# Patient Record
Sex: MALE | Race: WHITE | NOT HISPANIC OR LATINO | Employment: OTHER | URBAN - NONMETROPOLITAN AREA
[De-identification: names, ages, dates, MRNs, and addresses within clinical notes are randomized per-mention and may not be internally consistent; named-entity substitution may affect disease eponyms.]

---

## 2017-03-29 ENCOUNTER — AMBULATORY - GICH (OUTPATIENT)
Dept: FAMILY MEDICINE | Facility: OTHER | Age: 72
End: 2017-03-29

## 2017-03-29 ENCOUNTER — TRANSFERRED RECORDS (OUTPATIENT)
Dept: HEALTH INFORMATION MANAGEMENT | Facility: CLINIC | Age: 72
End: 2017-03-29

## 2017-03-29 ENCOUNTER — OFFICE VISIT - GICH (OUTPATIENT)
Dept: FAMILY MEDICINE | Facility: OTHER | Age: 72
End: 2017-03-29

## 2017-03-29 ENCOUNTER — HOSPITAL ENCOUNTER (OUTPATIENT)
Dept: RADIOLOGY | Facility: OTHER | Age: 72
End: 2017-03-29
Attending: FAMILY MEDICINE

## 2017-03-29 ENCOUNTER — HISTORY (OUTPATIENT)
Dept: FAMILY MEDICINE | Facility: OTHER | Age: 72
End: 2017-03-29

## 2017-03-29 DIAGNOSIS — R42 DIZZINESS AND GIDDINESS: ICD-10-CM

## 2017-03-29 DIAGNOSIS — D75.1 SECONDARY POLYCYTHEMIA: ICD-10-CM

## 2017-03-29 DIAGNOSIS — R26.89 OTHER ABNORMALITIES OF GAIT AND MOBILITY: ICD-10-CM

## 2017-03-29 LAB
A/G RATIO - HISTORICAL: 1.4 (ref 1–2)
ABSOLUTE BASOPHILS - HISTORICAL: 0.1 THOU/CU MM
ABSOLUTE EOSINOPHILS - HISTORICAL: 0.2 THOU/CU MM
ABSOLUTE LYMPHOCYTES - HISTORICAL: 1.4 THOU/CU MM (ref 0.9–2.9)
ABSOLUTE MONOCYTES - HISTORICAL: 0.5 THOU/CU MM
ABSOLUTE NEUTROPHILS - HISTORICAL: 3.2 THOU/CU MM (ref 1.7–7)
ALBUMIN SERPL-MCNC: 4.3 G/DL (ref 3.5–5.7)
ALP SERPL-CCNC: 89 IU/L (ref 34–104)
ALT (SGPT) - HISTORICAL: 81 IU/L (ref 7–52)
ANION GAP - HISTORICAL: 10 (ref 5–18)
AST SERPL-CCNC: 38 IU/L (ref 13–39)
BASOPHILS # BLD AUTO: 2.4 %
BILIRUB SERPL-MCNC: 0.9 MG/DL (ref 0.3–1)
BUN SERPL-MCNC: 20 MG/DL (ref 7–25)
BUN/CREAT RATIO - HISTORICAL: 27
CALCIUM SERPL-MCNC: 10 MG/DL (ref 8.6–10.3)
CHLORIDE SERPLBLD-SCNC: 103 MMOL/L (ref 98–107)
CO2 SERPL-SCNC: 26 MMOL/L (ref 21–31)
CREAT SERPL-MCNC: 0.75 MG/DL (ref 0.7–1.3)
EOSINOPHIL NFR BLD AUTO: 4.4 %
ERYTHROCYTE [DISTWIDTH] IN BLOOD BY AUTOMATED COUNT: 12.6 % (ref 11.5–15.5)
GFR IF NOT AFRICAN AMERICAN - HISTORICAL: >60 ML/MIN/1.73M2
GLOBULIN - HISTORICAL: 3 G/DL (ref 2–3.7)
GLUCOSE SERPL-MCNC: 105 MG/DL (ref 70–105)
HCT VFR BLD AUTO: 51 % (ref 37–53)
HEMOGLOBIN: 17.7 G/DL (ref 13.5–17.5)
LYMPHOCYTES NFR BLD AUTO: 25.5 % (ref 20–44)
MCH RBC QN AUTO: 32 PG (ref 26–34)
MCHC RBC AUTO-ENTMCNC: 34.6 G/DL (ref 32–36)
MCV RBC AUTO: 92 FL (ref 80–100)
MONOCYTES NFR BLD AUTO: 8.8 %
NEUTROPHILS NFR BLD AUTO: 58.9 % (ref 42–72)
PLATELET # BLD AUTO: 156 THOU/CU MM (ref 140–440)
PMV BLD: 7.4 FL (ref 6.5–11)
POTASSIUM SERPL-SCNC: 3.8 MMOL/L (ref 3.5–5.1)
PROT SERPL-MCNC: 7.3 G/DL (ref 6.4–8.9)
RED BLOOD COUNT - HISTORICAL: 5.51 MIL/CU MM (ref 4.3–5.9)
SODIUM SERPL-SCNC: 139 MMOL/L (ref 133–143)
TROPONIN I - HISTORICAL: <0.03 NG/ML
WHITE BLOOD COUNT - HISTORICAL: 5.5 THOU/CU MM (ref 4.5–11)

## 2017-03-30 ENCOUNTER — HOSPITAL ENCOUNTER (OUTPATIENT)
Dept: PHYSICAL THERAPY | Facility: OTHER | Age: 72
Setting detail: THERAPIES SERIES
End: 2017-03-30
Attending: FAMILY MEDICINE

## 2017-03-30 DIAGNOSIS — R26.89 OTHER ABNORMALITIES OF GAIT AND MOBILITY: ICD-10-CM

## 2017-03-30 DIAGNOSIS — R42 DIZZINESS AND GIDDINESS: ICD-10-CM

## 2017-04-05 ENCOUNTER — HOSPITAL ENCOUNTER (OUTPATIENT)
Dept: PHYSICAL THERAPY | Facility: OTHER | Age: 72
Setting detail: THERAPIES SERIES
End: 2017-04-05
Attending: FAMILY MEDICINE

## 2017-04-11 ENCOUNTER — AMBULATORY - GICH (OUTPATIENT)
Dept: FAMILY MEDICINE | Facility: OTHER | Age: 72
End: 2017-04-11

## 2017-07-05 ENCOUNTER — HISTORY (OUTPATIENT)
Dept: FAMILY MEDICINE | Facility: OTHER | Age: 72
End: 2017-07-05

## 2017-07-05 ENCOUNTER — OFFICE VISIT - GICH (OUTPATIENT)
Dept: FAMILY MEDICINE | Facility: OTHER | Age: 72
End: 2017-07-05

## 2017-07-05 DIAGNOSIS — M54.2 CERVICALGIA: ICD-10-CM

## 2017-07-05 DIAGNOSIS — N40.3 NODULAR PROSTATE WITH LOWER URINARY TRACT SYMPTOMS: ICD-10-CM

## 2017-07-05 DIAGNOSIS — N13.8 OTHER OBSTRUCTIVE AND REFLUX UROPATHY: ICD-10-CM

## 2017-07-06 LAB — LYME SCREEN W/REFLEX WEST BLOT - HISTORICAL: NEGATIVE

## 2017-08-03 ENCOUNTER — AMBULATORY - GICH (OUTPATIENT)
Dept: SCHEDULING | Facility: OTHER | Age: 72
End: 2017-08-03

## 2017-10-25 ENCOUNTER — AMBULATORY - GICH (OUTPATIENT)
Dept: FAMILY MEDICINE | Facility: OTHER | Age: 72
End: 2017-10-25

## 2017-10-25 DIAGNOSIS — Z23 ENCOUNTER FOR IMMUNIZATION: ICD-10-CM

## 2017-11-28 ENCOUNTER — HISTORY (OUTPATIENT)
Dept: FAMILY MEDICINE | Facility: OTHER | Age: 72
End: 2017-11-28

## 2017-11-28 ENCOUNTER — OFFICE VISIT - GICH (OUTPATIENT)
Dept: FAMILY MEDICINE | Facility: OTHER | Age: 72
End: 2017-11-28

## 2017-11-28 DIAGNOSIS — C61 MALIGNANT NEOPLASM OF PROSTATE (H): ICD-10-CM

## 2017-11-28 DIAGNOSIS — G62.9 POLYNEUROPATHY: ICD-10-CM

## 2017-11-28 DIAGNOSIS — N40.3 NODULAR PROSTATE WITH LOWER URINARY TRACT SYMPTOMS: ICD-10-CM

## 2017-11-28 DIAGNOSIS — N13.8 OTHER OBSTRUCTIVE AND REFLUX UROPATHY: ICD-10-CM

## 2017-11-28 DIAGNOSIS — E78.5 HYPERLIPIDEMIA: ICD-10-CM

## 2017-11-28 DIAGNOSIS — R63.4 ABNORMAL WEIGHT LOSS: ICD-10-CM

## 2017-11-28 DIAGNOSIS — D75.1 SECONDARY POLYCYTHEMIA: ICD-10-CM

## 2017-11-28 LAB
A/G RATIO - HISTORICAL: 1.4 (ref 1–2)
ABSOLUTE BASOPHILS - HISTORICAL: 0.1 THOU/CU MM
ABSOLUTE EOSINOPHILS - HISTORICAL: 0.2 THOU/CU MM
ABSOLUTE IMMATURE GRANULOCYTES(METAS,MYELOS,PROS) - HISTORICAL: 0 THOU/CU MM
ABSOLUTE LYMPHOCYTES - HISTORICAL: 1.5 THOU/CU MM (ref 0.9–2.9)
ABSOLUTE MONOCYTES - HISTORICAL: 0.4 THOU/CU MM
ABSOLUTE NEUTROPHILS - HISTORICAL: 3.3 THOU/CU MM (ref 1.7–7)
ALBUMIN SERPL-MCNC: 4.7 G/DL (ref 3.5–5.7)
ALP SERPL-CCNC: 82 IU/L (ref 34–104)
ALT (SGPT) - HISTORICAL: 35 IU/L (ref 7–52)
ANION GAP - HISTORICAL: 11 (ref 5–18)
AST SERPL-CCNC: 20 IU/L (ref 13–39)
BASOPHILS # BLD AUTO: 1.1 %
BILIRUB SERPL-MCNC: 1.3 MG/DL (ref 0.3–1)
BUN SERPL-MCNC: 26 MG/DL (ref 7–25)
BUN/CREAT RATIO - HISTORICAL: 32
CALCIUM SERPL-MCNC: 10.5 MG/DL (ref 8.6–10.3)
CHLORIDE SERPLBLD-SCNC: 104 MMOL/L (ref 98–107)
CHOL/HDL RATIO - HISTORICAL: 5.66
CHOLESTEROL TOTAL: 215 MG/DL
CO2 SERPL-SCNC: 23 MMOL/L (ref 21–31)
CREAT SERPL-MCNC: 0.82 MG/DL (ref 0.7–1.3)
EOSINOPHIL NFR BLD AUTO: 4.2 %
ERYTHROCYTE [DISTWIDTH] IN BLOOD BY AUTOMATED COUNT: 13 % (ref 11.5–15.5)
GFR IF NOT AFRICAN AMERICAN - HISTORICAL: >60 ML/MIN/1.73M2
GLOBULIN - HISTORICAL: 3.3 G/DL (ref 2–3.7)
GLUCOSE SERPL-MCNC: 104 MG/DL (ref 70–105)
HCT VFR BLD AUTO: 51.7 % (ref 37–53)
HDLC SERPL-MCNC: 38 MG/DL (ref 23–92)
HEMOGLOBIN: 17.8 G/DL (ref 13.5–17.5)
IMMATURE GRANULOCYTES(METAS,MYELOS,PROS) - HISTORICAL: 0.2 %
LDLC SERPL CALC-MCNC: 143 MG/DL
LYMPHOCYTES NFR BLD AUTO: 26.9 % (ref 20–44)
MCH RBC QN AUTO: 31.6 PG (ref 26–34)
MCHC RBC AUTO-ENTMCNC: 34.4 G/DL (ref 32–36)
MCV RBC AUTO: 92 FL (ref 80–100)
MONOCYTES NFR BLD AUTO: 7.9 %
NEUTROPHILS NFR BLD AUTO: 59.7 % (ref 42–72)
NON-HDL CHOLESTEROL - HISTORICAL: 177 MG/DL
PLATELET # BLD AUTO: 164 THOU/CU MM (ref 140–440)
PMV BLD: 9.8 FL (ref 6.5–11)
POTASSIUM SERPL-SCNC: 4.2 MMOL/L (ref 3.5–5.1)
PROT SERPL-MCNC: 8 G/DL (ref 6.4–8.9)
PROVIDER ORDERDED STATUS - HISTORICAL: ABNORMAL
RED BLOOD COUNT - HISTORICAL: 5.63 MIL/CU MM (ref 4.3–5.9)
SODIUM SERPL-SCNC: 138 MMOL/L (ref 133–143)
TRIGL SERPL-MCNC: 170 MG/DL
TSH - HISTORICAL: 3.03 UIU/ML (ref 0.34–5.6)
VIT B12 SERPL-MCNC: 257 PG/ML (ref 180–914)
WHITE BLOOD COUNT - HISTORICAL: 5.5 THOU/CU MM (ref 4.5–11)

## 2017-11-28 ASSESSMENT — PATIENT HEALTH QUESTIONNAIRE - PHQ9: SUM OF ALL RESPONSES TO PHQ QUESTIONS 1-9: 0

## 2017-12-27 NOTE — PROGRESS NOTES
Patient Information     Patient Name MRN Sex Heriberto Moctezuma [id number] Male 1945      Progress Notes by Kelvin Fontanez MD at 2017 10:15 AM     Author:  Kelvin Fontanez MD Service:  (none) Author Type:  Physician     Filed:  2017 10:53 AM Encounter Date:  2017 Status:  Signed     :  Kelvin Fontanez MD (Physician)            SUBJECTIVE:    Heriberto Noriega is a 71 y.o. male who presents for neck pain    HPI Comments: Patient arrives here for neck pain. States it started about one month ago when he had a cold headache body aches. The chest cold is gone. The body aches have improved he still has a little neck pain and headache. He reports his neck is very stiff. He states that his wife had similar symptoms with neck and was diagnosed with Lyme disease. He states that she was diagnosed but she never did have a positive test. He reports he also has a brain fog. Occasionally cold. He is very concerned that he might have chronic Lyme disease like his wife.      Allergies     Allergen  Reactions     Aspirin Hives   ,   Family History       Problem   Relation Age of Onset     Heart Disease  Father       from CHF       Cancer-breast  Mother       from Breast Cancer       Heart Disease  Brother      cardiac surgery, CABG       Other  Brother       polio       Cancer  Brother       from lung cancer       Genitourinary Disease  Brother       liver disease, Agent Orange being a  in the [place];     , No birth history on file.,   Social History      Substance Use Topics        Smoking status:  Former Smoker     Types: Cigarettes     Quit date: 1979     Smokeless tobacco:  Never Used     Alcohol use  No    and   Social History      Substance Use Topics        Smoking status:  Former Smoker     Types: Cigarettes     Quit date: 1979     Smokeless tobacco:  Never Used     Alcohol use  No       REVIEW OF SYSTEMS:  ROS    OBJECTIVE:  /68  Pulse 88  Wt 92.1 kg (203  lb)  BMI 31.79 kg/m2    EXAM:   Physical Exam   Constitutional: He is well-developed, well-nourished, and in no distress.   Eyes: Pupils are equal, round, and reactive to light.   Neck:   Patient has slightly decreased range of motion.  No palpable lymph nodes.       ASSESSMENT/PLAN:    ICD-10-CM    1. Neck pain M54.2 LYME SCREEN W/REFLEX      LYME SCREEN W/REFLEX   2. Nodular prostate with urinary obstruction N40.3 alfuzosin (UROXATRAL) 10 mg Sustained-Release tablet     N13.8         Plan:  Discussed extensively false positives false negatives for the Lyme titer. I discussed that a be very unusual that chronic Lyme disease would present which is neck pain. He does not have any synovitis present. Lyme titers done at his request. We'll get back to him when labs return.

## 2017-12-28 NOTE — PROGRESS NOTES
Patient Information     Patient Name MRN Heriberto Musa [id number] Male 1945      Progress Notes by Maurice Agustin MD at 2017  8:30 AM     Author:  Maurice Agustin MD Service:  (none) Author Type:  Physician     Filed:  2017  5:16 PM Encounter Date:  2017 Status:  Signed     :  Maurice Agustin MD (Physician)            Nursing Notes:   Leanne Hargrove  2017  9:18 AM  Signed  He is here today for medication management.  Leanne BARRERA Delvin LPN..................2017   9:13 AM       SUBJECTIVE:  Heriberto Noriega  is a 72 y.o. male who comes in today for complete evaluation. He saw the urologist in August and had an undetectable PSA and is doing well. He continues on alfuzosin for incontinence and it works well for him. He needs refill for that.    It was noted that he had polycythemia on the last couple CBCs. He's not had any workup for this. He is up-to-date on health maintenance issues.    He has lost about 30#. He has changed his diet and has been walking more.     He has noticed a change in strength and balance over the years. He has had some right leg pain off and on. He at times will have the feeling that his sock is bunched up on his right foot consistent with mild neuropathy.     He is headed to [place] for the winter after [place]. He has been active.    Past Medical, Family, and Social History reviewed and updated as noted below.   ROS is negative except as noted above       Allergies     Allergen  Reactions     Aspirin Hives   ,   Family History       Problem   Relation Age of Onset     Heart Disease  Father       from CHF       Cancer-breast  Mother       from Breast Cancer       Heart Disease  Brother      cardiac surgery, CABG       Other  Brother       polio       Cancer  Brother       from lung cancer       Genitourinary Disease  Brother       liver disease, Agent Orange being a  in the [place] War;     ,   Current Outpatient  Prescriptions on File Prior to Visit       Medication  Sig Dispense Refill     alfuzosin (UROXATRAL) 10 mg Sustained-Release tablet Take 1 tablet by mouth once daily with a meal. 90 tablet 3     No current facility-administered medications on file prior to visit.    ,   Past Medical History:     Diagnosis  Date     Abdominal ultrasound, abnormal 03/27/2001    cholelithiasis.        ADENOCARCINOMA, PROSTATE 11/21/2011    He completed radiation treatment for prostate cancer on August 17 of 2012.  Initially had Radioactive palladium seed implants done followed by external beam radiation      Back pain 12/13/2005     with radicular symptoms       Dietary counseling     Low carbohydrate diet.       Difficulty maintaining weight     Weight management      Elevated liver function tests     mild hyperlipidemia. Negative for hemochromatosis.  Negative Lyme disease.          H/O complete electrocardiogram 08/07/2003    with Chest XR both normal.        Hx of colonoscopy     Hyperplastic polyp at 20 cm      Hx of colonoscopy 05/16/2005    next due in 2015      Polycythemia 3/29/2017     Prostatic hypertrophy     Benign with mild urinary obstruction symptoms.      ,   Patient Active Problem List       Diagnosis  Date Noted     Polycythemia  03/29/2017     Counseling regarding advanced directives and goals of care  07/10/2015     Wants only comfort cares if not cognitively intact. Does not want to go to skilled nursing facility.        Radiation proctitis  06/22/2015     ACP (advance care planning)  12/10/2013     ADENOCARCINOMA, PROSTATE  11/21/2011     Completed radiation therapy in August of 2012 at [facility]        MEDIAL MENISCUS TEAR, RIGHT  10/04/2011     HYPERLIPIDEMIA  10/04/2011     TINNITUS  11/10/2009     OSTEOARTHRITIS  11/10/2009     SEBORRHEIC KERATOSIS  11/10/2009     HEARING LOSS, SENSORINEURAL  11/10/2009     OBESITY     ,   Past Surgical History:      Procedure  Laterality Date     COLONOSCOPY        "Hyperplastic polyp at 20 cm.         COLONOSCOPY  5/16/05    next colonoscopy due in 2015       COLONOSCOPY DIAGNOSTIC  6/22/15    F/U 2025       seed implantation  2012    for prostate ca      and   Social History      Substance Use Topics        Smoking status:  Former Smoker     Types: Cigarettes     Quit date: 1/1/1979     Smokeless tobacco:  Never Used     Alcohol use  No     OBJECTIVE:  /86  Pulse 76  Ht 1.683 m (5' 6.25\")  Wt 85.9 kg (189 lb 6.4 oz)  BMI 30.34 kg/m2   EXAM:  General Appearance: Pleasant, alert, appropriate appearance for age. No acute distress  Head Exam: Normal. Normocephalic, atraumatic.  Eye Exam:  Normal external eye, conjunctiva, lids, cornea. LAURITA. EOMI  Ear Exam: Normal TM's bilaterally. Normal auditory canals and external ears. Non-tender.  Nose Exam: Normal external nose, mucus membranes, and septum.  OroPharynx Exam:  Dental hygiene adequate. Normal buccal mucosa. Normal pharynx.  Neck Exam:  Supple, no masses or nodes. No audible bruits  Thyroid Exam: No nodules or enlargement.  Chest/Respiratory Exam: Normal chest wall and respirations. Clear to auscultation.  Cardiovascular Exam: Regular rate and rhythm. S1, S2, no murmur, click, gallop, or rubs.  Gastrointestinal Exam: Soft, non-tender, no masses or organomegaly.  Lymphatic Exam: Non-palpable nodes in neck, clavicular regions.  Musculoskeletal Exam: Back is straight and non-tender, full ROM of upper and lower extremities.  Foot Exam: Left and right foot: good pedal pulses  Skin: no rash or abnormalities  Neurologic Exam: Nonfocal, normal gross motor, tone coordination and no tremor.  Psychiatric Exam: Alert and oriented - appropriate affect.     Results for orders placed or performed in visit on 11/28/17      COMP METABOLIC PANEL      Result  Value Ref Range    SODIUM 138 133 - 143 mmol/L    POTASSIUM 4.2 3.5 - 5.1 mmol/L    CHLORIDE 104 98 - 107 mmol/L    CO2,TOTAL 23 21 - 31 mmol/L    ANION GAP 11 5 - 18              "       GLUCOSE 104 70 - 105 mg/dL    CALCIUM 10.5 (H) 8.6 - 10.3 mg/dL    BUN 26 (H) 7 - 25 mg/dL    CREATININE 0.82 0.70 - 1.30 mg/dL    BUN/CREAT RATIO           32                    GFR if African American >60 >60 ml/min/1.73m2    GFR if not African American >60 >60 ml/min/1.73m2    ALBUMIN 4.7 3.5 - 5.7 g/dL    PROTEIN,TOTAL 8.0 6.4 - 8.9 g/dL    GLOBULIN                  3.3 2.0 - 3.7 g/dL    A/G RATIO 1.4 1.0 - 2.0                    BILIRUBIN,TOTAL 1.3 (H) 0.3 - 1.0 mg/dL    ALK PHOSPHATASE 82 34 - 104 IU/L    ALT (SGPT) 35 7 - 52 IU/L    AST (SGOT) 20 13 - 39 IU/L   LIPID PANEL      Result  Value Ref Range    CHOLESTEROL,TOTAL 215 (H) <200 mg/dL    TRIGLYCERIDES 170 (H) <150 mg/dL    HDL CHOLESTEROL 38 23 - 92 mg/dL    NON-HDL CHOLESTEROL 177 (H) <145 mg/dl    CHOL/HDL RATIO            5.66 (H) <4.50                    LDL CHOLESTEROL 143 (H) <100 mg/dL    PROVIDER ORDERED STATUS RANDOM    TSH      Result  Value Ref Range    TSH 3.03 0.34 - 5.60 uIU/mL   VITAMIN B12      Result  Value Ref Range    VITAMIN B12 257 180 - 914 pg/mL   CBC WITH AUTO DIFFERENTIAL      Result  Value Ref Range    WHITE BLOOD COUNT         5.5 4.5 - 11.0 thou/cu mm    RED BLOOD COUNT           5.63 4.30 - 5.90 mil/cu mm    HEMOGLOBIN                17.8 (H) 13.5 - 17.5 g/dL    HEMATOCRIT                51.7 37.0 - 53.0 %    MCV                       92 80 - 100 fL    MCH                       31.6 26.0 - 34.0 pg    MCHC                      34.4 32.0 - 36.0 g/dL    RDW                       13.0 11.5 - 15.5 %    PLATELET COUNT            164 140 - 440 thou/cu mm    MPV                       9.8 6.5 - 11.0 fL    NEUTROPHILS               59.7 42.0 - 72.0 %    LYMPHOCYTES               26.9 20.0 - 44.0 %    MONOCYTES                 7.9 <12.0 %    EOSINOPHILS               4.2 <8.0 %    BASOPHILS                 1.1 <3.0 %    IMMATURE GRANULOCYTES(METAS,MYELOS,PROS) 0.2 %    ABSOLUTE NEUTROPHILS      3.3 1.7 - 7.0 thou/cu mm     ABSOLUTE LYMPHOCYTES      1.5 0.9 - 2.9 thou/cu mm    ABSOLUTE MONOCYTES        0.4 <0.9 thou/cu mm    ABSOLUTE EOSINOPHILS      0.2 <0.5 thou/cu mm    ABSOLUTE BASOPHILS        0.1 <0.3 thou/cu mm    ABSOLUTE IMMATURE GRANULOCYTES(METAS,MYELOS,PROS) 0.0 <=0.3 thou/cu mm      ASSESSMENT/Plan :      Heriberto was seen today for medication management.    Diagnoses and all orders for this visit:    Hyperlipidemia, unspecified hyperlipidemia type  -     COMP METABOLIC PANEL; Future  -     LIPID PANEL; Future    ADENOCARCINOMA, PROSTATE    Polycythemia  -     CBC AND DIFFERENTIAL; Future    Peripheral polyneuropathy (HC)  -     TSH; Future  -     VITAMIN B12; Future    Weight loss  -     TSH; Future      Will notify of lab results when available. Discussed diet, exercise and healthy lifestyle changes. Mr. Noriega's Body mass index is 30.34 kg/(m^2). This is out of the normal range for a 72 y.o. Normal range for ages 18+ is between 18.5 and 24.9. To lose weight we reviewed risks and benefits of appropriate options such as diet, exercise, and medications. Patient's strategy will be  self-directed nutrition plan and self-directed exercise program     Continue current medications. Will check B12 level due to mild neuropathy symptoms.     No evidence for hepatosplenomegaly so elevated hemoglobin is unlikely to be P. Vera.  Will recheck today.      Maurice Agustin MD

## 2017-12-30 NOTE — NURSING NOTE
Patient Information     Patient Name MRN Heriberto Musa [id number] Male 1945      Nursing Note by Mel Lott at 2017 10:15 AM     Author:  Mel Lott Service:  (none) Author Type:  (none)     Filed:  2017 10:13 AM Encounter Date:  2017 Status:  Signed     :  Mel Lott            Patient here for tick bite a month ago, that swelled and was raised. He started with a cold which masked any symptoms that went away. Now he has headache and body aches. Mel Lott LPN .......................2017  10:10 AM

## 2017-12-30 NOTE — NURSING NOTE
Patient Information     Patient Name MRN Heriberto Musa [id number] Male 1945      Nursing Note by Leanne Hargrove at 2017  8:30 AM     Author:  Leanne Hargrove Service:  (none) Author Type:  (none)     Filed:  2017  9:18 AM Encounter Date:  2017 Status:  Signed     :  Leanne Hargrove            He is here today for medication management.  Leanne Hargrove LPN..................2017   9:13 AM

## 2018-01-04 NOTE — NURSING NOTE
Patient Information     Patient Name MRN Heriberto Musa [id number] Male 1945      Nursing Note by Zainab Campos at 3/29/2017 11:15 AM     Author:  Zainab Campos Service:  (none) Author Type:  (none)     Filed:  3/29/2017 11:29 AM Encounter Date:  3/29/2017 Status:  Signed     :  Zainab Campos            Patient here for c/o dizzy spells that began yesterday morning when he went to get out of bed. Has had a couple spells since. Vision changes, difficulty to focus. Denies any headache. Sense of disorientation. Denies chest pain or SOB. Does recall a pain in upper left chest Friday morning but didn't last long. Took Naproxen/Benadryl two nights ago, has taken before without issue.  Had some dizziness on the way here, had to pull over. Looked down to check speedometer and back up to the road and got dizzy.  Zainab Campos LPN..............................3/29/2017  11:23 AM

## 2018-01-04 NOTE — PROGRESS NOTES
Patient Information     Patient Name MRN Sex Heriberto Moctezuma [id number] Male 1945      Progress Notes by Argenis Joes at 3/29/2017  2:50 PM     Author:  Argenis Jose Service:  (none) Author Type:  Other Clinical Staff     Filed:  3/29/2017  2:50 PM Date of Service:  3/29/2017  2:50 PM Status:  Signed     :  Argenis Jose (Other Clinical Staff)            Falls Risk Criteria:    Age 65 and older or under age 4        Sensory deficits    Poor vision    Use of ambulatory aides    Impaired judgment    Unable to walk independently    Meets High Risk criteria for falls:  Yes             1.  Do you have dizziness or vertigo?    yes                    2.  Do you need help standing or walking?   no                 3.  Have you fallen within the last 6 months?    no           4.  Has the patient been fasting?      no       If any risks are marked Yes, the following interventions are utilized:    Do not leave patient unattended     Assist patient in the dressing room and bathroom    Have ambulatory aides available throughout procedure    Involve patient s family if available

## 2018-01-04 NOTE — PROGRESS NOTES
Patient Information     Patient Name MRN Sex Heriberto Moctezuma [id number] Male 1945      Progress Notes by Lenore Akins PT at 2017 10:32 AM     Author:  Lenore Akins PT Service:  (none) Author Type:  PT- Physical Therapist     Filed:  2017 11:05 AM Date of Service:  2017 10:32 AM Status:  Signed     :  Lenore Akins PT (PT- Physical Therapist)            [facility]  Outpatient PT - Daily Note      Date of Service: 2017   Visit #2    PSFS Visit 2/10  PSFS Completed: 3-30-17    Patient Name: Heriberto Noriega   YOB: 1945   Referring MD/Provider: Dr. Vázquez  Medical and Treatment Diagnosis: balance, vertigo  PT Treatment Diagnosis: decreased mobility  Insurance: Medicare, [facility]  Start of Service: 17  Certification Dates: Start of Service: 17  Medicare/MA Re-Cert Due: 17         Subjective      Patient states he feels about 90% better.  Still unsteady at times, for example, looking up to get something out of an overhead cabinet followed by looking down at the stove.  Has not had a sense of dizziness.  Patient has chronic tinnitus and feels it has been worse since starting to have these symptoms.  Both sides equally.  Hears it over his hearing aids which used to eliminate the problem.        Objective    Treatment performed today:     Positional Testing:  [place] Hallpike Right: negative  [place] Hallpike Left: negative  Roll Test Left: negative  Roll Test Right:  negative    - Instruction for X-1 horizontal and vertical exercises.  * Added to HEP.  Patient was given a handout with picture and written instructions for exercise including guidelines for repetitions and frequency of performance.  Patient voiced understanding.  - Answered questions for patient.    Home Exercise Program:  17:  X-1         Assessment    Vestibular symptoms have improved.  No dizziness, mild unsteadiness with head motions or  supine to sit.            Short Term Goals: (2 wks)  - Eliminate dizziness during all transfers for increased stability.  Goal met 4-5-17  - Patient will demonstrate correct technique with HEP for decreased exacerbation of symptoms with mobility.    Long Term Goals: (4 wks)  - Patient will be able to return to prior level of function without limitations due to vertigo symptoms.  - Patient will score 21/24 on DGI indicating low/no risk for falls.      Completed 3-30-17:  Patient Specific Functional and Pain Scales (PSFS)  Clinician Instructions: Complete after the history and before the exam.   Initial Assessment:   We want to know what 3 activities in your life you are unable to perform, or are having the most difficulty performing, as a result of your chief problem. Please list and score at least 3 activities that you are unable to perform, or having the most difficulty performing, because of your chief problem.   Patient Specific Activity Scoring Scheme (score one number for each activity):   Activity Score (0-10)  0= Unable to perform activity  10= Able to perform activity at same level as before injury or problem   1. walking 0/10   2. transfers 0/10   3. driving 0/10   Totals:  0/10   Patient verbally states that they understand that the information they have provided above is current and complete to the best of their knowledge.   Patient Specific Functional Scale Modifier Scale Conversion: (patient's modifier that correlates with pt's score on PSFS): 0-CN (100% Impaired).  Initial Primary G Code and Modifier:    Per the Patient's intake and/or assessment the Primary G Code is: Mobility .   The Patient's Impairment, Limitation or Restriction Modifier would be best described as: CN - 100% Impairment.   Goal Primary G Code and Modifier:    The Patient's G Code Goal would be: Mobility    The Patient's Impairment, Limitation or Restriction Modifier goal would be best described as: CI - 1% - 20%  Impairment.           Rehab Prognosis:  Good      Plan    Patient to be seen 8 visits over 8 wks.  Therapy to include:  Therapeutic Exercise, Neuromuscular Re-education, Gait Training, Therapeutic Activity

## 2018-01-04 NOTE — INITIAL ASSESSMENTS
Patient Information     Patient Name MRN Sex Heriberto Moctezuma [id number] Male 1945      Initial Assessments by Lenore Akins PT at 3/30/2017  9:54 AM     Author:  Lenore Akins PT Service:  (none) Author Type:  PT- Physical Therapist     Filed:  3/30/2017 11:09 AM Date of Service:  3/30/2017  9:54 AM Status:  Signed     :  Lenore Akins PT (PT- Physical Therapist)            [facility]  Outpatient PT - Initial Evaluation  Vestibular      Date of Service: 3/30/2017   Visit #1    PSFS Visit 1/10  PSFS Completed: 3-30-17    Patient Name: Heriberto Noriega   YOB: 1945   Referring MD/Provider: Dr. Vázquez  Medical and Treatment Diagnosis: balance, vertigo  PT Treatment Diagnosis: decreased mobility  Insurance: Medicare, [facility]  Start of Service: 17  Certification Dates: Start of Service: 17  Medicare/[place] Re-Cert Due: 17     Precautions:  none  Cognition:  Oriented to Person, Place, and Time.     Were cultural / age or other special adaptations needed? No      Patient is a vulnerable adult: No      Patient is aware of diagnosis: Yes      Risks and benefits explained: Yes      Subjective      Symptoms started about 2 days ago.  Dizziness when he sat up from bed (vertical beating).  Could not initially stand. Not really dizziness during the day, but felt off.  Dizziness when rolling to prone.  Frequently had to hold onto something when trying to walk after that, but slightly better later in the day.  Today is able to walk, but cannot make fast motions (head or body).  Being very cautious with transfers.  One episode of dizziness while driving and had to pull over because he was not sure how long it would last or if it would get worse.    Vision symptoms include: None  Last eye exam was: NA.    Hearing symptoms include:  Tinnitis is stronger now than it had been previous to this episode.  Wife has noticed his hearing is  not as good as it normally is with correction.  This has been noticed for about 3 weeks or so.  Last hearing exam was: none.     Other symptoms include (pain in neck/head, recently sick):  Neck is cracking more than usual.  Had chest pain about 5-6 days ago (he associated with doing some lifting).  Went away after rest and drinking some water.  EKG and MRI yesterday which was basically negative.      Past Medical History      Diagnosis   Date     Abdominal ultrasound, abnormal  03/27/2001     cholelithiasis.        ADENOCARCINOMA, PROSTATE  11/21/2011     He completed radiation treatment for prostate cancer on August 17 of 2012.  Initially had Radioactive palladium seed implants done followed by external beam radiation      Back pain  12/13/2005      with radicular symptoms       Dietary counseling       Low carbohydrate diet.       Difficulty maintaining weight       Weight management      Elevated liver function tests       mild hyperlipidemia. Negative for hemochromatosis.  Negative Lyme disease.          H/O complete electrocardiogram  08/07/2003     with Chest XR both normal.        Hx of colonoscopy       Hyperplastic polyp at 20 cm      Hx of colonoscopy  05/16/2005     next due in 2015      Polycythemia  3/29/2017     Prostatic hypertrophy       Benign with mild urinary obstruction symptoms.        Past Surgical History       Procedure   Laterality Date     Colonoscopy         Hyperplastic polyp at 20 cm.         Colonoscopy   5/16/05     next colonoscopy due in 2015       Seed implantation   2012     for prostate ca       Colonoscopy diagnostic   6/22/15     F/U 2025         PMH (migraine, depression, anxiety, TBI, surgery, HTN, stroke, MS, seizure):  HTN reading yesterday (possibly white coat per patient)    Diagnostic Tests:  MRI, EKG, blood work (all negative)    Meds: naproxen with benedryl (aleve pm) the night before last.  Uses this couple times/month.      Previous Tx:  none    Home environment:   Lives in a house with 1 stairs to enter and 1 within home.   Patient has had 0 falls.  Patient does feel unsteady, occasionally needing to hold onto the wall or stable surface.  Assistive device:  none     Occupation: retired  Driving:  Yes, being cautious.  Prior Functional Level:  No limitations.         Completed 3-30-17:  Patient Specific Functional and Pain Scales (PSFS)  Clinician Instructions: Complete after the history and before the exam.   Initial Assessment:   We want to know what 3 activities in your life you are unable to perform, or are having the most difficulty performing, as a result of your chief problem. Please list and score at least 3 activities that you are unable to perform, or having the most difficulty performing, because of your chief problem.   Patient Specific Activity Scoring Scheme (score one number for each activity):   Activity Score (0-10)  0= Unable to perform activity  10= Able to perform activity at same level as before injury or problem   1. walking 0/10   2. transfers 0/10   3. driving 0/10   Totals:  0/10   Patient verbally states that they understand that the information they have provided above is current and complete to the best of their knowledge.   Patient Specific Functional Scale Modifier Scale Conversion: (patient's modifier that correlates with pt's score on PSFS): 0-CN (100% Impaired).  Initial Primary G Code and Modifier:    Per the Patient's intake and/or assessment the Primary G Code is: Mobility .   The Patient's Impairment, Limitation or Restriction Modifier would be best described as: CN - 100% Impairment.   Goal Primary G Code and Modifier:    The Patient's G Code Goal would be: Mobility    The Patient's Impairment, Limitation or Restriction Modifier goal would be best described as: CI - 1% - 20% Impairment.       Patients goals for therapy:  Eliminate vertigo symptoms and improve stability for return to prior functional  level.        Objective    Occulomotor Testing:    Smooth Pursuit:  WNL      End Eye ROM: WNL  Vergence:           About 8 inches  Saccades  WNL  VOR slow/fast  WNL   Head Thrust:  WNL   VOR Cx:  WNL    Spontaneous Nystagmus:  none  Gaze Evoked Nystagmus:  none  Head Shake Test: negative      Positional Testing:  [place] Right: Positive - right torsional, upbeating nystagmus, fatigues  (Did CRM to right side)  Retested Right [place]:  Negative  [place] Left: geotropic nystagmus, fatigues  Roll Test Left: geotropic nystagmus, fatigues  Roll Test Right:  Geotropic nystagmus, more intense than left  (Did BBQ Roll CRM)  Repeat roll testing - left: negative Right:  Intense geotropic nystagmus, fatigues, changed direction to upbeating/ageotropic nystagmus         Treatment performed today:   - Evaluation  - See above for CRM performed and results.   - Patient education for results of evaluation, goals, and treatment plan.  Patient voices understanding and agreement.      Home Exercise Program:  None.  Monitor symptoms only.        Assessment    Signs and symptoms consistent with: Last re-test indicates symptoms may be due to a central lesion.  Questionable right posterior canal canalithiasis.  Will re-test positional tests at next visit.  Abnormal convergence.  Balance impairment effecting mobility and safety.           Short Term Goals: (2 wks)  - Eliminate dizziness during all transfers for increased stability.  - Patient will demonstrate correct technique with HEP for decreased exacerbation of symptoms with mobility.    Long Term Goals: (4 wks)  - Patient will be able to return to prior level of function without limitations due to vertigo symptoms.  - Patient will score 21/24 on DGI indicating low/no risk for falls.    Rehab Prognosis:  Good      Plan    Patient to be seen 8 visits over 8 wks.  Therapy to include:  Therapeutic Exercise, Neuromuscular Re-education, Gait Training, Therapeutic  Activity    Thank you for your referral to [facility].  Please call with any questions, concerns or comments.  [phone]    The signature, of the referring medical provider, on this document indicates certification of the above prescribed plan of care and is medically necessary.

## 2018-01-04 NOTE — DISCHARGE SUMMARY
Patient Information     Patient Name MRN Heriberto Musa [id number] Male 1945      Discharge Summaries by Lenore Akins PT at 2017 11:37 AM     Author:  Lenore Akins PT Service:  (none) Author Type:  PT- Physical Therapist     Filed:  2017 11:39 AM Date of Service:  2017 11:37 AM Status:  Signed     :  Lenore Akins PT (PT- Physical Therapist)              [facility]  Outpatient PT - Discharge Summary    Date:  17    Dates of Service: 3-30-17    Thru:  17  Number of visits: 2   Physician:  Dr. Vázquez  Diagnosis:  Balance, vertigo            Reason for Discharge:  Patient has not been seen in 30 days.      Progress from Initial to Discharge(if applicable):  Patient was last seen on 17.  Please see that note for more information.  Voiced significant symptom improvement.  Cancelled following 2 visits.    Goals and PSFS were unable to be reassessed as discharge was unplanned.      Further Recommendations:      None        Patient is discharged from Physical Therapy

## 2018-01-04 NOTE — PROGRESS NOTES
Patient Information     Patient Name MRN Sex Heriberto Moctezuma [id number] Male 1945      Progress Notes by Arlette Vázquez MD at 3/29/2017 11:15 AM     Author:  Areltte Vázquez MD Service:  (none) Author Type:  Physician     Filed:  2017  9:56 PM Encounter Date:  3/29/2017 Status:  Signed     :  Arlette Vázquez MD (Physician)            SUBJECTIVE:    Heriberto Noriega is a 71 y.o. male who presents for evaluation of dizzy spells.  Nursing Notes:   Zainab Campos  3/29/2017 11:29 AM  Signed  Patient here for c/o dizzy spells that began yesterday morning when he went to get out of bed. Has had a couple spells since. Vision changes, difficulty to focus. Denies any headache. Sense of disorientation. Denies chest pain or SOB. Does recall a pain in upper left chest Friday morning but didn't last long. Took Naproxen/Benadryl two nights ago, has taken before without issue.  Had some dizziness on the way here, had to pull over. Looked down to check speedometer and back up to the road and got dizzy.  Zainab Campos LPN..............................3/29/2017  11:23 AM    HPI  Heriberto Noriega is a 71 y.o. male in for evaluation of dizziness.  Onset yesterday morning at 0745 upon awakening.  Nystagmus of his eyes. Slight stomach nausea.  Took alevePM the night before - takes this 1-2 times a month.  Symptoms lasted about 15 minutes, with lingering symptoms where he felt a little off.  No headache, no pain. At 0200 today he had another episode - noticed that the light from his window seemed to be moving. This morning he felt a little warm. Today with driving in, with looking from dash and back to the road, had another spell.    Symptoms maybe a little better with eyes closed.  No speaking changes.  He checked that he could smile.      Last week, , had some left sided chest pain/gas pressure.  No symptoms down his arm.  He was packing a suitcase.  Lasted about half an hour.  [place]  water to try to belch.  Has walked 2 miles a couple of times since then without symptoms.  No palpitations.      Returned from [place] last week.    History of hearing loss, chronic.  No new changes with ringing in his ears.    History of non-metastatic prostate cancer.    No FH of stroke.  His father had an MI in his late 60s.      Allergies     Allergen  Reactions     Aspirin Hives   ,   Current Outpatient Prescriptions     Medication  Sig     alfuzosin (UROXATRAL) 10 mg Sustained-Release tablet Take 1 tablet by mouth once daily with a meal.     meclizine (ANTIVERT) 12.5 mg tablet Take 1 tablet by mouth 3 times daily if needed.     No current facility-administered medications for this visit.      Medications have been reviewed by me and are current to the best of my knowledge and ability.  and   Past Medical History:     Diagnosis  Date     Abdominal ultrasound, abnormal 03/27/2001    cholelithiasis.        ADENOCARCINOMA, PROSTATE 11/21/2011    He completed radiation treatment for prostate cancer on August 17 of 2012.  Initially had Radioactive palladium seed implants done followed by external beam radiation      Back pain 12/13/2005     with radicular symptoms       Dietary counseling     Low carbohydrate diet.       Difficulty maintaining weight     Weight management      Elevated liver function tests     mild hyperlipidemia. Negative for hemochromatosis.  Negative Lyme disease.          H/O complete electrocardiogram 08/07/2003    with Chest XR both normal.        Hx of colonoscopy     Hyperplastic polyp at 20 cm      Hx of colonoscopy 05/16/2005    next due in 2015      Polycythemia 3/29/2017     Prostatic hypertrophy     Benign with mild urinary obstruction symptoms.          REVIEW OF SYSTEMS:  Review of Systems   Constitutional: Negative for chills, diaphoresis and fever.   HENT: Positive for hearing loss and tinnitus. Negative for ear pain.    Eyes: Positive for double vision. Negative for redness.    Respiratory: Negative for shortness of breath.    Musculoskeletal: Negative for falls.   Neurological: Positive for dizziness. Negative for tingling, tremors, sensory change, speech change, focal weakness, seizures, loss of consciousness and headaches.       OBJECTIVE:  /100  Pulse 72  Wt 97.5 kg (215 lb)  BMI 33.67 kg/m2    EXAM:   Physical Exam   Constitutional: He is oriented to person, place, and time and well-developed, well-nourished, and in no distress.   HENT:   Head: Normocephalic.   Hearing aids removed for exam - TMs within normal limits.  Throat clear.     Eyes: Conjunctivae are normal. Pupils are equal, round, and reactive to light.   Horizontal nystagmus - mainly with gaze to his left.   Neck:   No carotid bruits   Cardiovascular: Normal rate, regular rhythm and normal heart sounds.    No murmur heard.  Pulmonary/Chest: Effort normal and breath sounds normal. No respiratory distress.   Abdominal: Soft. Bowel sounds are normal.   Lymphadenopathy:     He has no cervical adenopathy.   Neurological: He is alert and oriented to person, place, and time. He has normal reflexes. He displays no weakness, no tremor and facial symmetry. He has an abnormal Cerebellar Exam.   Nursing note and vitals reviewed.    EKG - nsr.    Results for orders placed or performed in visit on 03/29/17      TROPONIN I      Result  Value Ref Range    TROPONIN I <0.030 <0.034 ng/mL   COMP METABOLIC PANEL      Result  Value Ref Range    SODIUM 139 133 - 143 mmol/L    POTASSIUM 3.8 3.5 - 5.1 mmol/L    CHLORIDE 103 98 - 107 mmol/L    CO2,TOTAL 26 21 - 31 mmol/L    ANION GAP 10 5 - 18                    GLUCOSE 105 70 - 105 mg/dL    CALCIUM 10.0 8.6 - 10.3 mg/dL    BUN 20 7 - 25 mg/dL    CREATININE 0.75 0.70 - 1.30 mg/dL    BUN/CREAT RATIO           27                    GFR if African American >60 >60 ml/min/1.73m2    GFR if not African American >60 >60 ml/min/1.73m2    ALBUMIN 4.3 3.5 - 5.7 g/dL    PROTEIN,TOTAL 7.3 6.4 - 8.9  g/dL    GLOBULIN                  3.0 2.0 - 3.7 g/dL    A/G RATIO 1.4 1.0 - 2.0                    BILIRUBIN,TOTAL 0.9 0.3 - 1.0 mg/dL    ALK PHOSPHATASE 89 34 - 104 IU/L    ALT (SGPT) 81 (H) 7 - 52 IU/L    AST (SGOT) 38 13 - 39 IU/L   CBC WITH AUTO DIFFERENTIAL      Result  Value Ref Range    WHITE BLOOD COUNT         5.5 4.5 - 11.0 thou/cu mm    RED BLOOD COUNT           5.51 4.30 - 5.90 mil/cu mm    HEMOGLOBIN                17.7 (H) 13.5 - 17.5 g/dL    HEMATOCRIT                51.0 37.0 - 53.0 %    MCV                       92 80 - 100 fL    MCH                       32.0 26.0 - 34.0 pg    MCHC                      34.6 32.0 - 36.0 g/dL    RDW                       12.6 11.5 - 15.5 %    PLATELET COUNT            156 140 - 440 thou/cu mm    MPV                       7.4 6.5 - 11.0 fL    NEUTROPHILS               58.9 42.0 - 72.0 %    LYMPHOCYTES               25.5 20.0 - 44.0 %    MONOCYTES                 8.8 <12.0 %    EOSINOPHILS               4.4 <8.0 %    BASOPHILS                 2.4 <3.0 %    ABSOLUTE NEUTROPHILS      3.2 1.7 - 7.0 thou/cu mm    ABSOLUTE LYMPHOCYTES      1.4 0.9 - 2.9 thou/cu mm    ABSOLUTE MONOCYTES        0.5 <0.9 thou/cu mm    ABSOLUTE EOSINOPHILS      0.2 <0.5 thou/cu mm    ABSOLUTE BASOPHILS        0.1 <0.3 thou/cu mm     Head MRI obtained - this shows SVID, no acute changes, no mass, no bleeding  ASSESSMENT/PLAN:    ICD-10-CM    1. Balance problem R26.89 EKG 12 LEAD UNIT PERFORMED      TROPONIN I      COMP METABOLIC PANEL      CBC AND DIFFERENTIAL      TROPONIN I      COMP METABOLIC PANEL      CBC AND DIFFERENTIAL      CBC WITH AUTO DIFFERENTIAL      MR HEAD BRAIN WWO      MT ELECTROCARDIOGRAM TRACING      AMB CONSULT TO PHYSICAL THERAPY      meclizine (ANTIVERT) 12.5 mg tablet   2. Vertigo R42 AMB CONSULT TO PHYSICAL THERAPY      meclizine (ANTIVERT) 12.5 mg tablet   3. Polycythemia D75.1         Plan:  1.  Reviewed with pt differential diagnosis of his dizziness, most likely is  vertigo due to labyrinthitis, also considered was ischemia/vertebral insufficiency, meniere's with his history of tinnitus and hearing loss, BPV.  MRI did not show mass, acute stroke, metastatic disease.  During the time pt was in the clinic, his symptoms did improve.  He feels comfortable returning home with close follow up with PCP.  He will be treated with meclizine prn - short course so as to prevent overuse/rebound symptoms and allow some habituation to occur.  Also referred to PT.  2.  Polycythemia noted - this has been present for more than 2 years.  Uncertain degree of work up he has had for this in the past.  Pt is uncertain too.  Reports that he has had his elevated LFTs evaluated and this was presumed to be fatty liver disease.      Follow up with Maurice Agustin MD next week.  Arlette Vázquez MD

## 2018-01-04 NOTE — PROGRESS NOTES
Patient Information     Patient Name MRN Heriberto Musa [id number] Male 1945      Progress Notes by Arlette Vázquez MD at 3/29/2017  4:04 PM     Author:  Arlette Vázquez MD Service:  (none) Author Type:  Physician     Filed:  3/29/2017  4:04 PM Date of Service:  3/29/2017  4:04 PM Status:  Signed     :  Arlette Vázquez MD (Physician)            Results given during clinic visit.  Arlette Vázquez MD ....................  3/29/2017   4:04 PM

## 2018-01-04 NOTE — PATIENT INSTRUCTIONS
Patient Information     Patient Name MRHeriberto Cummings [id number] Male 1945      Patient Instructions by Arlette Vázquez MD at 3/29/2017 11:15 AM     Author:  Arlette Vázquez MD Service:  (none) Author Type:  Physician     Filed:  3/29/2017  4:15 PM Encounter Date:  3/29/2017 Status:  Signed     :  Arlette Vázquez MD (Physician)            Polycythemia - increased hemoglobin level, increased red blood cells     Work up for this is needed - especially because you can't take aspirin.

## 2018-01-27 VITALS
DIASTOLIC BLOOD PRESSURE: 68 MMHG | WEIGHT: 203 LBS | BODY MASS INDEX: 31.79 KG/M2 | HEART RATE: 88 BPM | SYSTOLIC BLOOD PRESSURE: 124 MMHG

## 2018-01-27 VITALS
DIASTOLIC BLOOD PRESSURE: 100 MMHG | HEART RATE: 72 BPM | WEIGHT: 215 LBS | HEART RATE: 76 BPM | BODY MASS INDEX: 30.44 KG/M2 | BODY MASS INDEX: 33.67 KG/M2 | WEIGHT: 189.4 LBS | HEIGHT: 66 IN | DIASTOLIC BLOOD PRESSURE: 86 MMHG | SYSTOLIC BLOOD PRESSURE: 146 MMHG | SYSTOLIC BLOOD PRESSURE: 120 MMHG

## 2018-01-31 ASSESSMENT — PATIENT HEALTH QUESTIONNAIRE - PHQ9: SUM OF ALL RESPONSES TO PHQ QUESTIONS 1-9: 0

## 2018-02-02 PROBLEM — E66.9 OBESITY: Status: ACTIVE | Noted: 2018-02-02

## 2018-02-02 PROBLEM — D75.1 POLYCYTHEMIA: Status: ACTIVE | Noted: 2017-03-29

## 2018-05-24 ENCOUNTER — APPOINTMENT (OUTPATIENT)
Dept: GENERAL RADIOLOGY | Facility: OTHER | Age: 73
End: 2018-05-24
Attending: EMERGENCY MEDICINE
Payer: COMMERCIAL

## 2018-05-24 ENCOUNTER — HOSPITAL ENCOUNTER (OUTPATIENT)
Facility: OTHER | Age: 73
Setting detail: OBSERVATION
Discharge: HOME | End: 2018-05-25
Attending: EMERGENCY MEDICINE | Admitting: INTERNAL MEDICINE
Payer: COMMERCIAL

## 2018-05-24 ENCOUNTER — APPOINTMENT (OUTPATIENT)
Dept: GENERAL RADIOLOGY | Facility: OTHER | Age: 73
End: 2018-05-24
Attending: INTERNAL MEDICINE
Payer: COMMERCIAL

## 2018-05-24 DIAGNOSIS — R07.9 CHEST PAIN, UNSPECIFIED TYPE: ICD-10-CM

## 2018-05-24 LAB
ALBUMIN SERPL-MCNC: 4.2 G/DL (ref 3.5–5.7)
ALP SERPL-CCNC: 91 U/L (ref 34–104)
ALT SERPL W P-5'-P-CCNC: 37 U/L (ref 7–52)
ANION GAP SERPL CALCULATED.3IONS-SCNC: 8 MMOL/L (ref 3–14)
APTT PPP: 36 SEC (ref 29–50)
AST SERPL W P-5'-P-CCNC: 23 U/L (ref 13–39)
BASOPHILS # BLD AUTO: 0.1 10E9/L (ref 0–0.2)
BASOPHILS NFR BLD AUTO: 1.4 %
BILIRUB SERPL-MCNC: 0.8 MG/DL (ref 0.3–1)
BUN SERPL-MCNC: 28 MG/DL (ref 7–25)
CALCIUM SERPL-MCNC: 10 MG/DL (ref 8.6–10.3)
CHLORIDE SERPL-SCNC: 105 MMOL/L (ref 98–107)
CO2 SERPL-SCNC: 25 MMOL/L (ref 21–31)
CREAT SERPL-MCNC: 1.13 MG/DL (ref 0.7–1.3)
D DIMER PPP DDU-MCNC: <200 NG/ML D-DU (ref 0–230)
DIFFERENTIAL METHOD BLD: NORMAL
EOSINOPHIL # BLD AUTO: 0.3 10E9/L (ref 0–0.7)
EOSINOPHIL NFR BLD AUTO: 5.9 %
ERYTHROCYTE [DISTWIDTH] IN BLOOD BY AUTOMATED COUNT: 13.3 % (ref 10–15)
GFR SERPL CREATININE-BSD FRML MDRD: 64 ML/MIN/1.7M2
GLUCOSE SERPL-MCNC: 120 MG/DL (ref 70–105)
HCT VFR BLD AUTO: 47 % (ref 40–53)
HGB BLD-MCNC: 16.4 G/DL (ref 13.3–17.7)
IMM GRANULOCYTES # BLD: 0 10E9/L (ref 0–0.4)
IMM GRANULOCYTES NFR BLD: 0.5 %
INR PPP: 1.1 (ref 0–1.3)
LYMPHOCYTES # BLD AUTO: 1.7 10E9/L (ref 0.8–5.3)
LYMPHOCYTES NFR BLD AUTO: 29.5 %
MCH RBC QN AUTO: 32 PG (ref 26.5–33)
MCHC RBC AUTO-ENTMCNC: 34.9 G/DL (ref 31.5–36.5)
MCV RBC AUTO: 92 FL (ref 78–100)
MONOCYTES # BLD AUTO: 0.6 10E9/L (ref 0–1.3)
MONOCYTES NFR BLD AUTO: 9.8 %
NEUTROPHILS # BLD AUTO: 3 10E9/L (ref 1.6–8.3)
NEUTROPHILS NFR BLD AUTO: 52.9 %
NT-PROBNP SERPL-MCNC: 15 PG/ML (ref 0–100)
PLATELET # BLD AUTO: 157 10E9/L (ref 150–450)
POTASSIUM SERPL-SCNC: 4 MMOL/L (ref 3.5–5.1)
PROT SERPL-MCNC: 6.8 G/DL (ref 6.4–8.9)
RBC # BLD AUTO: 5.13 10E12/L (ref 4.4–5.9)
SODIUM SERPL-SCNC: 138 MMOL/L (ref 134–144)
TROPONIN I SERPL-MCNC: <0.03 UG/L (ref 0–0.03)
TROPONIN I SERPL-MCNC: <0.03 UG/L (ref 0–0.03)
WBC # BLD AUTO: 5.6 10E9/L (ref 4–11)

## 2018-05-24 PROCEDURE — 36415 COLL VENOUS BLD VENIPUNCTURE: CPT | Performed by: EMERGENCY MEDICINE

## 2018-05-24 PROCEDURE — 85730 THROMBOPLASTIN TIME PARTIAL: CPT | Performed by: EMERGENCY MEDICINE

## 2018-05-24 PROCEDURE — 99285 EMERGENCY DEPT VISIT HI MDM: CPT | Mod: Z6 | Performed by: EMERGENCY MEDICINE

## 2018-05-24 PROCEDURE — 99219 ZZC INITIAL OBSERVATION CARE,LEVL II: CPT | Performed by: INTERNAL MEDICINE

## 2018-05-24 PROCEDURE — 84484 ASSAY OF TROPONIN QUANT: CPT | Performed by: EMERGENCY MEDICINE

## 2018-05-24 PROCEDURE — 83880 ASSAY OF NATRIURETIC PEPTIDE: CPT | Mod: GZ | Performed by: EMERGENCY MEDICINE

## 2018-05-24 PROCEDURE — 99285 EMERGENCY DEPT VISIT HI MDM: CPT | Mod: 25 | Performed by: EMERGENCY MEDICINE

## 2018-05-24 PROCEDURE — G0378 HOSPITAL OBSERVATION PER HR: HCPCS

## 2018-05-24 PROCEDURE — 93005 ELECTROCARDIOGRAM TRACING: CPT | Performed by: EMERGENCY MEDICINE

## 2018-05-24 PROCEDURE — 85610 PROTHROMBIN TIME: CPT | Performed by: EMERGENCY MEDICINE

## 2018-05-24 PROCEDURE — 71046 X-RAY EXAM CHEST 2 VIEWS: CPT

## 2018-05-24 PROCEDURE — 36415 COLL VENOUS BLD VENIPUNCTURE: CPT | Performed by: INTERNAL MEDICINE

## 2018-05-24 PROCEDURE — 93010 ELECTROCARDIOGRAM REPORT: CPT | Performed by: INTERNAL MEDICINE

## 2018-05-24 PROCEDURE — 84484 ASSAY OF TROPONIN QUANT: CPT | Performed by: INTERNAL MEDICINE

## 2018-05-24 PROCEDURE — 71045 X-RAY EXAM CHEST 1 VIEW: CPT | Mod: XU

## 2018-05-24 PROCEDURE — 80053 COMPREHEN METABOLIC PANEL: CPT | Performed by: EMERGENCY MEDICINE

## 2018-05-24 PROCEDURE — 85379 FIBRIN DEGRADATION QUANT: CPT | Performed by: INTERNAL MEDICINE

## 2018-05-24 PROCEDURE — 85025 COMPLETE CBC W/AUTO DIFF WBC: CPT | Performed by: EMERGENCY MEDICINE

## 2018-05-24 PROCEDURE — 25000132 ZZH RX MED GY IP 250 OP 250 PS 637: Performed by: EMERGENCY MEDICINE

## 2018-05-24 RX ORDER — NALOXONE HYDROCHLORIDE 0.4 MG/ML
.1-.4 INJECTION, SOLUTION INTRAMUSCULAR; INTRAVENOUS; SUBCUTANEOUS
Status: DISCONTINUED | OUTPATIENT
Start: 2018-05-24 | End: 2018-05-25 | Stop reason: HOSPADM

## 2018-05-24 RX ORDER — NITROGLYCERIN 0.4 MG/1
0.4 TABLET SUBLINGUAL EVERY 5 MIN PRN
Status: DISCONTINUED | OUTPATIENT
Start: 2018-05-24 | End: 2018-05-25 | Stop reason: HOSPADM

## 2018-05-24 RX ORDER — ACETAMINOPHEN 325 MG/1
650 TABLET ORAL EVERY 4 HOURS PRN
Status: DISCONTINUED | OUTPATIENT
Start: 2018-05-24 | End: 2018-05-25 | Stop reason: HOSPADM

## 2018-05-24 RX ORDER — ALUMINA, MAGNESIA, AND SIMETHICONE 2400; 2400; 240 MG/30ML; MG/30ML; MG/30ML
30 SUSPENSION ORAL EVERY 4 HOURS PRN
Status: DISCONTINUED | OUTPATIENT
Start: 2018-05-24 | End: 2018-05-25 | Stop reason: HOSPADM

## 2018-05-24 RX ORDER — NITROGLYCERIN 0.4 MG/1
0.4 TABLET SUBLINGUAL EVERY 5 MIN PRN
Status: DISCONTINUED | OUTPATIENT
Start: 2018-05-24 | End: 2018-05-24

## 2018-05-24 RX ORDER — ALFUZOSIN HYDROCHLORIDE 10 MG/1
10 TABLET, EXTENDED RELEASE ORAL
Status: DISCONTINUED | OUTPATIENT
Start: 2018-05-25 | End: 2018-05-25

## 2018-05-24 RX ORDER — LIDOCAINE 40 MG/G
CREAM TOPICAL
Status: DISCONTINUED | OUTPATIENT
Start: 2018-05-24 | End: 2018-05-25 | Stop reason: HOSPADM

## 2018-05-24 RX ORDER — HYDROCODONE BITARTRATE AND ACETAMINOPHEN 5; 325 MG/1; MG/1
1-2 TABLET ORAL EVERY 4 HOURS PRN
Status: DISCONTINUED | OUTPATIENT
Start: 2018-05-24 | End: 2018-05-25 | Stop reason: HOSPADM

## 2018-05-24 RX ORDER — MORPHINE SULFATE 2 MG/ML
1 INJECTION, SOLUTION INTRAMUSCULAR; INTRAVENOUS
Status: DISCONTINUED | OUTPATIENT
Start: 2018-05-24 | End: 2018-05-25 | Stop reason: HOSPADM

## 2018-05-24 RX ADMIN — NITROGLYCERIN 0.4 MG: 0.4 TABLET SUBLINGUAL at 15:05

## 2018-05-24 ASSESSMENT — ENCOUNTER SYMPTOMS
NECK PAIN: 0
AGITATION: 0
STRIDOR: 0
RECTAL PAIN: 0
ARTHRALGIAS: 0
NUMBNESS: 0
BLOOD IN STOOL: 0
DIARRHEA: 0
WHEEZING: 0
SHORTNESS OF BREATH: 0
PALPITATIONS: 0
FATIGUE: 0
ABDOMINAL PAIN: 0
LIGHT-HEADEDNESS: 0
VOMITING: 0
DIZZINESS: 0
COUGH: 0
SPEECH DIFFICULTY: 0
CHILLS: 0
ANAL BLEEDING: 0
CHOKING: 0
CONSTIPATION: 0
ABDOMINAL DISTENTION: 0
WEAKNESS: 0
NAUSEA: 0
HEADACHES: 0
FEVER: 0

## 2018-05-24 ASSESSMENT — ACTIVITIES OF DAILY LIVING (ADL)
SWALLOWING: 0-->SWALLOWS FOODS/LIQUIDS WITHOUT DIFFICULTY
RETIRED_EATING: 0-->INDEPENDENT
FALL_HISTORY_WITHIN_LAST_SIX_MONTHS: NO
TRANSFERRING: 0-->INDEPENDENT
BATHING: 0-->INDEPENDENT
AMBULATION: 0-->INDEPENDENT
TOILETING: 0-->INDEPENDENT
DRESS: 0-->INDEPENDENT
RETIRED_COMMUNICATION: 0-->UNDERSTANDS/COMMUNICATES WITHOUT DIFFICULTY
COGNITION: 0 - NO COGNITION ISSUES REPORTED

## 2018-05-24 NOTE — ED PROVIDER NOTES
History     Chief Complaint   Patient presents with     Chest Pain     The history is provided by the patient.     71 yo male with h/o obesity, HL presents with 6 hour h/o left lower anterior chest pressure. Pain intermittent, no radiation. No SOB, diaphoresis,nausea, vomiting, fever, or chills. No prior h/o similar. No prior h/o CAD.  Pt's chest pain arrived upon arrival to the ER.  No cough, nasal congestion, rhinorrhea.  Pt was cooking in the kitchen when pain started.      Problem List:    Patient Active Problem List    Diagnosis Date Noted     Obesity 02/02/2018     Priority: Medium     Polycythemia 03/29/2017     Priority: Medium     Counseling regarding advanced directives and goals of care 07/10/2015     Priority: Medium     Overview:   Wants only comfort cares if not cognitively intact. Does not want to go to skilled nursing facility.       Radiation proctitis 06/22/2015     Priority: Medium     ACP (advance care planning) 12/10/2013     Priority: Medium     Malignant neoplasm of prostate (H) 11/21/2011     Priority: Medium     Overview:   Completed radiation therapy in August of 2012 at [facility] 10/04/2011     Priority: Medium     Tear of medial cartilage or meniscus of knee, current 10/04/2011     Priority: Medium     Hearing loss, sensorineural 11/10/2009     Priority: Medium     Osteoarthrosis 11/10/2009     Priority: Medium     Seborrheic keratosis 11/10/2009     Priority: Medium     Tinnitus 11/10/2009     Priority: Medium        Past Medical History:    Past Medical History:   Diagnosis Date     Abnormal findings on diagnostic imaging of other abdominal regions, including retroperitoneum      Dietary counseling and surveillance      Dorsalgia      Enlarged prostate without lower urinary tract symptoms (luts)      Malignant neoplasm of prostate (H)      Other specified abnormal findings of blood chemistry      Other specified postprocedural states      Other specified  postprocedural states      Other symptoms and signs concerning food and fluid intake      Personal history of other medical treatment (CODE)      Secondary polycythemia        Past Surgical History:    Past Surgical History:   Procedure Laterality Date     COLONOSCOPY       Hyperplastic polyp at 20 cm.     COLONOSCOPY      05,next colonoscopy due in      COLONOSCOPY      6/22/15,F/U      OTHER SURGICAL HISTORY      ,817144,OTHER,for prostate ca       Family History:    Family History   Problem Relation Age of Onset     HEART DISEASE Father      Heart Disease, from CHF     Breast Cancer Mother      Cancer-breast, from Breast Cancer     HEART DISEASE Brother      Heart Disease,cardiac surgery, CABG     Other - See Comments Brother       polio     CANCER Brother      Cancer, from lung cancer     Genitourinary Problems Brother      Genitourinary Disease, liver disease, Agent Orange being a  in the [place] War;       Social History:  Marital Status:   [2]  Social History   Substance Use Topics     Smoking status: Former Smoker     Types: Cigarettes     Quit date: 1979     Smokeless tobacco: Never Used     Alcohol use No        Medications:      alfuzosin (UROXATRAL) 10 MG 24 hr tablet   Naproxen Sodium (ALEVE PO)         Review of Systems   Constitutional: Negative for chills, fatigue and fever.   HENT: Negative for congestion.    Respiratory: Negative for cough, choking, shortness of breath, wheezing and stridor.    Cardiovascular: Positive for chest pain. Negative for palpitations and leg swelling.   Gastrointestinal: Negative for abdominal distention, abdominal pain, anal bleeding, blood in stool, constipation, diarrhea, nausea, rectal pain and vomiting.   Musculoskeletal: Negative for arthralgias and neck pain.   Skin: Negative for rash.   Neurological: Negative for dizziness, speech difficulty, weakness, light-headedness, numbness and headaches.   Psychiatric/Behavioral:  "Negative for agitation and behavioral problems.       Physical Exam   BP: (!) 161/106  Heart Rate: 92  Temp: 96  F (35.6  C)  Resp: 16  Height: 167.6 cm (5' 6\")  Weight: 88.5 kg (195 lb)  SpO2: 92 %      Physical Exam   Constitutional: He is oriented to person, place, and time. He appears well-developed and well-nourished.   HENT:   Head: Normocephalic and atraumatic.   Eyes: Conjunctivae and EOM are normal. Pupils are equal, round, and reactive to light.   Neck: Normal range of motion. Neck supple.   Cardiovascular: Normal rate, regular rhythm and normal heart sounds.    Pulmonary/Chest: Effort normal and breath sounds normal. No respiratory distress. He has no wheezes. He has no rales. He exhibits no tenderness.   Abdominal: Soft. Bowel sounds are normal. He exhibits no distension and no mass. There is no tenderness. There is no rebound and no guarding.   Musculoskeletal: Normal range of motion.   Neurological: He is alert and oriented to person, place, and time.   Skin: Skin is warm and dry.   Psychiatric: He has a normal mood and affect. His behavior is normal.       ED Course     ED Course     Procedures    Labs Ordered and Resulted from Time of ED Arrival Up to the Time of Departure from the ED   COMPREHENSIVE METABOLIC PANEL - Abnormal; Notable for the following:        Result Value    Glucose 120 (*)     Urea Nitrogen 28 (*)     All other components within normal limits   CBC WITH PLATELETS DIFFERENTIAL   TROPONIN I   INR   PARTIAL THROMBOPLASTIN TIME   NT PROBNP INPATIENT   PULSE OXIMETRY NURSING   CARDIAC CONTINUOUS MONITORING   PERIPHERAL IV CATHETER   PATIENT CARE ORDER                  EKG Interpretation:      Interpreted by Jose Manuel Pollard  Time reviewed: 2:53PM  Symptoms at time of EKG: chest pain  Rhythm: normal sinus   Rate: normal  Axis: normal  Ectopy: none  Conduction: normal  ST Segments/ T Waves: No ST-T wave changes  Q Waves: none  Comparison to prior: No old EKG available    Clinical " Impression: normal EKG    PROCEDURE:  XR CHEST PORT 1 VW     HISTORY: Chest pain; . .     COMPARISON:  5/25/2010     FINDINGS:     The cardiomediastinal contours are stable.  Equivocal ill-defined opacity is questioned projecting over the left  midlung. No effusion or pneumothorax is seen.         IMPRESSION:  Equivocal ill-defined opacity projecting over the left  midlung. Recommend follow-up PA and lateral views.              No results found for this or any previous visit (from the past 24 hour(s)).    Medications - No data to display     Labs Ordered and Resulted from Time of ED Arrival Up to the Time of Departure from the ED   COMPREHENSIVE METABOLIC PANEL - Abnormal; Notable for the following:        Result Value    Glucose 120 (*)     Urea Nitrogen 28 (*)     All other components within normal limits   CBC WITH PLATELETS DIFFERENTIAL   TROPONIN I   INR   PARTIAL THROMBOPLASTIN TIME   NT PROBNP INPATIENT   PULSE OXIMETRY NURSING   CARDIAC CONTINUOUS MONITORING   PERIPHERAL IV CATHETER   PATIENT CARE ORDER         Assessments & Plan (with Medical Decision Making)   1. Chest pain: unclear etiology. Pt's pain resolved upon arrival to the ER.  EKG shows NSR.  Pain has been intermittent.  Pt is moderate risk by HEART score, will admit to the medicine service for ACS rule out.      Pt has an allergy to asa, not given in the ER.      I have reviewed the nursing notes.          New Prescriptions    No medications on file       Final diagnoses:   None       5/24/2018   [facility]     Jose Manuel Pollard MD  05/24/18 4420       Jose Manuel Pollard MD  05/24/18 7566

## 2018-05-24 NOTE — IP AVS SNAPSHOT
[facility]    [address]    [place] [address]    Phone:  [phone]    Fax:  [phone]                                       After Visit Summary   5/24/2018    Heriberto Noriega    MRN: [id number]           After Visit Summary Signature Page     I have received my discharge instructions, and my questions have been answered. I have discussed any challenges I see with this plan with the nurse or doctor.    ..........................................................................................................................................  Patient/Patient Representative Signature      ..........................................................................................................................................  Patient Representative Print Name and Relationship to Patient    ..................................................               ................................................  Date                                            Time    ..........................................................................................................................................  Reviewed by Signature/Title    ...................................................              ..............................................  Date                                                            Time

## 2018-05-24 NOTE — IP AVS SNAPSHOT
MRN:[id number]                      After Visit Summary   5/24/2018    Heriberto Noriega    MRN: [id number]           Thank you!     Thank you for choosing [facility] for your care. Our goal is always to provide you with excellent care. Hearing back from our patients is one way we can continue to improve our services. Please take a few minutes to complete the written survey that you may receive in the mail after you visit with us. Thank you!        Patient Information     Date Of Birth          1945        Designated Caregiver       Most Recent Value    Caregiver    Will someone help with your care after discharge? no      About your hospital stay     You were admitted on:  May 24, 2018 You last received care in the:  [facility]    You were discharged on:  May 25, 2018        Reason for your hospital stay       Chest pain                  Who to Call     For medical emergencies, please call 911.  For non-urgent questions about your medical care, please call your primary care provider or clinic, [phone]          Attending Provider     Provider Specialty    Jose Manuel Pollard MD Emergency Medicine    [facility] Rodolfo ROGEL MD Internal Medicine       Primary Care Provider Office Phone # Fax #    Maurice BHAT MD V[facility] [phone]       When to contact your care team       Call your primary doctor if you have any of the following: temperature greater than 101,  increased shortness of breath or recurrent chest pain.                  After Care Instructions     Activity       Your activity upon discharge: activity as tolerated.  Continue with your usual activities but do not start a new and more strenuous exercise regimen until your stress test is completed            Diet       Follow this diet upon discharge: Orders Placed This Encounter      Combination Diet Regular, continue with a healthy well-balanced diet with increased fiber and less fat            Discharge  "Instructions       - there are no changes to your medications at this time                  Follow-up Appointments     Follow-up and recommended labs and tests        Follow up with Dr. Agustin as scheduled for post hospital follow up and to have a repeat chest xray                  Future tests that were ordered for you     Exercise Stress Echocardiogram       Administration of IV contrast will be tailored to this examination per the appropriate written protocol listed in the Echocardiography department Protocol Book, or by the supervising Cardiologist. This may result in an order change.    Use of contrast is at the discretion of the supervising Cardiologist.                  Pending Results     No orders found for last 3 day(s).            Statement of Approval     Ordered          05/25/18 0801  I have reviewed and agree with all the recommendations and orders detailed in this document.  EFFECTIVE NOW     Approved and electronically signed by:  Rodolfo Plaza MD             Admission Information     Date & Time Provider Department Dept. Phone    5/24/2018 Rodolfo Plaza MD [facility] [phone]      Your Vitals Were     Blood Pressure Pulse Temperature Respirations Height Weight    118/78 (BP Location: Right arm) 71 97.7  F (36.5  C) (Tympanic) 16 1.702 m (5' 7\") 92.5 kg (204 lb)    Pulse Oximetry BMI (Body Mass Index)                94% 31.95 kg/m2          MyChart Information     [facility] lets you send messages to your doctor, view your test results, renew your prescriptions, schedule appointments and more. To sign up, go to [email or web address] . Click on \"Log in\" on the left side of the screen, which will take you to the Welcome page. Then click on \"Sign up Now\" on the right side of the page.     You will be asked to enter the access code listed below, as well as some personal information. Please follow the directions to create your username and password.     Your access code is: " [email or web address]  Expires: 2018  8:05 AM     Your access code will  in 90 days. If you need help or a new code, please call your [place] clinic or [phone].        Care EveryWhere ID     This is your Care EveryWhere ID. This could be used by other organizations to access your [place] medical records  [id number]        Equal Access to Services     [facility] CenterHEIDY : Hadii aad ku hadasho Soomaali, waaxda luqadaha, qaybta kaalmada adeegyada, waxay idiin hayaan adeprachi trinidad laAlejoabida . So [facility] [phone].    ATENCIÓN: Si habla español, tiene a harry disposición servicios gratuitos de asistencia lingüística. Llame al [phone].    We comply with applicable federal civil rights laws and [place] laws. We do not discriminate on the basis of race, color, national origin, age, disability, sex, sexual orientation, or gender identity.               Review of your medicines      CONTINUE these medicines which have NOT CHANGED        Dose / Directions    ALEVE PO        Dose:  220 mg   Take 220 mg by mouth every 8 hours as needed for moderate pain   Refills:  0       alfuzosin 10 MG 24 hr tablet   Commonly known as:  UROXATRAL        Dose:  10 mg   Take 10 mg by mouth daily with food   Refills:  0                Protect others around you: Learn how to safely use, store and throw away your medicines at www.disposemymeds.org.             Medication List: This is a list of all your medications and when to take them. Check marks below indicate your daily home schedule. Keep this list as a reference.      Medications           Morning Afternoon Evening Bedtime As Needed    ALEVE PO   Take 220 mg by mouth every 8 hours as needed for moderate pain                                alfuzosin 10 MG 24 hr tablet   Commonly known as:  UROXATRAL   Take 10 mg by mouth daily with food

## 2018-05-24 NOTE — ED TRIAGE NOTES
Pt has been working the house all day and developed mid to L sided chest pain which comes and goes. Denies doing anything strenuous and says he is active for his age. It started around 1030 or 1100. The pain is also in his back. Movement does not make it worse. Rates his pain 2/10, unable to describe.

## 2018-05-25 VITALS
SYSTOLIC BLOOD PRESSURE: 129 MMHG | RESPIRATION RATE: 16 BRPM | BODY MASS INDEX: 32.02 KG/M2 | DIASTOLIC BLOOD PRESSURE: 91 MMHG | HEIGHT: 67 IN | WEIGHT: 204 LBS | TEMPERATURE: 97.5 F | HEART RATE: 73 BPM | OXYGEN SATURATION: 92 %

## 2018-05-25 DIAGNOSIS — C61 MALIGNANT NEOPLASM OF PROSTATE (H): Primary | ICD-10-CM

## 2018-05-25 LAB
CHOLEST SERPL-MCNC: 173 MG/DL
HDLC SERPL-MCNC: 28 MG/DL (ref 23–92)
LDLC SERPL CALC-MCNC: 102 MG/DL
NONHDLC SERPL-MCNC: 145 MG/DL
TRIGL SERPL-MCNC: 216 MG/DL
TROPONIN I SERPL-MCNC: <0.03 UG/L (ref 0–0.03)

## 2018-05-25 PROCEDURE — 84484 ASSAY OF TROPONIN QUANT: CPT | Performed by: INTERNAL MEDICINE

## 2018-05-25 PROCEDURE — 93005 ELECTROCARDIOGRAM TRACING: CPT

## 2018-05-25 PROCEDURE — 36415 COLL VENOUS BLD VENIPUNCTURE: CPT | Performed by: INTERNAL MEDICINE

## 2018-05-25 PROCEDURE — 80061 LIPID PANEL: CPT | Performed by: INTERNAL MEDICINE

## 2018-05-25 PROCEDURE — G0378 HOSPITAL OBSERVATION PER HR: HCPCS

## 2018-05-25 PROCEDURE — 99217 ZZC OBSERVATION CARE DISCHARGE: CPT | Performed by: INTERNAL MEDICINE

## 2018-05-25 RX ORDER — ALFUZOSIN HYDROCHLORIDE 10 MG/1
10 TABLET, EXTENDED RELEASE ORAL
Status: DISCONTINUED | OUTPATIENT
Start: 2018-05-25 | End: 2018-05-25 | Stop reason: CLARIF

## 2018-05-25 NOTE — PHARMACY - DISCHARGE MEDICATION RECONCILIATION
Pharmacy:  Discharge Medication Reconciliation    Heriberto Noriega  [address] [place] [address]  [phone] (home)   72 year old male  PCP: Maurice Agustin    Allergies: Aspirin      Discharge Medication Reconciliation:    Anabel Duran [facility] has reviewed the patient's discharge medication orders and has compared them to the inpatient medication administration record and to what the patient was taking prior to admission - any discrepancies have been resolved.    It has been determined that the patient has an adequate supply of medications available or which can be obtained from the patient's preferred pharmacy.    Thank you for the consult.    Anabel Duran RPH........May 25, 2018 8:30 AM

## 2018-05-25 NOTE — PROGRESS NOTES
"[facility]    Patient admitted to room 355 at approximately 1905 via cart from emergency room. Patient was accompanied by transport tech.     Verbal SBAR report received from Leanne prior to patient arrival.     Patient ambulated to bed independently. Patient alert and oriented X 3. The patient is not having any pain. 0-10 Pain Scale: 2. Admission vital signs: Blood pressure (!) 144/95, pulse 71, temperature 97.7  F (36.5  C), temperature source Tympanic, resp. rate 16, height 1.702 m (5' 7\"), weight 94.9 kg (209 lb 3.5 oz), SpO2 94 %. Patient was oriented to plan of care, call light, bed controls, tv, telephone and bathroom.     Risk Assessment    The following safety risks were identified during admission: none.                   Mirta Hernandez RN BSN    "

## 2018-05-25 NOTE — PLAN OF CARE
"Problem: Patient Care Overview  Goal: Plan of Care/Patient Progress Review  Outcome: No Change  Patient denies chest pain.    At 0307 patient stated he felt \"flushed\" and became diaphoretic, no changes vitally. Denies pain or palpitations.      05/25/18 0307   Vital Signs   Temp 97.7  F (36.5  C)   Temp src Tympanic   Resp 16   Heart Rate 70   Pulse/Heart Rate Source Monitor   /78   BP Location Right arm   Oxygen Therapy   SpO2 94 %   O2 Device None (Room air)     "

## 2018-05-25 NOTE — PROGRESS NOTES
" [facility] ADMISSION NOTE    Patient admitted to room 355 at approximately 1915 via wheel chair from emergency room. Patient was accompanied by other:staff.     Verbal SBAR report received from FLORENTINO Kirby prior to patient arrival.     Patient ambulated to bed independently. Patient alert and oriented X 3. The patient is not having any pain. 0-10 Pain Scale: 2. Admission vital signs: Blood pressure 107/74, pulse 71, temperature 97.4  F (36.3  C), temperature source Tympanic, resp. rate 16, height 1.702 m (5' 7\"), weight 94.9 kg (209 lb 3.5 oz), SpO2 94 %. Patient was oriented to plan of care, call light, bed controls, tv, telephone, bathroom and visiting hours.     Skin Initial Assessment    This writer admitted this patient and completed a full skin assessment and Reji score in the Adult PCS flowsheet. Appropriate interventions initiated as needed.         Skin  Inspection of bony prominences: Full  Inspection under devices: Full  Skin WDL: WDL    Reji Risk Assessment  Sensory Perception: 4-->no impairment  Moisture: 4-->rarely moist  Activity: 4-->walks frequently  Mobility: 4-->no limitation  Nutrition: 3-->adequate  Friction and Shear: 3-->no apparent problem  Reji Score: 22    Jannie Amaya    "

## 2018-05-25 NOTE — PLAN OF CARE
Problem: Pain, Acute (Adult)  Goal: Acceptable Pain Control/Comfort Level  Patient will demonstrate the desired outcomes by discharge/transition of care.   Pt states has had no chest pain or discomfort since being admitted to hospital. Instructed to return to ER if chest pain recurs.

## 2018-05-25 NOTE — H&P
[facility]    History and Physical  Hospitalist       Date of Admission:  5/24/2018    Assessment & Plan   Heriberto Noriega is a 72 year old male who presents with chest pain    Principal Problem:    Chest pain    Assessment: No exertional component, not reproducible, need to rule out for acute coronary syndrome overnight with serial troponins and repeat EKG in a.m. No evidence of ACS at this time.  Chest x-ray findings noted but no clinical evidence of pneumonia at this time possibly viral antral infiltrate and will need a repeat chest x-ray to ensure resolution as an outpatient.    Plan: -Troponin's   - telemetry   - risk of empiric aspirin outweighs benefit given allergic reaction   - EKG in a.m.   - stress test as an outpatient if negative   - repeat cxr after d/c to ensure resolution    Active Problems:    Hyperlipidemia    Assessment: not actively treated    Plan: - lipid panel in am    # Pain Assessment:  Current Pain Score 5/24/2018   Patient currently in pain? denies   Pain score (0-10) -   Heriberto pedro pain level was assessed and he currently denies pain.      DVT Prophylaxis: Low Risk/Ambulatory with no VTE prophylaxis indicated  Code Status: Full Code    Rodolfo Plaza    Primary Care Physician   Maurice Agustin    Chief Complaint   Chest pain    History is obtained from the patient and chart review.    History of Present Illness   Heriberto Noriega is a 72 year old male who presents with chest pain.  Patient is able to walk up to 5 miles without limitations by cardiopulmonary symptoms.  Today after waking up in eating breakfast he developed approximately 3 hours of intermittent waxing and waning left chest pain which was not reproducible felt like a tight or pressure sensation but not in active pain.  Due to the nonspecific symptoms he presented to the emergency department for evaluation, there underwent negative EKG and troponin was subsequently placed on observation for further  management.    Past Medical History    I have reviewed this patient's medical history and updated it with pertinent information if needed.   Past Medical History:   Diagnosis Date     Abnormal findings on diagnostic imaging of other abdominal regions, including retroperitoneum     03/27/2001,cholelithiasis.     Dietary counseling and surveillance     Low carbohydrate diet.     Dorsalgia     12/13/2005,with radicular symptoms     Enlarged prostate without lower urinary tract symptoms (luts)     Benign with mild urinary obstruction symptoms.     Malignant neoplasm of prostate (H)     11/21/2011,He completed radiation treatment for prostate cancer on August 17 of 2012.  Initially had Radioactive palladium seed implants done followed by external beam radiation     Other specified abnormal findings of blood chemistry     mild hyperlipidemia. Negative for hemochromatosis.  Negative Lyme disease.     Other specified postprocedural states     Hyperplastic polyp at 20 cm     Other specified postprocedural states     05/16/2005,next due in 2015     Other symptoms and signs concerning food and fluid intake     Weight management     Personal history of other medical treatment (CODE)     08/07/2003,with Chest XR both normal.     Secondary polycythemia     3/29/2017       Past Surgical History   I have reviewed this patient's surgical history and updated it with pertinent information if needed.  Past Surgical History:   Procedure Laterality Date     COLONOSCOPY       Hyperplastic polyp at 20 cm.     COLONOSCOPY      5/16/05,next colonoscopy due in 2015     COLONOSCOPY      6/22/15,F/U 2025     OTHER SURGICAL HISTORY      2012,600000,OTHER,for prostate ca       Prior to Admission Medications   Prior to Admission Medications   Prescriptions Last Dose Informant Patient Reported? Taking?   Naproxen Sodium (ALEVE PO) 5/24/2018 at 0900  Yes Yes   Sig: Take 220 mg by mouth every 8 hours as needed for moderate pain   alfuzosin  (UROXATRAL) 10 MG 24 hr tablet 2018 at 1200  Yes Yes   Sig: Take 10 mg by mouth daily with food      Facility-Administered Medications: None     Allergies   Allergies   Allergen Reactions     Aspirin Hives       Social History   I have reviewed this patient's social history and updated it with pertinent information if needed. Heriberto Noriega  reports that he quit smoking about 39 years ago. His smoking use included Cigarettes. He has never used smokeless tobacco. He reports that he does not drink alcohol.    Family History   I have reviewed this patient's family history and updated it with pertinent information if needed.   Family History   Problem Relation Age of Onset     HEART DISEASE Father      Heart Disease, from CHF     Breast Cancer Mother      Cancer-breast, from Breast Cancer     HEART DISEASE Brother      Heart Disease,cardiac surgery, CABG     Other - See Comments Brother       polio     CANCER Brother      Cancer, from lung cancer     Genitourinary Problems Brother      Genitourinary Disease, liver disease, Agent Orange being a  in the [place] War;       Review of Systems   The 10 point Review of Systems is negative other than noted in the HPI or here.     Physical Exam   Temp: 97.7  F (36.5  C) Temp src: Tympanic BP: 138/87 Pulse: 71 Heart Rate: 74 Resp: 16 SpO2: 95 % O2 Device: None (Room air) Oxygen Delivery: 2 LPM  Vital Signs with Ranges  Temp:  [96  F (35.6  C)-97.7  F (36.5  C)] 97.7  F (36.5  C)  Pulse:  [71-82] 71  Heart Rate:  [67-93] 74  Resp:  [10-23] 16  BP: (107-161)/() 138/87  SpO2:  [90 %-97 %] 95 %  209 lbs 3.46 oz    Exam:  GENERAL: Talkative, in no apparent distress.  Head: normocephalic and atraumatic  Eyes: anicteric and non-injected sclera  Nose: no rhinorrhea or epistaxis.   Throat: moist mucous membranes with no active oral lesions.  NECK: Supple, jugular venous distension not present.  CARDIOVASCULAR: regular rate and rhythm, no murmurs, rubs, or  gallops. Normal S1/S2. No lower extremity edema.   RESPIRATORY: clear to auscultation bilaterally, no wheezes, no crackles.  No accessory muscle use or evidence of respiratory distress.   GI: central obesity, soft, non-tender, non-distended, normoactive bowel sounds.  MUSCULOSKELETAL: warm and well perfused, 2+ dorsalis pedis pulses.    SKIN: no pallor, jaundice or rashes.  NEUROLOGY: AAOx3, follows commands, speech and language without focal deficits.        Data   Data reviewed today:  I personally reviewed the EKG tracing showing Sinus rhythm, normal axis, no other ST-T wave inversions elevations or depressions and the chest x-ray image(s) showing No acute infiltrate.    Recent Labs  Lab 05/24/18  1508   WBC 5.6   HGB 16.4   MCV 92      INR 1.10      POTASSIUM 4.0   CHLORIDE 105   CO2 25   BUN 28*   CR 1.13   ANIONGAP 8   JOSE 10.0   *   ALBUMIN 4.2   PROTTOTAL 6.8   BILITOTAL 0.8   ALKPHOS 91   ALT 37   AST 23   TROPI <0.030       Recent Results (from the past 24 hour(s))   XR Chest Port 1 View    Narrative    PROCEDURE:  XR CHEST PORT 1 VW    HISTORY: Chest pain; . .    COMPARISON:  5/25/2010    FINDINGS:    The cardiomediastinal contours are stable.  Equivocal ill-defined opacity is questioned projecting over the left  midlung. No effusion or pneumothorax is seen.      Impression    IMPRESSION:  Equivocal ill-defined opacity projecting over the left  midlung. Recommend follow-up PA and lateral views.      KATLIN HERNANDEZ MD   XR Chest 2 Views    Narrative    PROCEDURE:  XR CHEST 2 VW    HISTORY:  evaluate portable cxr abnormality; .     COMPARISON:  May 24, 2018    FINDINGS:   The cardiac silhouette is normal in size. The pulmonary vasculature is  normal.  There is a small area of increased density in the left lung  suspicious for pneumonia. The right lung appears clear. No pleural  effusion or pneumothorax.      Impression    IMPRESSION:  Left lung opacity confirmed      MARGARITO  MD GARRET

## 2018-05-25 NOTE — PLAN OF CARE
Problem: Patient Care Overview  Goal: Plan of Care/Patient Progress Review  Outcome: Declining  Patient has denied chest pain since admission to [facility].

## 2018-05-25 NOTE — PROGRESS NOTES
NS DISCHARGE NOTE    Patient discharged to home at 0820 AM via ambulation. Accompanied by staff. Has his own car here.Discharge instructions reviewed with patient, opportunity offered to ask questions. Prescriptions - None ordered for discharge. All belongings sent with patient. Instructed to return to ER with any recurring chest discomfort or pain.    Darryn Kurtz

## 2018-05-25 NOTE — DISCHARGE SUMMARY
[facility]    Discharge Summary  Hospitalist    Date of Admission:  5/24/2018  Date of Discharge:  5/25/2018  Discharging Provider: Rodolfo Plaza  Date of Service (when I saw the patient): 05/25/18    Discharge Diagnoses   Principal Problem:    Chest pain (5/24/2018)  Active Problems:    Hyperlipidemia (10/4/2011)      History of Present Illness   Heriberto Noriega is an 72 year old male who presented with chest pain.  Patient is able to walk up to 5 miles without limitations by cardiopulmonary symptoms.  Today after waking up in eating breakfast he developed approximately 3 hours of intermittent waxing and waning left chest pain which was not reproducible felt like a tight or pressure sensation but not in active pain.  Due to the nonspecific symptoms he presented to the emergency department for evaluation, there underwent negative EKG and troponin was subsequently placed on observation for further management.     Hospital Course   Heriberto Noriega was admitted on 5/24/2018.  The following problems were addressed during his hospitalization:    Chest pain: No exertional component, not reproducible, right patient was successfully ruled out for acute coronary syndrome with negative troponins and no EKG changes consistent with ischemia.  Chest x-ray findings were noted but no clinical evidence of pneumonia at this time, and possibly a viral infiltrate.  D-dimer was also negative.  He is agreeable to exercise echo stress test for more definitive risk stratification as an outpatient and this was ordered.  He will need a repeat chest x-ray at time of follow up with pcp to ensure resolution of findings as well.       Hyperlipidemia: LDL of 102 and after discussion reguarding risks and benefits of statin initiation as well as risk model with Smock cardiac risk he wants to focus on lifestyle modification over the short-term with consideration for repeat lipid panel and if not having any significant change  after losing 10-15 pounds and improving his diet he would then consider starting a statin.    # Discharge Pain Plan:   - Patient currently has NO PAIN and is not being prescribed pain medications on discharge.    Rodolfo Plaza MD    Significant Results and Procedures   none    Pending Results   These results will be followed up by pcp  Unresulted Labs Ordered in the Past 30 Days of this Admission     No orders found for last 61 day(s).          Code Status   Full Code       Primary Care Physician   Maurice Agustin    Physical Exam   Temp: 97.7  F (36.5  C) Temp src: Tympanic BP: 118/78 Pulse: 71 Heart Rate: 69 Resp: 16 SpO2: 94 % O2 Device: None (Room air) Oxygen Delivery: 2 LPM  Vitals:    05/24/18 1447 05/24/18 1902 05/25/18 0649   Weight: 88.5 kg (195 lb) 94.9 kg (209 lb 3.5 oz) 92.5 kg (204 lb)     Vital Signs with Ranges  Temp:  [96  F (35.6  C)-97.7  F (36.5  C)] 97.7  F (36.5  C)  Pulse:  [71-82] 71  Heart Rate:  [67-93] 69  Resp:  [10-23] 16  BP: (107-161)/() 118/78  SpO2:  [90 %-97 %] 94 %       Exam:   GENERAL: Talkative, in no apparent distress.  CARDIOVASCULAR: regular rate and rhythm, no murmurs, rubs, or gallops. Normal S1/S2. No lower extremity edema.   RESPIRATORY: clear to auscultation bilaterally, no wheezes, no crackles.   GI: soft, non-tender, non-distended, normoactive bowel sounds.  MUSCULOSKELETAL: warm and well perfused, 2+ dorsalis pedis pulses bilaterally.    SKIN: no pallor,jaundice, or rashes    Discharge Disposition   Discharged to home  Condition at discharge: Stable    Consultations This Hospital Stay   None    Time Spent on this Encounter   IRodolfo, personally saw the patient today and spent less than or equal to 30 minutes discharging this patient.    Discharge Orders     Reason for your hospital stay   Chest pain     Follow-up and recommended labs and tests    Follow up with Dr. Agustin as scheduled for post hospital follow up and to have a repeat chest xray     Activity    Your activity upon discharge: activity as tolerated.  Continue with your usual activities but do not start a new and more strenuous exercise regimen until your stress test is completed     When to contact your care team   Call your primary doctor if you have any of the following: temperature greater than 101,  increased shortness of breath or recurrent chest pain.     Discharge Instructions   - there are no changes to your medications at this time     Full Code     Exercise Stress Echocardiogram   Administration of IV contrast will be tailored to this examination per the appropriate written protocol listed in the Echocardiography department Protocol Book, or by the supervising Cardiologist. This may result in an order change.    Use of contrast is at the discretion of the supervising Cardiologist.     Diet   Follow this diet upon discharge: Orders Placed This Encounter     Combination Diet Regular, continue with a healthy well-balanced diet with increased fiber and less fat       Discharge Medications   Current Discharge Medication List      CONTINUE these medications which have NOT CHANGED    Details   alfuzosin (UROXATRAL) 10 MG 24 hr tablet Take 10 mg by mouth daily with food      Naproxen Sodium (ALEVE PO) Take 220 mg by mouth every 8 hours as needed for moderate pain           Allergies   Allergies   Allergen Reactions     Aspirin Hives     Data   Most Recent 3 CBC's:  Recent Labs   Lab Test  05/24/18   1508  11/28/17   1037  03/29/17   1227   WBC  5.6   --    --    HGB  16.4  17.8*  17.7*   MCV  92  92  92   PLT  157  164  156      Most Recent 3 BMP's:  Recent Labs   Lab Test  05/24/18   1508  11/28/17   1046  03/29/17   1247   NA  138  138  139   POTASSIUM  4.0  4.2  3.8   CHLORIDE  105  104  103   CO2  25  23  26   BUN  28*  26*  20   CR  1.13  0.82  0.75   ANIONGAP  8   --    --    JOSE  10.0  10.5*  10.0   GLC  120*  104  105     Most Recent 2 LFT's:  Recent Labs   Lab Test  05/24/18   1508  11/28/17    1046   AST  23   --    ALT  37   --    ALKPHOS  91  82   BILITOTAL  0.8  1.3*     Most Recent INR's and Anticoagulation Dosing History:  Anticoagulation Dose History     Recent Dosing and Labs Latest Ref Rng & Units 5/24/2018    INR 0 - 1.3 1.10        Most Recent 3 Troponin's:  Recent Labs   Lab Test  05/25/18   0416  05/24/18   2027  05/24/18   1508   TROPI  <0.030  <0.030  <0.030     Most Recent Cholesterol Panel:  Recent Labs   Lab Test  05/25/18 0416   CHOL  173   LDL  102*   HDL  28   TRIG  216*     Most Recent 6 Bacteria Isolates From Any Culture (See EPIC Reports for Culture Details):No lab results found.  Most Recent TSH, T4 and A1c Labs:No lab results found.  Results for orders placed or performed during the hospital encounter of 05/24/18   XR Chest Port 1 View    Narrative    PROCEDURE:  XR CHEST PORT 1 VW    HISTORY: Chest pain; . .    COMPARISON:  5/25/2010    FINDINGS:    The cardiomediastinal contours are stable.  Equivocal ill-defined opacity is questioned projecting over the left  midlung. No effusion or pneumothorax is seen.      Impression    IMPRESSION:  Equivocal ill-defined opacity projecting over the left  midlung. Recommend follow-up PA and lateral views.      KATLIN HERNANDEZ MD   XR Chest 2 Views    Narrative    PROCEDURE:  XR CHEST 2 VW    HISTORY:  evaluate portable cxr abnormality; .     COMPARISON:  May 24, 2018    FINDINGS:   The cardiac silhouette is normal in size. The pulmonary vasculature is  normal.  There is a small area of increased density in the left lung  suspicious for pneumonia. The right lung appears clear. No pleural  effusion or pneumothorax.      Impression    IMPRESSION:  Left lung opacity confirmed      MARGARITO COMBS MD

## 2018-05-30 ENCOUNTER — OFFICE VISIT (OUTPATIENT)
Dept: FAMILY MEDICINE | Facility: OTHER | Age: 73
End: 2018-05-30
Attending: FAMILY MEDICINE
Payer: COMMERCIAL

## 2018-05-30 VITALS
DIASTOLIC BLOOD PRESSURE: 84 MMHG | WEIGHT: 204.6 LBS | BODY MASS INDEX: 32.04 KG/M2 | HEART RATE: 60 BPM | SYSTOLIC BLOOD PRESSURE: 134 MMHG

## 2018-05-30 DIAGNOSIS — R07.9 CHEST PAIN, UNSPECIFIED TYPE: Primary | ICD-10-CM

## 2018-05-30 DIAGNOSIS — Z13.6 SCREENING FOR CARDIOVASCULAR CONDITION: ICD-10-CM

## 2018-05-30 PROCEDURE — G0463 HOSPITAL OUTPT CLINIC VISIT: HCPCS

## 2018-05-30 PROCEDURE — 99213 OFFICE O/P EST LOW 20 MIN: CPT | Performed by: FAMILY MEDICINE

## 2018-05-30 ASSESSMENT — PAIN SCALES - GENERAL: PAINLEVEL: NO PAIN (0)

## 2018-05-30 NOTE — MR AVS SNAPSHOT
"              After Visit Summary   5/30/2018    Heriberto Noriega    MRN: [id number]           Patient Information     Date Of Birth          1945        Visit Information        Provider Department      5/30/2018 10:15 AM Maurice Agustin MD [facility]        Today's Diagnoses     Chest pain, unspecified type    -  1    Screening for cardiovascular condition           Follow-ups after your visit        Future tests that were ordered for you today     Open Future Orders        Priority Expected Expires Ordered    CT Coronary Calcium Scan Routine  5/30/2019 5/30/2018            Who to contact     If you have questions or need follow up information about today's clinic visit or your schedule please contact [facility] directly at [phone].  Normal or non-critical lab and imaging results will be communicated to you by TowerJazzhart, letter or phone within 4 business days after the clinic has received the results. If you do not hear from us within 7 days, please contact the clinic through TowerJazzhart or phone. If you have a critical or abnormal lab result, we will notify you by phone as soon as possible.  Submit refill requests through PodPoster or call your pharmacy and they will forward the refill request to us. Please allow 3 business days for your refill to be completed.          Additional Information About Your Visit        MyChart Information     PodPoster lets you send messages to your doctor, view your test results, renew your prescriptions, schedule appointments and more. To sign up, go to [email or web address] . Click on \"Log in\" on the left side of the screen, which will take you to the Welcome page. Then click on \"Sign up Now\" on the right side of the page.     You will be asked to enter the access code listed below, as well as some personal information. Please follow the directions to create your username and password.     Your access code is: [email or web address]  Expires: " 2018  8:05 AM     Your access code will  in 90 days. If you need help or a new code, please call your [facility] clinic or [phone].        Care EveryWhere ID     This is your Care EveryWhere ID. This could be used by other organizations to access your [facility] medical records  [id number]        Your Vitals Were     Pulse BMI (Body Mass Index)                60 32.04 kg/m2           Blood Pressure from Last 3 Encounters:   18 134/84   18 (!) 129/91   17 120/86    Weight from Last 3 Encounters:   18 204 lb 9.6 oz (92.8 kg)   18 204 lb (92.5 kg)   17 189 lb 6.4 oz (85.9 kg)               Primary Care Provider Office Phone # Fax #    Maurice BHAT MD Vamsi [phone] [address] [facility] [address]        Equal Access to Services     [facility] RosaHEIDY : Hadii aad ku hadasho Soomaali, waaxda luqadaha, qaybta kaalmada adeegyada, waxay idiin haygarlandn noris madden . So [facility] [phone].    ATENCIÓN: Si habla español, tiene a harry disposición servicios gratuitos de asistencia lingüística. Llame al [phone].    We comply with applicable federal civil rights laws and [place] laws. We do not discriminate on the basis of race, color, national origin, age, disability, sex, sexual orientation, or gender identity.            Thank you!     Thank you for choosing [facility] AND \[facility] [place] [facility]\""  for your care. Our goal is always to provide you with excellent care. Hearing back from our patients is one way we can continue to improve our services. Please take a few minutes to complete the written survey that you may receive in the mail after your visit with us. Thank you!             Your Updated Medication List - Protect others around you: Learn how to safely use, store and throw away your medicines at www.disposemymeds.org.          This list is accurate as of 18  1:18 PM.  Always use your most recent med list.                   Brand  Name Dispense Instructions for use Diagnosis    alfuzosin 10 MG 24 hr tablet    UROXATRAL     Take 10 mg by mouth daily with food

## 2018-05-30 NOTE — NURSING NOTE
He is here today for a hospital follow up.  Leanne Hargrove LPN..................5/30/2018   10:26 AM

## 2018-05-30 NOTE — PROGRESS NOTES
"Nursing Notes:   Leanne Hargrove LPN  5/30/2018 10:44 AM  Signed  He is here today for a hospital follow up.  Leanne Hargrove LPN..................5/30/2018   10:26 AM    SUBJECTIVE:  Heriberto Noriega  is a 72 year old male who comes in for followup of recent hospitalization. The brief hospital course is as follows:  \" Principal Problem:    Chest pain (5/24/2018)  Active Problems:    Hyperlipidemia (10/4/2011)   History of Present Illness  Heriberto Noriega is an 72 year old male who presented with chest pain.  Patient is able to walk up to 5 miles without limitations by cardiopulmonary symptoms.  Today after waking up in eating breakfast he developed approximately 3 hours of intermittent waxing and waning left chest pain which was not reproducible felt like a tight or pressure sensation but not in active pain.  Due to the nonspecific symptoms he presented to the emergency department for evaluation, there underwent negative EKG and troponin was subsequently placed on observation for further management.  Hospital Course  Heriberto Noriega was admitted on 5/24/2018.  The following problems were addressed during his hospitalization:     Chest pain: No exertional component, not reproducible, right patient was successfully ruled out for acute coronary syndrome with negative troponins and no EKG changes consistent with ischemia.  Chest x-ray findings were noted but no clinical evidence of pneumonia at this time, and possibly a viral infiltrate.  D-dimer was also negative.  He is agreeable to exercise echo stress test for more definitive risk stratification as an outpatient and this was ordered.  He will need a repeat chest x-ray at time of follow up with pcp to ensure resolution of findings as well.        Hyperlipidemia: LDL of 102 and after discussion reguarding risks and benefits of statin initiation as well as risk model with [place] cardiac risk he wants to focus on lifestyle modification over the short-term with " "consideration for repeat lipid panel and if not having any significant change after losing 10-15 pounds and improving his diet he would then consider starting a statin.\"    They called to schedule his stress echo but he wanted to see me first to discuss this prior to proceeding.  He has had no further symptoms and is continued to be active and exercise.    Past Medical, Family, and Social History reviewed and updated as noted below.   ROS is negative except as noted above       Allergies   Allergen Reactions     Aspirin Hives   ,   Family History   Problem Relation Age of Onset     HEART DISEASE Father      Heart Disease, from CHF     Breast Cancer Mother      Cancer-breast, from Breast Cancer     HEART DISEASE Brother      Heart Disease,cardiac surgery, CABG     Other - See Comments Brother       polio     CANCER Brother      Cancer, from lung cancer     Genitourinary Problems Brother      Genitourinary Disease, liver disease, Agent Orange being a  in the [place];   ,   Current Outpatient Prescriptions   Medication     alfuzosin (UROXATRAL) 10 MG 24 hr tablet     No current facility-administered medications for this visit.    ,   Past Medical History:   Diagnosis Date     Abnormal findings on diagnostic imaging of other abdominal regions, including retroperitoneum     2001,cholelithiasis.     Dietary counseling and surveillance     Low carbohydrate diet.     Dorsalgia     2005,with radicular symptoms     Enlarged prostate without lower urinary tract symptoms (luts)     Benign with mild urinary obstruction symptoms.     Malignant neoplasm of prostate (H)     2011,He completed radiation treatment for prostate cancer on .  Initially had Radioactive palladium seed implants done followed by external beam radiation     Other specified abnormal findings of blood chemistry     mild hyperlipidemia. Negative for hemochromatosis.  Negative Lyme disease.     Other " specified postprocedural states     Hyperplastic polyp at 20 cm     Other specified postprocedural states     05/16/2005,next due in 2015     Other symptoms and signs concerning food and fluid intake     Weight management     Personal history of other medical treatment (CODE)     08/07/2003,with Chest XR both normal.     Secondary polycythemia     3/29/2017   ,   Patient Active Problem List    Diagnosis Date Noted     Chest pain 05/24/2018     Priority: Medium     Obesity 02/02/2018     Priority: Medium     Polycythemia 03/29/2017     Priority: Medium     Counseling regarding advanced directives and goals of care 07/10/2015     Priority: Medium     Overview:   Wants only comfort cares if not cognitively intact. Does not want to go to skilled nursing facility.       ACP (advance care planning) 12/10/2013     Priority: Medium     Malignant neoplasm of prostate (H) 11/21/2011     Priority: Medium     Overview:   Completed radiation therapy in August of 2012 at [facility]       Hyperlipidemia 10/04/2011     Priority: Medium     Hearing loss, sensorineural 11/10/2009     Priority: Medium     Osteoarthrosis 11/10/2009     Priority: Medium     Seborrheic keratosis 11/10/2009     Priority: Medium     Tinnitus 11/10/2009     Priority: Medium   ,   Past Surgical History:   Procedure Laterality Date     COLONOSCOPY       Hyperplastic polyp at 20 cm.     COLONOSCOPY      5/16/05,next colonoscopy due in 2015     COLONOSCOPY      6/22/15,F/U 2025     OTHER SURGICAL HISTORY      2012,600000,OTHER,for prostate ca    and   Social History   Substance Use Topics     Smoking status: Former Smoker     Types: Cigarettes     Quit date: 1/1/1979     Smokeless tobacco: Never Used     Alcohol use No     OBJECTIVE:  /84 (BP Location: Right arm, Patient Position: Sitting, Cuff Size: Adult Regular)  Pulse 60  Wt 204 lb 9.6 oz (92.8 kg)  BMI 32.04 kg/m2   EXAM:  Alert and cooperative, no distress.  Lungs are clear, no rales  rhonchi or wheezes are heard.  Cardiac RRR without murmur  ASSESSMENT/Plan :    Heriberto was seen today for hospital f/u.    Diagnoses and all orders for this visit:    Chest pain, unspecified type  -     CT Coronary Calcium Scan; Future    Screening for cardiovascular condition  -     CT Coronary Calcium Scan; Future      Discussion with regard to his previous episode which is likely noncardiac.  Could be musculoskeletal or perhaps associated with some esophageal spasm.  It is not recurred despite his physical activity.  Discussed stress test but after discussion elected to move ahead with a CT cardiac calcium score and base next steps on findings there in.  Will notify of those results when available.    A total of 15 minutes was spent with the patient, greater than 50% of the time was spent in counseling/discussion of the aforementioned concerns.     Maurice Agustin MD

## 2018-05-31 RX ORDER — ALFUZOSIN HYDROCHLORIDE 10 MG/1
10 TABLET, EXTENDED RELEASE ORAL
Qty: 90 TABLET | Refills: 3 | Status: SHIPPED | OUTPATIENT
Start: 2018-05-31 | End: 2018-11-28

## 2018-05-31 NOTE — TELEPHONE ENCOUNTER
Prescription approved per [facility] Refill Protocol.  BP in normal range at last office visit on 5-30-18. Angelina Nguyen RN on 5/31/2018 at 2:33 PM

## 2018-06-01 ENCOUNTER — MYC MEDICAL ADVICE (OUTPATIENT)
Dept: FAMILY MEDICINE | Facility: OTHER | Age: 73
End: 2018-06-01

## 2018-06-01 ENCOUNTER — HOSPITAL ENCOUNTER (OUTPATIENT)
Dept: CT IMAGING | Facility: OTHER | Age: 73
Discharge: HOME | End: 2018-06-01
Attending: FAMILY MEDICINE | Admitting: FAMILY MEDICINE
Payer: COMMERCIAL

## 2018-06-01 DIAGNOSIS — R07.9 CHEST PAIN, UNSPECIFIED TYPE: ICD-10-CM

## 2018-06-01 DIAGNOSIS — Z13.6 SCREENING FOR CARDIOVASCULAR CONDITION: ICD-10-CM

## 2018-06-01 PROCEDURE — 75571 CT HRT W/O DYE W/CA TEST: CPT

## 2018-06-07 ENCOUNTER — OFFICE VISIT (OUTPATIENT)
Dept: FAMILY MEDICINE | Facility: OTHER | Age: 73
End: 2018-06-07
Attending: FAMILY MEDICINE
Payer: COMMERCIAL

## 2018-06-07 ENCOUNTER — MYC MEDICAL ADVICE (OUTPATIENT)
Dept: FAMILY MEDICINE | Facility: OTHER | Age: 73
End: 2018-06-07

## 2018-06-07 VITALS — SYSTOLIC BLOOD PRESSURE: 134 MMHG | HEART RATE: 68 BPM | DIASTOLIC BLOOD PRESSURE: 86 MMHG

## 2018-06-07 DIAGNOSIS — I77.89 ENLARGED THORACIC AORTA (H): ICD-10-CM

## 2018-06-07 DIAGNOSIS — I25.10 CORONARY ARTERY CALCIFICATION SEEN ON CT SCAN: Primary | ICD-10-CM

## 2018-06-07 PROCEDURE — G0463 HOSPITAL OUTPT CLINIC VISIT: HCPCS

## 2018-06-07 PROCEDURE — 99213 OFFICE O/P EST LOW 20 MIN: CPT | Performed by: FAMILY MEDICINE

## 2018-06-07 ASSESSMENT — PAIN SCALES - GENERAL: PAINLEVEL: NO PAIN (0)

## 2018-06-07 NOTE — PROGRESS NOTES
Nursing Notes:   Leanne Hargrove LPN  2018  1:41 PM  Signed  He is here today to follow up after having a CT calcium score test.  Leanne Hargrove LPN..................2018   1:35 PM      SUBJECTIVE:  Heriberto Noriega  is a 72 year old male who comes in today for follow-up after CT cardiac calcium score.  I saw him last week after given briefly hospitalized for chest pain and MI was ruled out.  He was scheduled for an outpatient stress echo but wanted to put that on hold as he was asymptomatic.  We compromised on doing a CT cardiac calcium score and it was markedly elevated at 1504.  He also had a dilated thoracic aorta measuring 3.8 cm in the mid descending portion.  He has not had any symptoms since his last visit.      CT CALCIUM SCREENING     HISTORY: ; Chest pain, unspecified type; Screening for cardiovascular  condition     TECHNIQUE: A ECG synchronized prospectively triggered noncontrast CT  of the heart was performed. Coronary calcification was analyzed using  Siemens HeartView CT calcium scoring software.      COMPARISON:  18     FINDINGS:     CORONARY CALCIUM SCORE:     Left Main:                            83.3  Left anterior descendin.6  Left Circumflex:                  25.3  Right coronary artery:        1022.7     TOTAL:                               1504.0     The estimated probability of a non-zero calcium score for a   Male of age 72 years is 87%. The observed calcium score is at 88  percentile for subjects of the same age, gender, and race/ethnicity  who are free of clinical cardiovascular disease and treated diabetes.     CARDIAC STRUCTURE AND FUNCTION:      The ascending thoracic aorta is dilated up to 3.8 cm.     OTHER CHEST FINDINGS:       Patchy mild pulmonary scarring is seen. There are variable  costochondral calcifications, which may have accounted for the  appearance of the prior study. No suspicious solid mass is identified.         IMPRESSION:       Total  calcium score of 1504. The observed calcium score is at the 88th  percentile for subjects of the same age, gender, and race/ethnicity  who are free of clinical cardiovascular disease and treated diabetes.     Dilated thoracic aorta measuring 3.8 cm in the mid ascending portion.     KATLIN HERNANDEZ MD    Past Medical, Family, and Social History reviewed and updated as noted below.   ROS is negative except as noted above       Allergies   Allergen Reactions     Aspirin Hives   ,   Family History   Problem Relation Age of Onset     HEART DISEASE Father      Heart Disease, from CHF     Breast Cancer Mother      Cancer-breast, from Breast Cancer     HEART DISEASE Brother      Heart Disease,cardiac surgery, CABG     Other - See Comments Brother       polio     CANCER Brother      Cancer, from lung cancer     Genitourinary Problems Brother      Genitourinary Disease, liver disease, Agent Orange being a  in the [place] War;   ,   Current Outpatient Prescriptions   Medication     alfuzosin (UROXATRAL) 10 MG 24 hr tablet     No current facility-administered medications for this visit.    ,   Past Medical History:   Diagnosis Date     Abnormal findings on diagnostic imaging of other abdominal regions, including retroperitoneum     2001,cholelithiasis.     Dietary counseling and surveillance     Low carbohydrate diet.     Dorsalgia     2005,with radicular symptoms     Enlarged prostate without lower urinary tract symptoms (luts)     Benign with mild urinary obstruction symptoms.     Malignant neoplasm of prostate (H)     2011,He completed radiation treatment for prostate cancer on .  Initially had Radioactive palladium seed implants done followed by external beam radiation     Other specified abnormal findings of blood chemistry     mild hyperlipidemia. Negative for hemochromatosis.  Negative Lyme disease.     Other specified postprocedural states     Hyperplastic polyp at  20 cm     Other specified postprocedural states     05/16/2005,next due in 2015     Other symptoms and signs concerning food and fluid intake     Weight management     Personal history of other medical treatment (CODE)     08/07/2003,with Chest XR both normal.     Secondary polycythemia     3/29/2017   ,   Patient Active Problem List    Diagnosis Date Noted     Coronary artery calcification seen on CT scan, Score 1504 06/07/2018     Priority: Medium     Chest pain 05/24/2018     Priority: Medium     Obesity 02/02/2018     Priority: Medium     Polycythemia 03/29/2017     Priority: Medium     Counseling regarding advanced directives and goals of care 07/10/2015     Priority: Medium     Overview:   Wants only comfort cares if not cognitively intact. Does not want to go to skilled nursing facility.       ACP (advance care planning) 12/10/2013     Priority: Medium     Malignant neoplasm of prostate (H) 11/21/2011     Priority: Medium     Overview:   Completed radiation therapy in August of 2012 at [facility]       Hyperlipidemia 10/04/2011     Priority: Medium     Hearing loss, sensorineural 11/10/2009     Priority: Medium     Osteoarthrosis 11/10/2009     Priority: Medium     Seborrheic keratosis 11/10/2009     Priority: Medium     Tinnitus 11/10/2009     Priority: Medium   ,   Past Surgical History:   Procedure Laterality Date     COLONOSCOPY       Hyperplastic polyp at 20 cm.     COLONOSCOPY      5/16/05,next colonoscopy due in 2015     COLONOSCOPY      6/22/15,F/U 2025     OTHER SURGICAL HISTORY      2012,600000,OTHER,for prostate ca    and   Social History   Substance Use Topics     Smoking status: Former Smoker     Types: Cigarettes     Quit date: 1/1/1979     Smokeless tobacco: Never Used     Alcohol use No     OBJECTIVE:  /86 (BP Location: Right arm, Patient Position: Sitting, Cuff Size: Adult Large)  Pulse 68   EXAM:  Alert and cooperative, no distress.  Physical exam was not performed  today.  ASSESSMENT/Plan :    Heriberto was seen today for recheck.    Diagnoses and all orders for this visit:    Coronary artery calcification seen on CT scan, Score 1504  -     INTERNAL MEDICINE REFERRAL    Enlarged thoracic aorta (3.8 cm)    Discussed his CT cardiac calcium score and the fact that he has calcium deposit in his lipids.  I spoke to Dr. Allison a cardiologist from [facility] who recommended that he go ahead and pursue stress testing as despite his markedly elevated score there is still no evidence that he has obstructive coronary disease.  Referral sent for internal medicine for consultation with regard to best type of stress test and how best to proceed.  Discussed continuing with normal activity but being aware of symptoms and not pushing anything as far as physical exertion until he has his evaluation.    Recommended follow-up of his enlarged thoracic aorta in 1 year.    A total of 15 minutes was spent with the patient, greater than 50% of the time was spent in counseling/discussion of the aforementioned concerns.       Maurice Agustin MD

## 2018-06-07 NOTE — MR AVS SNAPSHOT
After Visit Summary   6/7/2018    Heriberto Noriega    MRN: [id number]           Patient Information     Date Of Birth          1945        Visit Information        Provider Department      6/7/2018 1:30 PM Maurice Agustin MD [facility]        Today's Diagnoses     Coronary artery calcification seen on CT scan, Score 1504    -  1    Enlarged thoracic aorta (3.8 cm)           Follow-ups after your visit        Additional Services     INTERNAL MEDICINE REFERRAL       Your provider has referred you to: Arvind Martinez MD.  Consultation for stress test.     Please be aware that coverage of these services is subject to the terms and limitations of your health insurance plan.  Call member services at your health plan with any benefit or coverage questions.      Please bring the following to your appointment:  >>   Any x-rays, CTs or MRIs which have been performed.  Contact the facility where they were done to arrange for  prior to your scheduled appointment.   >>   List of current medications   >>   This referral request   >>   Any documents/labs given to you for this referral                  Your next 10 appointments already scheduled     Jun 21, 2018 10:20 AM CDT   Office Visit with Arvind Martinez MD   [facility] ([facility])    [address]  [facility] [id number] [phone]           Bring a current list of meds and any records pertaining to this visit. For Physicals, please bring immunization records and any forms needing to be filled out. Please arrive 10 minutes early to complete paperwork.              Who to contact     If you have questions or need follow up information about today's clinic visit or your schedule please contact [facility] directly at [phone].  Normal or non-critical lab and imaging results will be communicated to you by MyChart, letter or phone within 4 business days  after the clinic has received the results. If you do not hear from us within 7 days, please contact the clinic through [employer] or phone. If you have a critical or abnormal lab result, we will notify you by phone as soon as possible.  Submit refill requests through [employer] or call your pharmacy and they will forward the refill request to us. Please allow 3 business days for your refill to be completed.          Additional Information About Your Visit        [employer] Information     [employer] gives you secure access to your electronic health record. If you see a primary care provider, you can also send messages to your care team and make appointments. If you have questions, please call your primary care clinic.  If you do not have a primary care provider, please call [phone] and they will assist you.        Care EveryWhere ID     This is your Care EveryWhere ID. This could be used by other organizations to access your [place] medical records  [id number]        Your Vitals Were     Pulse                   68            Blood Pressure from Last 3 Encounters:   06/07/18 134/86   05/30/18 134/84   05/25/18 (!) 129/91    Weight from Last 3 Encounters:   05/30/18 204 lb 9.6 oz (92.8 kg)   05/25/18 204 lb (92.5 kg)   11/28/17 189 lb 6.4 oz (85.9 kg)              We Performed the Following     INTERNAL MEDICINE REFERRAL        Primary Care Provider Office Phone # Fax #    Maurice V MD Vamsi [phone] [address] [facility] [address]        Equal Access to Services     [facility] HospitalHEIDY : Hadii aad ku hadasho Soomaali, waaxda luqadaha, qaybta kaalmada norisyajame, robert madden . So [facility] [phone].    ATENCIÓN: Si habla español, tiene a harry disposición servicios gratuitos de asistencia lingüística. Llame al [phone].    We comply with applicable federal civil rights laws and [place] laws. We do not discriminate on the basis of race, color, national origin,  age, disability, sex, sexual orientation, or gender identity.            Thank you!     Thank you for choosing [facility] AND [facility]  for your care. Our goal is always to provide you with excellent care. Hearing back from our patients is one way we can continue to improve our services. Please take a few minutes to complete the written survey that you may receive in the mail after your visit with us. Thank you!             Your Updated Medication List - Protect others around you: Learn how to safely use, store and throw away your medicines at www.disposemymeds.org.          This list is accurate as of 6/7/18  9:14 PM.  Always use your most recent med list.                   Brand Name Dispense Instructions for use Diagnosis    alfuzosin 10 MG 24 hr tablet    UROXATRAL    90 tablet    Take 1 tablet (10 mg) by mouth daily with food    Malignant neoplasm of prostate (H)

## 2018-06-07 NOTE — NURSING NOTE
He is here today to follow up after having a CT calcium score test.  Leanne Hargrove LPN..................6/7/2018   1:35 PM

## 2018-06-18 ENCOUNTER — MYC MEDICAL ADVICE (OUTPATIENT)
Dept: FAMILY MEDICINE | Facility: OTHER | Age: 73
End: 2018-06-18

## 2018-06-18 ENCOUNTER — MYC MEDICAL ADVICE (OUTPATIENT)
Dept: INTERNAL MEDICINE | Facility: OTHER | Age: 73
End: 2018-06-18

## 2018-06-19 ENCOUNTER — TELEPHONE (OUTPATIENT)
Dept: INTERNAL MEDICINE | Facility: OTHER | Age: 73
End: 2018-06-19

## 2018-06-19 ENCOUNTER — MYC MEDICAL ADVICE (OUTPATIENT)
Dept: FAMILY MEDICINE | Facility: OTHER | Age: 73
End: 2018-06-19

## 2018-06-19 NOTE — TELEPHONE ENCOUNTER
The patient was contacted and given the information about what his appointment on Thursday will be. Also a apology was given to the patient for having to wait for appointments and not hearing back in from the clinic in a adequate manor. The patient just has concerns about his health and is a little worried.  Bianca Young LPN on 6/19/2018 at 10:16 AM

## 2018-06-19 NOTE — TELEPHONE ENCOUNTER
The patient was contacted and given the information about the stress test consult and the process.  Bianca Young LPN on 6/19/2018 at 10:17 AM

## 2018-06-19 NOTE — TELEPHONE ENCOUNTER
MAF-Called pt for appt reminder for this Thursday. Pt very frustrated and unhappy with having to wait for appts and not getting answers to phone calls and my chart questions. His question today is what exactly is his appt for. It says consult stress test. He was assuming this should have been set up as a stress test per [facility]. Please call asap. Thank You.  Nena Rankin

## 2018-06-21 ENCOUNTER — OFFICE VISIT (OUTPATIENT)
Dept: INTERNAL MEDICINE | Facility: OTHER | Age: 73
End: 2018-06-21
Attending: INTERNAL MEDICINE
Payer: COMMERCIAL

## 2018-06-21 VITALS
DIASTOLIC BLOOD PRESSURE: 84 MMHG | HEART RATE: 68 BPM | BODY MASS INDEX: 31.79 KG/M2 | SYSTOLIC BLOOD PRESSURE: 132 MMHG | WEIGHT: 203 LBS

## 2018-06-21 DIAGNOSIS — I25.10 CORONARY ARTERY CALCIFICATION SEEN ON CT SCAN: Primary | ICD-10-CM

## 2018-06-21 DIAGNOSIS — E78.5 HYPERLIPIDEMIA, UNSPECIFIED HYPERLIPIDEMIA TYPE: ICD-10-CM

## 2018-06-21 PROCEDURE — G0463 HOSPITAL OUTPT CLINIC VISIT: HCPCS

## 2018-06-21 PROCEDURE — 99214 OFFICE O/P EST MOD 30 MIN: CPT | Performed by: INTERNAL MEDICINE

## 2018-06-21 RX ORDER — ROSUVASTATIN CALCIUM 20 MG/1
20 TABLET, COATED ORAL DAILY
Qty: 90 TABLET | Refills: 3 | Status: SHIPPED | OUTPATIENT
Start: 2018-06-21 | End: 2018-11-28

## 2018-06-21 ASSESSMENT — ENCOUNTER SYMPTOMS
SINUS PRESSURE: 0
NAUSEA: 0
FREQUENCY: 0
EYE REDNESS: 0
SEIZURES: 0
CHEST TIGHTNESS: 0
SORE THROAT: 0
TREMORS: 0
AGITATION: 0
BACK PAIN: 0
CHILLS: 0
SPEECH DIFFICULTY: 0
BRUISES/BLEEDS EASILY: 0
UNEXPECTED WEIGHT CHANGE: 0
LIGHT-HEADEDNESS: 0
NERVOUS/ANXIOUS: 0
HEMATURIA: 0
VOICE CHANGE: 0
ADENOPATHY: 0
HEADACHES: 0
WEAKNESS: 0
BLOOD IN STOOL: 0
DIZZINESS: 0
NECK STIFFNESS: 0
DIAPHORESIS: 0
NECK PAIN: 0
SINUS PAIN: 0
CONSTIPATION: 0
FEVER: 0
PALPITATIONS: 0
RHINORRHEA: 0
DIARRHEA: 0
DYSURIA: 0
DIFFICULTY URINATING: 0
ABDOMINAL PAIN: 0
VOMITING: 0
FATIGUE: 0
COUGH: 0
NUMBNESS: 0
HALLUCINATIONS: 0
WHEEZING: 0
ABDOMINAL DISTENTION: 0
SHORTNESS OF BREATH: 0
CONFUSION: 0
EYE PAIN: 0
FLANK PAIN: 0
JOINT SWELLING: 0
APPETITE CHANGE: 0
TROUBLE SWALLOWING: 0
SLEEP DISTURBANCE: 0
WOUND: 0

## 2018-06-21 NOTE — NURSING NOTE
The patient is here today to be seen for a consult on a possible stress test.  Bianca Young LPN on 6/21/2018 at 10:13 AM

## 2018-06-21 NOTE — PROGRESS NOTES
Chief Complaint:  This patient is here for a consultation regarding coronary artery calcifications.    HPI: Consultation is requested by Dr. Agustin.  This patient was found to have an elevated CT calcium score at over 1500.  This was done because of some questions of chest pain.  He was hospitalized in May with a complaint of chest pain and ruled out for myocardial infarction with a negative and normal EKG and negative troponins.  The patient exercises regularly including walking 5 miles a day or working in the yard without any symptoms of chest pain or problems.  He does not have any shortness of breath that he would consider unusual.  He does not have any other cardiopulmonary symptoms or history.    He was scheduled initially for a stress test but had a CT calcium score done first.  His score was over 1500 so further discussion was held.  It was recommended that he come in to talk about stress testing and further management.    The patient has minimal elevation in his cholesterol in the past.  He does not have hypertension.  He is somewhat overweight.  He does not have diabetes.  He no longer smokes cigarettes.  Family history is positive for heart disease as updated below.    Medications are reconciled.  Past medical history, past surgical history, family history and social histories are reviewed and updated.    Past Medical History:   Diagnosis Date     Cholelithiasis      Coronary artery calcification      Dietary counseling and surveillance     Low carbohydrate diet.     Dorsalgia     12/13/2005,with radicular symptoms     Enlarged prostate without lower urinary tract symptoms (luts)     Benign with mild urinary obstruction symptoms.     Hyperlipidemia      Malignant neoplasm of prostate (H)     11/21/2011,He completed radiation treatment for prostate cancer on August 17 of 2012.  Initially had Radioactive palladium seed implants done followed by external beam radiation     Secondary polycythemia      3/29/2017       Past Surgical History:   Procedure Laterality Date     COLONOSCOPY       Hyperplastic polyp at 20 cm.     COLONOSCOPY      05,next colonoscopy due in      COLONOSCOPY      6/22/15,F/U      PROSTATE SURGERY      Seeds and radiation       Current Outpatient Prescriptions   Medication Sig Dispense Refill     alfuzosin (UROXATRAL) 10 MG 24 hr tablet Take 1 tablet (10 mg) by mouth daily with food 90 tablet 3     rosuvastatin (CRESTOR) 20 MG tablet Take 1 tablet (20 mg) by mouth daily 90 tablet 3       Allergies   Allergen Reactions     Aspirin Hives       Family History   Problem Relation Age of Onset     HEART DISEASE Father       from CHF     Breast Cancer Mother       from Breast Cancer     Other - See Comments Brother      Agent orange exposure     Breast Cancer Sister      HEART DISEASE Brother      Heart Disease,cardiac surgery, CABG     Other - See Comments Brother       polio     Cancer Brother      Cancer, from lung cancer       Social History     Social History     Marital status:      Spouse name: N/A     Number of children: N/A     Years of education: N/A     Occupational History     Not on file.     Social History Main Topics     Smoking status: Former Smoker     Types: Cigarettes     Quit date: 1979     Smokeless tobacco: Never Used     Alcohol use No     Drug use: No      Comment: Drug use: No     Sexual activity: Not on file     Other Topics Concern     Not on file     Social History Narrative    Previously had been on the Atkins diet.  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      Recently had colonoscopy.  The patient has minimal use of caffeine and sugars.      Jennifer-Wife; in good health    Children:  2 adult children in their 30s and 3 grandchildren    Patient is a former smoker, cigarettes quit 30+ yrs ago.     Alcohol Use - yes, 1 drink per week       Review of Systems   Constitutional: Negative for appetite change, chills, diaphoresis, fatigue,  fever and unexpected weight change.   HENT: Negative for ear pain, rhinorrhea, sinus pain, sinus pressure, sore throat, trouble swallowing and voice change.    Eyes: Negative for pain, redness and visual disturbance.   Respiratory: Negative for cough, chest tightness, shortness of breath and wheezing.    Cardiovascular: Negative for chest pain, palpitations and leg swelling.   Gastrointestinal: Negative for abdominal distention, abdominal pain, blood in stool, constipation, diarrhea, nausea and vomiting.   Endocrine: Negative for cold intolerance and heat intolerance.   Genitourinary: Negative for difficulty urinating, dysuria, flank pain, frequency and hematuria.   Musculoskeletal: Negative for back pain, joint swelling, neck pain and neck stiffness.   Skin: Negative for rash and wound.   Allergic/Immunologic: Negative for immunocompromised state.   Neurological: Negative for dizziness, tremors, seizures, syncope, speech difficulty, weakness, light-headedness, numbness and headaches.   Hematological: Negative for adenopathy. Does not bruise/bleed easily.   Psychiatric/Behavioral: Negative for agitation, behavioral problems, confusion, hallucinations and sleep disturbance. The patient is not nervous/anxious.        Physical Exam   Constitutional: He appears well-developed and well-nourished. No distress.   HENT:   Head: Normocephalic.   Neck: Normal range of motion. Neck supple. Normal carotid pulses and no JVD present. Carotid bruit is not present. No tracheal deviation present. No thyromegaly present.   Cardiovascular: Normal rate, regular rhythm and normal heart sounds.  Exam reveals no gallop and no friction rub.    No murmur heard.  Pulmonary/Chest: Effort normal and breath sounds normal. No respiratory distress. He has no wheezes. He has no rales.   Musculoskeletal: He exhibits no edema.   Lymphadenopathy:     He has no cervical adenopathy.   Neurological: He is alert.   Skin: Skin is warm and dry. He is not  diaphoretic.   Psychiatric: He has a normal mood and affect.   Nursing note and vitals reviewed.      Assessment:      ICD-10-CM    1. Coronary artery calcification seen on CT scan, Score 1504 I25.10 Echo Stress Test With Definity   2. Hyperlipidemia, unspecified hyperlipidemia type E78.5 rosuvastatin (CRESTOR) 20 MG tablet        Plan: I reviewed the patient's CT scan with him.  He has significant coronary artery calcification.  He does not have any anginal symptoms.  Due to the degree of calcification, stress testing is warranted.  We will get him scheduled for a stress echo in the near future.  He has an allergy to aspirin so he will not be placed on that.  I did recommend statin therapy and he is willing to start on this.  He is started on moderate intensity statin therapy with Crestor 20 mg daily.  Warned of side effects.  He will need a recheck lipid and AST in 6 weeks.

## 2018-06-21 NOTE — MR AVS SNAPSHOT
After Visit Summary   6/21/2018    Heriberto Noriega    MRN: [id number]           Patient Information     Date Of Birth          1945        Visit Information        Provider Department      6/21/2018 10:20 AM Arvind Martinez MD [facility]        Today's Diagnoses     Coronary artery calcification seen on CT scan, Score 1504    -  1    Hyperlipidemia, unspecified hyperlipidemia type           Follow-ups after your visit        Your next 10 appointments already scheduled     Jul 10, 2018  2:00 PM CDT   GH STRESS with  Cr Nurse, [facility] STR[facility]OM [facility]   [facility]    [address]  [place] [address]   [phone]            Jul 10, 2018  2:30 PM CDT   Ech Stress Test With Definity with [facility]   [facility]    [address]  [place] [address]   [phone]           1.  Please bring or wear a comfortable two-piece outfit and walking shoes. 2.  Stop eating 3 hours before the test. You may drink water or juice. 3.  Stop all caffeine 12 hours before the test. This includes coffee, tea, soda pop, chocolate and certain medicines (such as Anacin and Excederin). Also avoid decaf coffee and tea, as these contain small amounts of caffeine. 4.  No alcohol, smoking or use of other tobacco products for 12 hours before the test. 5.  Refer to your provider instructions to see if you need to stop any medications (such as beta-blockers or nitrates) for this test. 6.  For patients with diabetes: -   If you take insulin, call your diabetes care team. Ask if you should take a   dose the morning of your test. -   If you take diabetes medicine by mouth, don't take it on the morning of your test. Bring it with you to take after the test.  (If you have questions, call your diabetes care team) 7.  When you arrive, please tell us if: -   You have diabetes. -   You  have taken Viagra, Cialis or Levitra in the past 48 hours. 8.  For any questions that cannot be answered, please contact the ordering physician            Jul 10, 2018  3:00 PM ADONAY   Stress MD with [facility]ind [facility] MD   [facility]    [address]  [place] [id number] [phone]           Please arrive at the time given for your first appointment. This visit is used internally to schedule the physician's time during your ultrasound.              Future tests that were ordered for you today     Open Future Orders        Priority Expected Expires Ordered    Echo Stress Test With Definity Routine  6/21/2019 6/21/2018            Who to contact     If you have questions or need follow up information about today's clinic visit or your schedule please contact [facility] directly at [phone].  Normal or non-critical lab and imaging results will be communicated to you by [employer], letter or phone within 4 business days after the clinic has received the results. If you do not hear from us within 7 days, please contact the clinic through [employer] or phone. If you have a critical or abnormal lab result, we will notify you by phone as soon as possible.  Submit refill requests through [employer] or call your pharmacy and they will forward the refill request to us. Please allow 3 business days for your refill to be completed.          Additional Information About Your Visit        [employer] Information     [employer] gives you secure access to your electronic health record. If you see a primary care provider, you can also send messages to your care team and make appointments. If you have questions, please call your primary care clinic.  If you do not have a primary care provider, please call [phone] and they will assist you.        Care EveryWhere ID     This is your Care EveryWhere ID. This could be used by other organizations to access  your [facility] medical records  [id number]        Your Vitals Were     Pulse BMI (Body Mass Index)                68 31.79 kg/m2           Blood Pressure from Last 3 Encounters:   06/21/18 132/84   06/07/18 134/86   05/30/18 134/84    Weight from Last 3 Encounters:   06/21/18 203 lb (92.1 kg)   05/30/18 204 lb 9.6 oz (92.8 kg)   05/25/18 204 lb (92.5 kg)                 Today's Medication Changes          These changes are accurate as of 6/21/18 10:52 AM.  If you have any questions, ask your nurse or doctor.               Start taking these medicines.        Dose/Directions    rosuvastatin 20 MG tablet   Commonly known as:  CRESTOR   Used for:  Hyperlipidemia, unspecified hyperlipidemia type   Started by:  Arvind Martinez MD        Dose:  20 mg   Take 1 tablet (20 mg) by mouth daily   Quantity:  90 tablet   Refills:  3            Where to get your medicines      These medications were sent to [facility] [id number] [facility] - [place], [place] - [address]  [address] [facility] [address]     Phone:  [phone]     rosuvastatin 20 MG tablet                Primary Care Provider Office Phone # Fax #    Maurice BHAT MD Vamsi [phone]       [address] [facility] [address]        Equal Access to Services     [facility]: Hadii kal ku hadasho Socamacho, waaxda luqadaha, qaybta kaalmada adeprachiyada, robert zapien. So [facility] [phone].    ATENCIÓN: Si habla español, tiene a harry disposición servicios gratuitos de asistencia lingüística. Llcaro al [phone].    We comply with applicable federal civil rights laws and [place] laws. We do not discriminate on the basis of race, color, national origin, age, disability, sex, sexual orientation, or gender identity.            Thank you!     Thank you for choosing [facility] AND [facility]  for your care. Our goal is always to provide you with excellent care. Hearing back from our patients is one way we  can continue to improve our services. Please take a few minutes to complete the written survey that you may receive in the mail after your visit with us. Thank you!             Your Updated Medication List - Protect others around you: Learn how to safely use, store and throw away your medicines at www.disposemymeds.org.          This list is accurate as of 6/21/18 10:52 AM.  Always use your most recent med list.                   Brand Name Dispense Instructions for use Diagnosis    alfuzosin 10 MG 24 hr tablet    UROXATRAL    90 tablet    Take 1 tablet (10 mg) by mouth daily with food    Malignant neoplasm of prostate (H)       rosuvastatin 20 MG tablet    CRESTOR    90 tablet    Take 1 tablet (20 mg) by mouth daily    Hyperlipidemia, unspecified hyperlipidemia type

## 2018-06-22 ASSESSMENT — PATIENT HEALTH QUESTIONNAIRE - PHQ9: SUM OF ALL RESPONSES TO PHQ QUESTIONS 1-9: 0

## 2018-07-09 ENCOUNTER — TELEPHONE (OUTPATIENT)
Dept: CARDIOLOGY | Facility: OTHER | Age: 73
End: 2018-07-09

## 2018-07-10 ENCOUNTER — HOSPITAL ENCOUNTER (OUTPATIENT)
Dept: CARDIOLOGY | Facility: OTHER | Age: 73
Discharge: HOME | End: 2018-07-10
Attending: INTERNAL MEDICINE | Admitting: INTERNAL MEDICINE
Payer: COMMERCIAL

## 2018-07-10 ENCOUNTER — HOSPITAL ENCOUNTER (OUTPATIENT)
Dept: CARDIOLOGY | Facility: OTHER | Age: 73
End: 2018-07-10
Attending: INTERNAL MEDICINE
Payer: COMMERCIAL

## 2018-07-10 DIAGNOSIS — I25.10 CORONARY ARTERY CALCIFICATION SEEN ON CT SCAN: ICD-10-CM

## 2018-07-10 PROCEDURE — 93325 DOPPLER ECHO COLOR FLOW MAPG: CPT | Mod: TC

## 2018-07-10 PROCEDURE — 93017 CV STRESS TEST TRACING ONLY: CPT

## 2018-07-10 PROCEDURE — 93321 DOPPLER ECHO F-UP/LMTD STD: CPT | Mod: 26 | Performed by: INTERNAL MEDICINE

## 2018-07-10 PROCEDURE — 93016 CV STRESS TEST SUPVJ ONLY: CPT | Performed by: INTERNAL MEDICINE

## 2018-07-10 PROCEDURE — 25500064 ZZH RX 255 OP 636: Performed by: INTERNAL MEDICINE

## 2018-07-10 PROCEDURE — 93018 CV STRESS TEST I&R ONLY: CPT | Performed by: INTERNAL MEDICINE

## 2018-07-10 PROCEDURE — 93350 STRESS TTE ONLY: CPT | Mod: 26 | Performed by: INTERNAL MEDICINE

## 2018-07-10 PROCEDURE — 93325 DOPPLER ECHO COLOR FLOW MAPG: CPT | Mod: 26 | Performed by: INTERNAL MEDICINE

## 2018-07-10 RX ADMIN — PERFLUTREN 4 ML: 6.52 INJECTION, SUSPENSION INTRAVENOUS at 15:00

## 2018-07-10 NOTE — PROGRESS NOTES
1400:  The patient arrived for a stress echo.  The procedure, risks and benefits were discussed and the consent was signed.  The patient was prepped for the stress test, and the echo sonographer did the initial images with definity for image enhancement.    arrived, and the patient walked 7minutes and 49seconds.  The patient tolerated the procedure .  Stress images were completed and the patient was released in stable condition.  Please see the chart for complete test results.

## 2018-07-11 ENCOUNTER — MYC MEDICAL ADVICE (OUTPATIENT)
Dept: INTERNAL MEDICINE | Facility: OTHER | Age: 73
End: 2018-07-11

## 2018-07-11 DIAGNOSIS — I25.10 CORONARY ARTERY CALCIFICATION SEEN ON CT SCAN: Primary | ICD-10-CM

## 2018-07-18 ENCOUNTER — MYC MEDICAL ADVICE (OUTPATIENT)
Dept: FAMILY MEDICINE | Facility: OTHER | Age: 73
End: 2018-07-18

## 2018-07-18 DIAGNOSIS — I25.10 CORONARY ARTERY CALCIFICATION SEEN ON CT SCAN: Primary | ICD-10-CM

## 2018-07-18 DIAGNOSIS — E78.5 HYPERLIPIDEMIA, UNSPECIFIED HYPERLIPIDEMIA TYPE: ICD-10-CM

## 2018-08-01 DIAGNOSIS — I25.10 CORONARY ARTERY CALCIFICATION SEEN ON CT SCAN: ICD-10-CM

## 2018-08-01 LAB
ALT SERPL W P-5'-P-CCNC: 39 U/L (ref 7–52)
AST SERPL W P-5'-P-CCNC: 25 U/L (ref 13–39)
CHOLEST SERPL-MCNC: 120 MG/DL
HDLC SERPL-MCNC: 37 MG/DL (ref 23–92)
LDLC SERPL CALC-MCNC: 48 MG/DL
NONHDLC SERPL-MCNC: 83 MG/DL
TRIGL SERPL-MCNC: 174 MG/DL

## 2018-08-01 PROCEDURE — 84450 TRANSFERASE (AST) (SGOT): CPT | Performed by: INTERNAL MEDICINE

## 2018-08-01 PROCEDURE — 84460 ALANINE AMINO (ALT) (SGPT): CPT | Performed by: INTERNAL MEDICINE

## 2018-08-01 PROCEDURE — 36415 COLL VENOUS BLD VENIPUNCTURE: CPT | Performed by: INTERNAL MEDICINE

## 2018-08-01 PROCEDURE — 80061 LIPID PANEL: CPT | Performed by: INTERNAL MEDICINE

## 2018-09-10 ENCOUNTER — MYC MEDICAL ADVICE (OUTPATIENT)
Dept: FAMILY MEDICINE | Facility: OTHER | Age: 73
End: 2018-09-10

## 2018-09-10 DIAGNOSIS — C61 MALIGNANT NEOPLASM OF PROSTATE (H): Primary | ICD-10-CM

## 2018-10-08 DIAGNOSIS — C61 MALIGNANT NEOPLASM OF PROSTATE (H): ICD-10-CM

## 2018-10-08 LAB — PSA SERPL-MCNC: 0.04 NG/ML

## 2018-10-08 PROCEDURE — 36415 COLL VENOUS BLD VENIPUNCTURE: CPT | Performed by: FAMILY MEDICINE

## 2018-10-08 PROCEDURE — 84153 ASSAY OF PSA TOTAL: CPT | Performed by: FAMILY MEDICINE

## 2018-11-28 ENCOUNTER — OFFICE VISIT (OUTPATIENT)
Dept: FAMILY MEDICINE | Facility: OTHER | Age: 73
End: 2018-11-28
Attending: FAMILY MEDICINE
Payer: COMMERCIAL

## 2018-11-28 VITALS
RESPIRATION RATE: 16 BRPM | TEMPERATURE: 97.6 F | BODY MASS INDEX: 33.91 KG/M2 | HEART RATE: 80 BPM | DIASTOLIC BLOOD PRESSURE: 86 MMHG | WEIGHT: 211 LBS | SYSTOLIC BLOOD PRESSURE: 134 MMHG | HEIGHT: 66 IN

## 2018-11-28 DIAGNOSIS — I25.10 CORONARY ARTERY CALCIFICATION SEEN ON CT SCAN: ICD-10-CM

## 2018-11-28 DIAGNOSIS — D75.1 POLYCYTHEMIA: ICD-10-CM

## 2018-11-28 DIAGNOSIS — Z87.891 HISTORY OF SMOKING: ICD-10-CM

## 2018-11-28 DIAGNOSIS — M19.91 PRIMARY OSTEOARTHRITIS, UNSPECIFIED SITE: ICD-10-CM

## 2018-11-28 DIAGNOSIS — C61 MALIGNANT NEOPLASM OF PROSTATE (H): ICD-10-CM

## 2018-11-28 DIAGNOSIS — E78.5 HYPERLIPIDEMIA, UNSPECIFIED HYPERLIPIDEMIA TYPE: Primary | ICD-10-CM

## 2018-11-28 DIAGNOSIS — Z13.6 SCREENING FOR AAA (ABDOMINAL AORTIC ANEURYSM): ICD-10-CM

## 2018-11-28 DIAGNOSIS — H90.3 SENSORINEURAL HEARING LOSS (SNHL) OF BOTH EARS: ICD-10-CM

## 2018-11-28 LAB
ALBUMIN SERPL-MCNC: 4.5 G/DL (ref 3.5–5.7)
ALP SERPL-CCNC: 85 U/L (ref 34–104)
ALT SERPL W P-5'-P-CCNC: 86 U/L (ref 7–52)
ANION GAP SERPL CALCULATED.3IONS-SCNC: 8 MMOL/L (ref 3–14)
AST SERPL W P-5'-P-CCNC: 45 U/L (ref 13–39)
BASOPHILS # BLD AUTO: 0.1 10E9/L (ref 0–0.2)
BASOPHILS NFR BLD AUTO: 1.3 %
BILIRUB SERPL-MCNC: 1.4 MG/DL (ref 0.3–1)
BUN SERPL-MCNC: 26 MG/DL (ref 7–25)
CALCIUM SERPL-MCNC: 9.8 MG/DL (ref 8.6–10.3)
CHLORIDE SERPL-SCNC: 105 MMOL/L (ref 98–107)
CHOLEST SERPL-MCNC: 123 MG/DL
CO2 SERPL-SCNC: 26 MMOL/L (ref 21–31)
CREAT SERPL-MCNC: 0.74 MG/DL (ref 0.7–1.3)
DIFFERENTIAL METHOD BLD: ABNORMAL
EOSINOPHIL # BLD AUTO: 0.3 10E9/L (ref 0–0.7)
EOSINOPHIL NFR BLD AUTO: 5.4 %
ERYTHROCYTE [DISTWIDTH] IN BLOOD BY AUTOMATED COUNT: 12.6 % (ref 10–15)
GFR SERPL CREATININE-BSD FRML MDRD: >90 ML/MIN/1.7M2
GLUCOSE SERPL-MCNC: 110 MG/DL (ref 70–105)
HCT VFR BLD AUTO: 50.7 % (ref 40–53)
HDLC SERPL-MCNC: 37 MG/DL (ref 23–92)
HGB BLD-MCNC: 17.6 G/DL (ref 13.3–17.7)
IMM GRANULOCYTES # BLD: 0 10E9/L (ref 0–0.4)
IMM GRANULOCYTES NFR BLD: 0.4 %
LDLC SERPL CALC-MCNC: 55 MG/DL
LYMPHOCYTES # BLD AUTO: 1.5 10E9/L (ref 0.8–5.3)
LYMPHOCYTES NFR BLD AUTO: 28.3 %
MCH RBC QN AUTO: 31.8 PG (ref 26.5–33)
MCHC RBC AUTO-ENTMCNC: 34.7 G/DL (ref 31.5–36.5)
MCV RBC AUTO: 92 FL (ref 78–100)
MONOCYTES # BLD AUTO: 0.5 10E9/L (ref 0–1.3)
MONOCYTES NFR BLD AUTO: 9.1 %
NEUTROPHILS # BLD AUTO: 3 10E9/L (ref 1.6–8.3)
NEUTROPHILS NFR BLD AUTO: 55.5 %
NONHDLC SERPL-MCNC: 86 MG/DL
PLATELET # BLD AUTO: 143 10E9/L (ref 150–450)
POTASSIUM SERPL-SCNC: 4.3 MMOL/L (ref 3.5–5.1)
PROT SERPL-MCNC: 7.4 G/DL (ref 6.4–8.9)
RBC # BLD AUTO: 5.54 10E12/L (ref 4.4–5.9)
SODIUM SERPL-SCNC: 139 MMOL/L (ref 134–144)
TRIGL SERPL-MCNC: 156 MG/DL
WBC # BLD AUTO: 5.4 10E9/L (ref 4–11)

## 2018-11-28 PROCEDURE — 80053 COMPREHEN METABOLIC PANEL: CPT | Performed by: FAMILY MEDICINE

## 2018-11-28 PROCEDURE — 85025 COMPLETE CBC W/AUTO DIFF WBC: CPT | Performed by: FAMILY MEDICINE

## 2018-11-28 PROCEDURE — G0463 HOSPITAL OUTPT CLINIC VISIT: HCPCS

## 2018-11-28 PROCEDURE — 36415 COLL VENOUS BLD VENIPUNCTURE: CPT | Performed by: FAMILY MEDICINE

## 2018-11-28 PROCEDURE — 80061 LIPID PANEL: CPT | Performed by: FAMILY MEDICINE

## 2018-11-28 PROCEDURE — 99214 OFFICE O/P EST MOD 30 MIN: CPT | Performed by: FAMILY MEDICINE

## 2018-11-28 RX ORDER — ALFUZOSIN HYDROCHLORIDE 10 MG/1
10 TABLET, EXTENDED RELEASE ORAL
Qty: 90 TABLET | Refills: 3 | Status: SHIPPED | OUTPATIENT
Start: 2018-11-28 | End: 2019-08-06

## 2018-11-28 RX ORDER — ROSUVASTATIN CALCIUM 20 MG/1
20 TABLET, COATED ORAL DAILY
Qty: 90 TABLET | Refills: 3 | Status: SHIPPED | OUTPATIENT
Start: 2018-11-28 | End: 2019-08-06

## 2018-11-28 ASSESSMENT — PAIN SCALES - GENERAL: PAINLEVEL: NO PAIN (0)

## 2018-11-28 NOTE — NURSING NOTE
"Chief Complaint   Patient presents with     Recheck Medication       Initial /86  Pulse 80  Temp 97.6  F (36.4  C) (Tympanic)  Resp 16  Ht 5' 6.25\" (1.683 m)  Wt 211 lb (95.7 kg)  BMI 33.8 kg/m2 Estimated body mass index is 33.8 kg/(m^2) as calculated from the following:    Height as of this encounter: 5' 6.25\" (1.683 m).    Weight as of this encounter: 211 lb (95.7 kg).  Medication Reconciliation: complete    Leanne Hargrove LPN  "

## 2018-11-28 NOTE — PROGRESS NOTES
"Nursing Notes:   Leanne Hargrove LPN  2018  8:47 AM  Signed  Chief Complaint   Patient presents with     Recheck Medication       Initial /86  Pulse 80  Temp 97.6  F (36.4  C) (Tympanic)  Resp 16  Ht 5' 6.25\" (1.683 m)  Wt 211 lb (95.7 kg)  BMI 33.8 kg/m2 Estimated body mass index is 33.8 kg/(m^2) as calculated from the following:    Height as of this encounter: 5' 6.25\" (1.683 m).    Weight as of this encounter: 211 lb (95.7 kg).  Medication Reconciliation: complete    Leanne Hargrove LPN      SUBJECTIVE:  Heriberto Noriega  is a 73 year old male who comes in today for complete evaluation and renewal of medications.    He saw the cardiologist a couple months ago because of a coronary calcium score 1500.  He had a normal stress echo.  He typically walks more than 5 miles a day without symptoms.  We discussed that his risk of heart attack in the next 10 years is about 24% with his score factor and into a risk calculator.  It was recommended that he stay on Crestor 20 mg daily.  Aspirin would be recommended except for previous allergy.    He had previous treatment for prostate cancer and his PSA done in October was 0.045.  Lipids done in August showed cholesterol 120 with an HDL of 37 and LDL 48 on his current dose of Crestor.    He is in need of flu shot and has not had Shingrix.  Otherwise immunizations are up-to-date.  It appears he has not had Prevnar.  He has not had abdominal aortic aneurysm screening.     He is getting over a cold.    Past Medical, Family, and Social History reviewed and updated as noted below.   ROS is negative except as noted above       Allergies   Allergen Reactions     Aspirin Hives   ,   Family History   Problem Relation Age of Onset     Heart Disease Father       from CHF     Breast Cancer Mother       from Breast Cancer     Other - See Comments Brother      Agent orange exposure     Breast Cancer Sister      Heart Disease Brother      Heart Disease,cardiac " surgery, CABG     Other - See Comments Brother       polio     Cancer Brother      Cancer, from lung cancer   ,   Current Outpatient Prescriptions   Medication     alfuzosin ER (UROXATRAL) 10 MG 24 hr tablet     rosuvastatin (CRESTOR) 20 MG tablet     [DISCONTINUED] rosuvastatin (CRESTOR) 20 MG tablet     No current facility-administered medications for this visit.    ,   Past Medical History:   Diagnosis Date     Cholelithiasis      Coronary artery calcification      Dietary counseling and surveillance     Low carbohydrate diet.     Dorsalgia     2005,with radicular symptoms     Enlarged prostate without lower urinary tract symptoms (luts)     Benign with mild urinary obstruction symptoms.     Hyperlipidemia      Malignant neoplasm of prostate (H)     2011,He completed radiation treatment for prostate cancer on .  Initially had Radioactive palladium seed implants done followed by external beam radiation     Secondary polycythemia     3/29/2017   ,   Patient Active Problem List    Diagnosis Date Noted     Coronary artery calcification seen on CT scan, Score 1504 2018     Priority: Medium     Chest pain 2018     Priority: Medium     Obesity 2018     Priority: Medium     Polycythemia 2017     Priority: Medium     Counseling regarding advanced directives and goals of care 07/10/2015     Priority: Medium     Overview:   Wants only comfort cares if not cognitively intact. Does not want to go to skilled nursing facility.       ACP (advance care planning) 12/10/2013     Priority: Medium     Malignant neoplasm of prostate (H) 2011     Priority: Medium     Overview:   Completed radiation therapy in 2012 at [facility]       Hyperlipidemia 10/04/2011     Priority: Medium     Hearing loss, sensorineural 11/10/2009     Priority: Medium     Osteoarthrosis 11/10/2009     Priority: Medium     Seborrheic keratosis 11/10/2009     Priority: Medium      "Tinnitus 11/10/2009     Priority: Medium   ,   Past Surgical History:   Procedure Laterality Date     COLONOSCOPY       Hyperplastic polyp at 20 cm.     COLONOSCOPY      5/16/05,next colonoscopy due in 2015     COLONOSCOPY      6/22/15,F/U 2025     PROSTATE SURGERY      Seeds and radiation    and   Social History   Substance Use Topics     Smoking status: Former Smoker     Types: Cigarettes     Quit date: 1/1/1979     Smokeless tobacco: Never Used     Alcohol use No     OBJECTIVE:  /86  Pulse 80  Temp 97.6  F (36.4  C) (Tympanic)  Resp 16  Ht 5' 6.25\" (1.683 m)  Wt 211 lb (95.7 kg)  BMI 33.8 kg/m2   EXAM:  General Appearance: Pleasant, alert, appropriate appearance for age. No acute distress  Head Exam: Normal. Normocephalic, atraumatic.  Eye Exam:  Normal external eye, conjunctiva, lids, cornea. LAURITA. EOMI  Ear Exam: Normal TM's bilaterally. Normal auditory canals and external ears. Non-tender.  Nose Exam: Normal external nose, mucus membranes, and septum.  OroPharynx Exam:  Dental hygiene adequate. Normal buccal mucosa. Normal pharynx.  Neck Exam:  Supple, no masses or nodes. No audible bruits  Thyroid Exam: No nodules or enlargement.  Chest/Respiratory Exam: Normal chest wall and respirations. Clear to auscultation.  Cardiovascular Exam: Regular rate and rhythm. S1, S2, no murmur, click, gallop, or rubs.  Gastrointestinal Exam: Soft, non-tender, no masses or organomegaly.  Lymphatic Exam: Non-palpable nodes in neck, clavicular regions.  Musculoskeletal Exam: Back is straight and non-tender, full ROM of upper and lower extremities.  Foot Exam: Left and right foot: good pedal pulses  Skin: no rash or abnormalities  Neurologic Exam: Nonfocal, normal gross motor, tone coordination and no tremor.  Psychiatric Exam: Alert and oriented - appropriate affect.     Results for orders placed or performed in visit on 11/28/18   CBC with platelets differential   Result Value Ref Range    WBC 5.4 4.0 - 11.0 10e9/L "    RBC Count 5.54 4.4 - 5.9 10e12/L    Hemoglobin 17.6 13.3 - 17.7 g/dL    Hematocrit 50.7 40.0 - 53.0 %    MCV 92 78 - 100 fl    MCH 31.8 26.5 - 33.0 pg    MCHC 34.7 31.5 - 36.5 g/dL    RDW 12.6 10.0 - 15.0 %    Platelet Count 143 (L) 150 - 450 10e9/L    Diff Method Automated Method     % Neutrophils 55.5 %    % Lymphocytes 28.3 %    % Monocytes 9.1 %    % Eosinophils 5.4 %    % Basophils 1.3 %    % Immature Granulocytes 0.4 %    Absolute Neutrophil 3.0 1.6 - 8.3 10e9/L    Absolute Lymphocytes 1.5 0.8 - 5.3 10e9/L    Absolute Monocytes 0.5 0.0 - 1.3 10e9/L    Absolute Eosinophils 0.3 0.0 - 0.7 10e9/L    Absolute Basophils 0.1 0.0 - 0.2 10e9/L    Abs Immature Granulocytes 0.0 0 - 0.4 10e9/L   Comprehensive metabolic panel   Result Value Ref Range    Sodium 139 134 - 144 mmol/L    Potassium 4.3 3.5 - 5.1 mmol/L    Chloride 105 98 - 107 mmol/L    Carbon Dioxide 26 21 - 31 mmol/L    Anion Gap 8 3 - 14 mmol/L    Glucose 110 (H) 70 - 105 mg/dL    Urea Nitrogen 26 (H) 7 - 25 mg/dL    Creatinine 0.74 0.70 - 1.30 mg/dL    GFR Estimate >90 >60 mL/min/1.7m2    GFR Estimate If Black >90 >60 mL/min/1.7m2    Calcium 9.8 8.6 - 10.3 mg/dL    Bilirubin Total 1.4 (H) 0.3 - 1.0 mg/dL    Albumin 4.5 3.5 - 5.7 g/dL    Protein Total 7.4 6.4 - 8.9 g/dL    Alkaline Phosphatase 85 34 - 104 U/L    ALT 86 (H) 7 - 52 U/L    AST 45 (H) 13 - 39 U/L   Lipid Profile   Result Value Ref Range    Cholesterol 123 <200 mg/dL    Triglycerides 156 (H) <150 mg/dL    HDL Cholesterol 37 23 - 92 mg/dL    LDL Cholesterol Calculated 55 <100 mg/dL    Non HDL Cholesterol 86 <130 mg/dL      ASSESSMENT/Plan :    Heriberto was seen today for recheck medication.    Diagnoses and all orders for this visit:    Hyperlipidemia, unspecified hyperlipidemia type  -     rosuvastatin (CRESTOR) 20 MG tablet; Take 1 tablet (20 mg) by mouth daily  -     Comprehensive metabolic panel; Future  -     Lipid Profile; Future    Malignant neoplasm of prostate (H)  -     alfuzosin ER  (UROXATRAL) 10 MG 24 hr tablet; Take 1 tablet (10 mg) by mouth daily with food    Screening for AAA (abdominal aortic aneurysm)  -     Abdominal Aortic Aneurysm Screening/Tracking  -     [place] Aorta Medicare AAA Screening; Future    History of smoking  -     Abdominal Aortic Aneurysm Screening/Tracking  -     [place] Aorta Medicare AAA Screening; Future    Coronary artery calcification seen on CT scan, Score 1504    Polycythemia  -     CBC with platelets differential; Future    Primary osteoarthritis, unspecified site  -     Comprehensive metabolic panel; Future    Sensorineural hearing loss (SNHL) of both ears    Other orders  -     Cancel: GH IMM-  HC FLU VACCINE, INCREASED ANTIGEN, PRESV FREE      Will notify of lab results when available. Discussed diet, exercise and healthy lifestyle changes. Continue current medications. AAA screen ordered.  Wants to come back for flu shot and Prevnar.     Maurice Agustin MD

## 2018-11-28 NOTE — MR AVS SNAPSHOT
After Visit Summary   11/28/2018    Heriberto Noriega    MRN: [id number]           Patient Information     Date Of Birth          1945        Visit Information        Provider Department      11/28/2018 8:30 AM Maurice Agustin MD [facility]        Today's Diagnoses     Hyperlipidemia, unspecified hyperlipidemia type    -  1    Malignant neoplasm of prostate (H)        Screening for AAA (abdominal aortic aneurysm)        History of smoking        Coronary artery calcification seen on CT scan, Score 1504        Polycythemia        Primary osteoarthritis, unspecified site        Sensorineural hearing loss (SNHL) of both ears           Follow-ups after your visit        Your next 10 appointments already scheduled     Nov 29, 2018 12:45 PM CST   Nurse Only with GH INJECTION NURSE   [facility] ([facility])    [address]  [place] [address]   [phone]            Dec 04, 2018  8:30 AM CST   US AORTA MEDICARE AAA SCREENING with GHUS2   [facility] ([facility])    [address]  [place] [address]   [phone]           How do I prepare for my exam? (Food and drink instructions) Adults: No eating, smoking, gum chewing or drinking for 8 hours before the exam. You may take medicine with a small sip of water.  Children: * Infants, breast-fed: may have breast milk up to 2 hours before exam. * Infants, formula: may have bottle until 4 hours before exam. * Children 1-5 years: No food or drink for 4 hours before exam. * Children 6 -12 years: No food or drink for 6 hours before exam. * Children over 12 years: No food or drink for 8 hours before exam.  * J Tube Fed: No need to stop feedings.  What should I wear: Wear comfortable clothes.  How long does the exam take: Most ultrasounds take 30 to 60 minutes.  What should I bring: Bring a list of your medicines, including  vitamins, minerals and over-the-counter drugs. It is safest to leave personal items at home.  Do I need a :  No  is needed.  What do I need to tell my doctor: Tell your doctor about any allergies you may have.  What should I do after the exam: No restrictions, You may resume normal activities.  What is this test: An ultrasound uses sound waves to make pictures of the body. Sound waves do not cause pain. The only discomfort may be the pressure of the wand against your skin or full bladder.  Who should I call with questions: If you have any questions, please call the Imaging Department where you will have your exam. Directions, parking instructions, and other information is available on our website, [email or web address].              Future tests that were ordered for you today     Open Future Orders        Priority Expected Expires Ordered    US Aorta Medicare AAA Screening Routine  11/28/2019 11/28/2018            Who to contact     If you have questions or need follow up information about today's clinic visit or your schedule please contact [facility] directly at [phone].  Normal or non-critical lab and imaging results will be communicated to you by [email or web address], letter or phone within 4 business days after the clinic has received the results. If you do not hear from us within 7 days, please contact the clinic through Fyreballt or phone. If you have a critical or abnormal lab result, we will notify you by phone as soon as possible.  Submit refill requests through SkyeTek or call your pharmacy and they will forward the refill request to us. Please allow 3 business days for your refill to be completed.          Additional Information About Your Visit        SkyeTek Information     SkyeTek gives you secure access to your electronic health record. If you see a primary care provider, you can also send messages to your care team and make appointments. If you have questions, please call  "your primary care clinic.  If you do not have a primary care provider, please call [phone] and they will assist you.        Care EveryWhere ID     This is your Care EveryWhere ID. This could be used by other organizations to access your [place] medical records  [id number]        Your Vitals Were     Pulse Temperature Respirations Height BMI (Body Mass Index)       80 97.6  F (36.4  C) (Tympanic) 16 5' 6.25\" (1.683 m) 33.8 kg/m2        Blood Pressure from Last 3 Encounters:   11/28/18 134/86   06/21/18 132/84   06/07/18 134/86    Weight from Last 3 Encounters:   11/28/18 211 lb (95.7 kg)   06/21/18 203 lb (92.1 kg)   05/30/18 204 lb 9.6 oz (92.8 kg)              We Performed the Following     Abdominal Aortic Aneurysm Screening/Tracking     CBC with platelets differential     Comprehensive metabolic panel     Lipid Profile          Where to get your medicines      These medications were sent to [place] [address] [facility] - [place], [place] - [address]  [address] [facility] [address]     Phone:  [phone]     alfuzosin ER 10 MG 24 hr tablet    rosuvastatin 20 MG tablet          Primary Care Provider Office Phone # Fax #    Maurice BHAT [phone]       [address] [facility] [address]        Equal Access to Services     [facility] CenterHEIDY AH: Hadii aad ku hadasho Soomaali, waaxda luqadaha, qaybta kaalmada adeegyada, robert zapien. So [facility] [phone].    ATENCIÓN: Si habla español, tiene a harry disposición servicios gratuitos de asistencia lingüística. Llame al [phone].    We comply with applicable federal civil rights laws and [place] laws. We do not discriminate on the basis of race, color, national origin, age, disability, sex, sexual orientation, or gender identity.            Thank you!     Thank you for choosing [facility] AND hospitals  for your care. Our goal is always to provide you with excellent care. Hearing " back from our patients is one way we can continue to improve our services. Please take a few minutes to complete the written survey that you may receive in the mail after your visit with us. Thank you!             Your Updated Medication List - Protect others around you: Learn how to safely use, store and throw away your medicines at www.disposemymeds.org.          This list is accurate as of 11/28/18 12:36 PM.  Always use your most recent med list.                   Brand Name Dispense Instructions for use Diagnosis    alfuzosin ER 10 MG 24 hr tablet    UROXATRAL    90 tablet    Take 1 tablet (10 mg) by mouth daily with food    Malignant neoplasm of prostate (H)       rosuvastatin 20 MG tablet    CRESTOR    90 tablet    Take 1 tablet (20 mg) by mouth daily    Hyperlipidemia, unspecified hyperlipidemia type

## 2018-11-29 ENCOUNTER — ALLIED HEALTH/NURSE VISIT (OUTPATIENT)
Dept: FAMILY MEDICINE | Facility: OTHER | Age: 73
End: 2018-11-29
Attending: FAMILY MEDICINE
Payer: COMMERCIAL

## 2018-11-29 DIAGNOSIS — Z23 NEED FOR VACCINATION: Primary | ICD-10-CM

## 2018-11-29 DIAGNOSIS — Z23 NEED FOR PROPHYLACTIC VACCINATION AND INOCULATION AGAINST INFLUENZA: ICD-10-CM

## 2018-11-29 PROCEDURE — G0008 ADMIN INFLUENZA VIRUS VAC: HCPCS

## 2018-11-29 PROCEDURE — 90670 PCV13 VACCINE IM: CPT

## 2018-11-29 PROCEDURE — 90471 IMMUNIZATION ADMIN: CPT

## 2018-11-29 PROCEDURE — 90662 IIV NO PRSV INCREASED AG IM: CPT

## 2018-11-29 PROCEDURE — G0009 ADMIN PNEUMOCOCCAL VACCINE: HCPCS

## 2018-11-29 NOTE — MR AVS SNAPSHOT
After Visit Summary   11/29/2018    Heriberto Noriega    MRN: [id number]           Patient Information     Date Of Birth          1945        Visit Information        Provider Department      11/29/2018 12:45 PM GH INJECTION NURSE [facility]        Today's Diagnoses     Need for vaccination    -  1    Need for prophylactic vaccination and inoculation against influenza           Follow-ups after your visit        Your next 10 appointments already scheduled     Nov 29, 2018 12:45 PM CST   Nurse Only with GH INJECTION NURSE   [facility] ([facility])    [address]  [place] [address]   [phone]            Dec 04, 2018  8:30 AM CST   US AORTA MEDICARE AAA SCREENING with GHUS2   [facility] ([facility])    [address]  [place] [address]   [phone]           How do I prepare for my exam? (Food and drink instructions) Adults: No eating, smoking, gum chewing or drinking for 8 hours before the exam. You may take medicine with a small sip of water.  Children: * Infants, breast-fed: may have breast milk up to 2 hours before exam. * Infants, formula: may have bottle until 4 hours before exam. * Children 1-5 years: No food or drink for 4 hours before exam. * Children 6 -12 years: No food or drink for 6 hours before exam. * Children over 12 years: No food or drink for 8 hours before exam.  * J Tube Fed: No need to stop feedings.  What should I wear: Wear comfortable clothes.  How long does the exam take: Most ultrasounds take 30 to 60 minutes.  What should I bring: Bring a list of your medicines, including vitamins, minerals and over-the-counter drugs. It is safest to leave personal items at home.  Do I need a :  No  is needed.  What do I need to tell my doctor: Tell your doctor about any allergies you may have.  What should I do after the exam: No  restrictions, You may resume normal activities.  What is this test: An ultrasound uses sound waves to make pictures of the body. Sound waves do not cause pain. The only discomfort may be the pressure of the wand against your skin or full bladder.  Who should I call with questions: If you have any questions, please call the Imaging Department where you will have your exam. Directions, parking instructions, and other information is available on our website, [email or web address].              Future tests that were ordered for you today     Open Future Orders        Priority Expected Expires Ordered    US Aorta Medicare AAA Screening Routine  11/28/2019 11/28/2018            Who to contact     If you have questions or need follow up information about today's clinic visit or your schedule please contact [facility] directly at [phone].  Normal or non-critical lab and imaging results will be communicated to you by salgomedhart, letter or phone within 4 business days after the clinic has received the results. If you do not hear from us within 7 days, please contact the clinic through salgomedhart or phone. If you have a critical or abnormal lab result, we will notify you by phone as soon as possible.  Submit refill requests through Funanga or call your pharmacy and they will forward the refill request to us. Please allow 3 business days for your refill to be completed.          Additional Information About Your Visit        Funanga Information     Funanga gives you secure access to your electronic health record. If you see a primary care provider, you can also send messages to your care team and make appointments. If you have questions, please call your primary care clinic.  If you do not have a primary care provider, please call [phone] and they will assist you.        Care EveryWhere ID     This is your Care EveryWhere ID. This could be used by other organizations to access your [facility]  records  [id number]         Blood Pressure from Last 3 Encounters:   11/28/18 134/86   06/21/18 132/84   06/07/18 134/86    Weight from Last 3 Encounters:   11/28/18 211 lb (95.7 kg)   06/21/18 203 lb (92.1 kg)   05/30/18 204 lb 9.6 oz (92.8 kg)              We Performed the Following     HC FLU VACCINE, INCREASED ANTIGEN, PRESV FREE     Pneumococcal vaccine 13 valent PCV13 IM (Prevnar) [06413]     Vaccine Administration, Initial [92043]        Primary Care Provider Office Phone # Fax #    Maurice BHAT [phone] [address] [facility] [address]        Equal Access to Services     DESHAWN WARREN : Yary metzgero Soomaali, waaxda luqadaha, qaybta kaalmada adeegyajame, robert madden . So [facility] [phone].    ATENCIÓN: Si habla español, tiene a harry disposición servicios gratuitos de asistencia lingüística. Llame al [phone].    We comply with applicable federal civil rights laws and [place] laws. We do not discriminate on the basis of race, color, national origin, age, disability, sex, sexual orientation, or gender identity.            Thank you!     Thank you for choosing [facility]  for your care. Our goal is always to provide you with excellent care. Hearing back from our patients is one way we can continue to improve our services. Please take a few minutes to complete the written survey that you may receive in the mail after your visit with us. Thank you!             Your Updated Medication List - Protect others around you: Learn how to safely use, store and throw away your medicines at www.disposemymeds.org.          This list is accurate as of 11/29/18 12:42 PM.  Always use your most recent med list.                   Brand Name Dispense Instructions for use Diagnosis    alfuzosin ER 10 MG 24 hr tablet    UROXATRAL    90 tablet    Take 1 tablet (10 mg) by mouth daily with food    Malignant neoplasm of prostate (H)        rosuvastatin 20 MG tablet    CRESTOR    90 tablet    Take 1 tablet (20 mg) by mouth daily    Hyperlipidemia, unspecified hyperlipidemia type

## 2018-11-29 NOTE — PROGRESS NOTES
Injectable Influenza Immunization Documentation    1.  Is the person to be vaccinated sick today?   No    2. Does the person to be vaccinated have an allergy to a component   of the vaccine?   No  Egg Allergy Algorithm Link    3. Has the person to be vaccinated ever had a serious reaction   to influenza vaccine in the past?   No    4. Has the person to be vaccinated ever had Guillain-Barré syndrome?   No    Form completed by Pt denies allergies to yeast gelatin neosporin eggs thimerasol or latex or past reactions to vaccinations. Copy of MIIC given.  Nohelia Jeronimo RN on 11/29/2018 at 12:33 PM

## 2018-12-04 ENCOUNTER — HOSPITAL ENCOUNTER (OUTPATIENT)
Dept: ULTRASOUND IMAGING | Facility: OTHER | Age: 73
Discharge: HOME | End: 2018-12-04
Attending: FAMILY MEDICINE | Admitting: FAMILY MEDICINE
Payer: COMMERCIAL

## 2018-12-04 DIAGNOSIS — Z13.6 SCREENING FOR AAA (ABDOMINAL AORTIC ANEURYSM): ICD-10-CM

## 2018-12-04 DIAGNOSIS — Z87.891 HISTORY OF SMOKING: ICD-10-CM

## 2018-12-04 PROCEDURE — 76706 US ABDL AORTA SCREEN AAA: CPT

## 2019-08-06 ENCOUNTER — OFFICE VISIT (OUTPATIENT)
Dept: FAMILY MEDICINE | Facility: OTHER | Age: 74
End: 2019-08-06
Attending: FAMILY MEDICINE
Payer: COMMERCIAL

## 2019-08-06 VITALS
BODY MASS INDEX: 34.03 KG/M2 | DIASTOLIC BLOOD PRESSURE: 64 MMHG | TEMPERATURE: 96.8 F | SYSTOLIC BLOOD PRESSURE: 126 MMHG | HEIGHT: 67 IN | HEART RATE: 76 BPM | WEIGHT: 216.8 LBS | RESPIRATION RATE: 16 BRPM

## 2019-08-06 DIAGNOSIS — I25.10 CORONARY ARTERY CALCIFICATION SEEN ON CT SCAN: Primary | ICD-10-CM

## 2019-08-06 DIAGNOSIS — C61 MALIGNANT NEOPLASM OF PROSTATE (H): ICD-10-CM

## 2019-08-06 DIAGNOSIS — T75.3XXD MOTION SICKNESS, SUBSEQUENT ENCOUNTER: ICD-10-CM

## 2019-08-06 DIAGNOSIS — E78.5 HYPERLIPIDEMIA, UNSPECIFIED HYPERLIPIDEMIA TYPE: ICD-10-CM

## 2019-08-06 LAB
ALBUMIN SERPL-MCNC: 4.1 G/DL (ref 3.5–5.7)
ALP SERPL-CCNC: 106 U/L (ref 34–104)
ALT SERPL W P-5'-P-CCNC: 81 U/L (ref 7–52)
ANION GAP SERPL CALCULATED.3IONS-SCNC: 6 MMOL/L (ref 3–14)
AST SERPL W P-5'-P-CCNC: 51 U/L (ref 13–39)
BASOPHILS # BLD AUTO: 0.1 10E9/L (ref 0–0.2)
BASOPHILS NFR BLD AUTO: 1.3 %
BILIRUB SERPL-MCNC: 0.8 MG/DL (ref 0.3–1)
BUN SERPL-MCNC: 15 MG/DL (ref 7–25)
CALCIUM SERPL-MCNC: 9.6 MG/DL (ref 8.6–10.3)
CHLORIDE SERPL-SCNC: 104 MMOL/L (ref 98–107)
CHOLEST SERPL-MCNC: 106 MG/DL
CO2 SERPL-SCNC: 25 MMOL/L (ref 21–31)
CREAT SERPL-MCNC: 0.74 MG/DL (ref 0.7–1.3)
DIFFERENTIAL METHOD BLD: ABNORMAL
EOSINOPHIL # BLD AUTO: 0.3 10E9/L (ref 0–0.7)
EOSINOPHIL NFR BLD AUTO: 5.3 %
ERYTHROCYTE [DISTWIDTH] IN BLOOD BY AUTOMATED COUNT: 12.9 % (ref 10–15)
GFR SERPL CREATININE-BSD FRML MDRD: >90 ML/MIN/{1.73_M2}
GLUCOSE SERPL-MCNC: 132 MG/DL (ref 70–105)
HCT VFR BLD AUTO: 51.2 % (ref 40–53)
HDLC SERPL-MCNC: 28 MG/DL (ref 23–92)
HGB BLD-MCNC: 17.6 G/DL (ref 13.3–17.7)
IMM GRANULOCYTES # BLD: 0 10E9/L (ref 0–0.4)
IMM GRANULOCYTES NFR BLD: 0.4 %
LDLC SERPL CALC-MCNC: 50 MG/DL
LYMPHOCYTES # BLD AUTO: 1.1 10E9/L (ref 0.8–5.3)
LYMPHOCYTES NFR BLD AUTO: 23 %
MCH RBC QN AUTO: 31.9 PG (ref 26.5–33)
MCHC RBC AUTO-ENTMCNC: 34.4 G/DL (ref 31.5–36.5)
MCV RBC AUTO: 93 FL (ref 78–100)
MONOCYTES # BLD AUTO: 0.5 10E9/L (ref 0–1.3)
MONOCYTES NFR BLD AUTO: 9.5 %
NEUTROPHILS # BLD AUTO: 2.9 10E9/L (ref 1.6–8.3)
NEUTROPHILS NFR BLD AUTO: 60.5 %
NONHDLC SERPL-MCNC: 78 MG/DL
PLATELET # BLD AUTO: 122 10E9/L (ref 150–450)
POTASSIUM SERPL-SCNC: 4 MMOL/L (ref 3.5–5.1)
PROT SERPL-MCNC: 7 G/DL (ref 6.4–8.9)
PSA SERPL-MCNC: 0.03 NG/ML
RBC # BLD AUTO: 5.52 10E12/L (ref 4.4–5.9)
SODIUM SERPL-SCNC: 135 MMOL/L (ref 134–144)
TRIGL SERPL-MCNC: 138 MG/DL
WBC # BLD AUTO: 4.7 10E9/L (ref 4–11)

## 2019-08-06 PROCEDURE — 85025 COMPLETE CBC W/AUTO DIFF WBC: CPT | Mod: ZL | Performed by: FAMILY MEDICINE

## 2019-08-06 PROCEDURE — 99214 OFFICE O/P EST MOD 30 MIN: CPT | Performed by: FAMILY MEDICINE

## 2019-08-06 PROCEDURE — 80053 COMPREHEN METABOLIC PANEL: CPT | Mod: ZL | Performed by: FAMILY MEDICINE

## 2019-08-06 PROCEDURE — G0463 HOSPITAL OUTPT CLINIC VISIT: HCPCS

## 2019-08-06 PROCEDURE — 36415 COLL VENOUS BLD VENIPUNCTURE: CPT | Mod: ZL | Performed by: FAMILY MEDICINE

## 2019-08-06 PROCEDURE — 84153 ASSAY OF PSA TOTAL: CPT | Mod: ZL | Performed by: FAMILY MEDICINE

## 2019-08-06 PROCEDURE — 80061 LIPID PANEL: CPT | Mod: ZL | Performed by: FAMILY MEDICINE

## 2019-08-06 RX ORDER — MECLIZINE HCL 12.5 MG 12.5 MG/1
12.5 TABLET ORAL 3 TIMES DAILY PRN
Qty: 30 TABLET | Refills: 11 | Status: ON HOLD | OUTPATIENT
Start: 2019-08-06 | End: 2024-01-01

## 2019-08-06 RX ORDER — UBIDECARENONE 50 MG
100 CAPSULE ORAL DAILY
COMMUNITY

## 2019-08-06 RX ORDER — ROSUVASTATIN CALCIUM 20 MG/1
20 TABLET, COATED ORAL DAILY
Qty: 90 TABLET | Refills: 3 | Status: SHIPPED | OUTPATIENT
Start: 2019-08-06 | End: 2020-07-01

## 2019-08-06 RX ORDER — ALFUZOSIN HYDROCHLORIDE 10 MG/1
10 TABLET, EXTENDED RELEASE ORAL
Qty: 90 TABLET | Refills: 3 | Status: SHIPPED | OUTPATIENT
Start: 2019-08-06 | End: 2020-07-01

## 2019-08-06 ASSESSMENT — PAIN SCALES - GENERAL: PAINLEVEL: NO PAIN (0)

## 2019-08-06 ASSESSMENT — MIFFLIN-ST. JEOR: SCORE: 1677.03

## 2019-08-06 NOTE — PROGRESS NOTES
"Nursing Notes:   Mirta Lares LPN  2019  8:50 AM  Signed  Chief Complaint   Patient presents with     Recheck Medication     Patient presents to theclinic for annual review of medications and chronic problems.      Initial /64 (BP Location: Right arm, Patient Position: Sitting, Cuff Size: Adult Regular)   Pulse 76   Temp 96.8  F (36  C) (Tympanic)   Resp 16   Ht 1.702 m (5' 7\")   Wt 98.3 kg (216 lb 12.8 oz)   BMI 33.96 kg/m    Estimated body mass index is 33.96 kg/m  as calculated from the following:    Height as of this encounter: 1.702 m (5' 7\").    Weight as of this encounter: 98.3 kg (216 lb 12.8 oz).  Medication Reconciliation: Completed     Mirta Lares LPN    SUBJECTIVE:  Heriberto Noriega  is a 74 year old male who comes in today for complete evaluation and renewal of medications.  We did this last year in October and November.  He just saw the cardiologist in .  He has a coronary calcium score 1500 but had a normal stress echo.  He continues on Crestor 20 mg daily.    He had previous prostate cancer treatment.  PSA done in 2018 was 0.045.  He has had 1 dose of Shingrix and is otherwise up-to-date on health maintenance issues and immunizations.    His wife Jennifer fell in the driveway 6 weeks ago.  She had a right humerus fracture. She is doing better.     He had been walking and now is resuming.     Past Medical, Family, and Social History reviewed and updated as noted below.   ROS is negative except as noted above       Allergies   Allergen Reactions     Aspirin Hives   ,   Family History   Problem Relation Age of Onset     Heart Disease Father          from CHF     Breast Cancer Mother          from Breast Cancer     Other - See Comments Brother         Agent orange exposure     Breast Cancer Sister      Heart Disease Brother         Heart Disease,cardiac surgery, CABG     Other - See Comments Brother          polio     Cancer Brother         Cancer, from " lung cancer   ,   Current Outpatient Medications   Medication     alfuzosin ER (UROXATRAL) 10 MG 24 hr tablet     co-enzyme Q-10 50 MG CAPS     meclizine (ANTIVERT) 12.5 MG tablet     rosuvastatin (CRESTOR) 20 MG tablet     No current facility-administered medications for this visit.    ,   Past Medical History:   Diagnosis Date     Cholelithiasis      Coronary artery calcification      Dietary counseling and surveillance     Low carbohydrate diet.     Dorsalgia     12/13/2005,with radicular symptoms     Enlarged prostate without lower urinary tract symptoms (luts)     Benign with mild urinary obstruction symptoms.     Hyperlipidemia      Malignant neoplasm of prostate (H)     11/21/2011,He completed radiation treatment for prostate cancer on August 17 of 2012.  Initially had Radioactive palladium seed implants done followed by external beam radiation     Secondary polycythemia     3/29/2017   ,   Patient Active Problem List    Diagnosis Date Noted     Coronary artery calcification seen on CT scan, Score 1504 06/07/2018     Priority: Medium     Chest pain 05/24/2018     Priority: Medium     Obesity 02/02/2018     Priority: Medium     Polycythemia 03/29/2017     Priority: Medium     Counseling regarding advanced directives and goals of care 07/10/2015     Priority: Medium     Overview:   Wants only comfort cares if not cognitively intact. Does not want to go to skilled nursing facility.       ACP (advance care planning) 12/10/2013     Priority: Medium     Malignant neoplasm of prostate (H) 11/21/2011     Priority: Medium     Overview:   Completed radiation therapy in August of 2012 at [facility]       Hyperlipidemia 10/04/2011     Priority: Medium     Hearing loss, sensorineural 11/10/2009     Priority: Medium     Osteoarthrosis 11/10/2009     Priority: Medium     Seborrheic keratosis 11/10/2009     Priority: Medium     Tinnitus 11/10/2009     Priority: Medium   ,   Past Surgical History:   Procedure  "Laterality Date     COLONOSCOPY       Hyperplastic polyp at 20 cm.     COLONOSCOPY      05,next colonoscopy due in      COLONOSCOPY  2015    Follow up 10 years 2025, hyperplastic     PROSTATE SURGERY      Seeds and radiation    and   Social History     Tobacco Use     Smoking status: Former Smoker     Types: Cigarettes     Last attempt to quit: 1979     Years since quittin.6     Smokeless tobacco: Never Used   Substance Use Topics     Alcohol use: No     OBJECTIVE:  /64 (BP Location: Right arm, Patient Position: Sitting, Cuff Size: Adult Regular)   Pulse 76   Temp 96.8  F (36  C) (Tympanic)   Resp 16   Ht 1.702 m (5' 7\")   Wt 98.3 kg (216 lb 12.8 oz)   BMI 33.96 kg/m     EXAM:  General Appearance: Pleasant, alert, appropriate appearance for age. No acute distress  Head Exam: Normal. Normocephalic, atraumatic.  Eye Exam:  Normal external eyes, conjunctivae, lids, cornea. LAURITA. EOMI  Ear Exam: Normal TM's bilaterally. Normal auditory canals and external ears. Non-tender.  Nose Exam: Normal external nose, mucus membranes, and septum.  OroPharynx Exam:  Dental hygiene adequate. Normal buccal mucosa. Normal pharynx.  Neck Exam:  Supple, no masses or nodes. No audible bruits  Thyroid Exam: No nodules or enlargement.  Chest/Respiratory Exam: Normal chest wall and respirations. Clear to auscultation.  Cardiovascular Exam: Regular rate and rhythm. S1, S2, no murmur, click, gallop, or rubs.  Gastrointestinal Exam: Soft, non-tender, no masses or organomegaly.  Lymphatic Exam: Non-palpable nodes in neck, clavicular regions.  Musculoskeletal Exam: Back is straight and non-tender, full ROM of upper and lower extremities.  Foot Exam: Left and right foot: good pedal pulses  Skin: no rash or abnormalities  Neurologic Exam: Nonfocal, normal gross motor, tone coordination and no tremor.  Psychiatric Exam: Alert and oriented - appropriate affect.     Results for orders placed or performed in " visit on 08/06/19   Prostate Specific Antigen GH   Result Value Ref Range    Prostate Specific Antigen 0.029 <3.100 ng/mL   Comprehensive metabolic panel   Result Value Ref Range    Sodium 135 134 - 144 mmol/L    Potassium 4.0 3.5 - 5.1 mmol/L    Chloride 104 98 - 107 mmol/L    Carbon Dioxide 25 21 - 31 mmol/L    Anion Gap 6 3 - 14 mmol/L    Glucose 132 (H) 70 - 105 mg/dL    Urea Nitrogen 15 7 - 25 mg/dL    Creatinine 0.74 0.70 - 1.30 mg/dL    GFR Estimate >90 >60 mL/min/[1.73_m2]    GFR Estimate If Black >90 >60 mL/min/[1.73_m2]    Calcium 9.6 8.6 - 10.3 mg/dL    Bilirubin Total 0.8 0.3 - 1.0 mg/dL    Albumin 4.1 3.5 - 5.7 g/dL    Protein Total 7.0 6.4 - 8.9 g/dL    Alkaline Phosphatase 106 (H) 34 - 104 U/L    ALT 81 (H) 7 - 52 U/L    AST 51 (H) 13 - 39 U/L   Lipid Profile   Result Value Ref Range    Cholesterol 106 <200 mg/dL    Triglycerides 138 <150 mg/dL    HDL Cholesterol 28 23 - 92 mg/dL    LDL Cholesterol Calculated 50 <100 mg/dL    Non HDL Cholesterol 78 <130 mg/dL   CBC with platelets differential   Result Value Ref Range    WBC 4.7 4.0 - 11.0 10e9/L    RBC Count 5.52 4.4 - 5.9 10e12/L    Hemoglobin 17.6 13.3 - 17.7 g/dL    Hematocrit 51.2 40.0 - 53.0 %    MCV 93 78 - 100 fl    MCH 31.9 26.5 - 33.0 pg    MCHC 34.4 31.5 - 36.5 g/dL    RDW 12.9 10.0 - 15.0 %    Platelet Count 122 (L) 150 - 450 10e9/L    Diff Method Automated Method     % Neutrophils 60.5 %    % Lymphocytes 23.0 %    % Monocytes 9.5 %    % Eosinophils 5.3 %    % Basophils 1.3 %    % Immature Granulocytes 0.4 %    Absolute Neutrophil 2.9 1.6 - 8.3 10e9/L    Absolute Lymphocytes 1.1 0.8 - 5.3 10e9/L    Absolute Monocytes 0.5 0.0 - 1.3 10e9/L    Absolute Eosinophils 0.3 0.0 - 0.7 10e9/L    Absolute Basophils 0.1 0.0 - 0.2 10e9/L    Abs Immature Granulocytes 0.0 0 - 0.4 10e9/L      ASSESSMENT/Plan :    Heriberto was seen today for recheck medication.    Diagnoses and all orders for this visit:    Coronary artery calcification seen on CT scan, Score  1504  -     CBC with platelets differential; Future  -     Lipid Profile; Future  -     Comprehensive metabolic panel; Future  -     Comprehensive metabolic panel  -     Lipid Profile  -     CBC with platelets differential    Hyperlipidemia, unspecified hyperlipidemia type  -     CBC with platelets differential; Future  -     Lipid Profile; Future  -     Comprehensive metabolic panel; Future  -     Comprehensive metabolic panel  -     Lipid Profile  -     CBC with platelets differential  -     rosuvastatin (CRESTOR) 20 MG tablet; Take 1 tablet (20 mg) by mouth daily    Malignant neoplasm of prostate (H)  -     Prostate Specific Antigen GH; Future  -     Prostate Specific Antigen GH  -     alfuzosin ER (UROXATRAL) 10 MG 24 hr tablet; Take 1 tablet (10 mg) by mouth daily with food    Motion sickness, subsequent encounter  -     meclizine (ANTIVERT) 12.5 MG tablet; Take 1 tablet (12.5 mg) by mouth 3 times daily as needed for dizziness      Will notify of lab results when available. Discussed diet, exercise and healthy lifestyle changes. Continue current medications.     A total of 25 minutes was spent with the patient, greater than 50% of the time was spent in counseling/discussion of the aforementioned concerns.     Maurice Agustin MD

## 2019-08-06 NOTE — NURSING NOTE
"Chief Complaint   Patient presents with     Recheck Medication     Patient presents to theclinic for annual review of medications and chronic problems.      Initial /64 (BP Location: Right arm, Patient Position: Sitting, Cuff Size: Adult Regular)   Pulse 76   Temp 96.8  F (36  C) (Tympanic)   Resp 16   Ht 1.702 m (5' 7\")   Wt 98.3 kg (216 lb 12.8 oz)   BMI 33.96 kg/m   Estimated body mass index is 33.96 kg/m  as calculated from the following:    Height as of this encounter: 1.702 m (5' 7\").    Weight as of this encounter: 98.3 kg (216 lb 12.8 oz).  Medication Reconciliation: Completed     Mirta Lares LPN  "

## 2019-08-12 ENCOUNTER — TRANSFERRED RECORDS (OUTPATIENT)
Dept: HEALTH INFORMATION MANAGEMENT | Facility: CLINIC | Age: 74
End: 2019-08-12

## 2019-09-12 ENCOUNTER — TELEPHONE (OUTPATIENT)
Dept: FAMILY MEDICINE | Facility: OTHER | Age: 74
End: 2019-09-12

## 2019-09-12 NOTE — TELEPHONE ENCOUNTER
The patient was given an appointment September 16 th with Maurice Agustin MD.  Leanne Hargrove LPN..................9/12/2019   3:03 PM

## 2019-09-12 NOTE — TELEPHONE ENCOUNTER
Patient called in regards to getting an appointment for elbow pain he states he can hear cracking in it and is unsure if it is dislocated. Would like to talk to carmella. Please call him back in regards to this. Thank you.

## 2019-09-16 ENCOUNTER — OFFICE VISIT (OUTPATIENT)
Dept: FAMILY MEDICINE | Facility: OTHER | Age: 74
End: 2019-09-16
Attending: FAMILY MEDICINE
Payer: COMMERCIAL

## 2019-09-16 VITALS
RESPIRATION RATE: 16 BRPM | HEIGHT: 67 IN | SYSTOLIC BLOOD PRESSURE: 132 MMHG | HEART RATE: 74 BPM | OXYGEN SATURATION: 91 % | WEIGHT: 214.4 LBS | TEMPERATURE: 97.4 F | DIASTOLIC BLOOD PRESSURE: 82 MMHG | BODY MASS INDEX: 33.65 KG/M2

## 2019-09-16 DIAGNOSIS — M77.11 LATERAL EPICONDYLITIS, RIGHT ELBOW: ICD-10-CM

## 2019-09-16 DIAGNOSIS — R40.0 DAYTIME SOMNOLENCE: Primary | ICD-10-CM

## 2019-09-16 PROCEDURE — G0463 HOSPITAL OUTPT CLINIC VISIT: HCPCS

## 2019-09-16 PROCEDURE — 99213 OFFICE O/P EST LOW 20 MIN: CPT | Performed by: FAMILY MEDICINE

## 2019-09-16 SDOH — HEALTH STABILITY: MENTAL HEALTH: HOW OFTEN DO YOU HAVE A DRINK CONTAINING ALCOHOL?: NOT ASKED

## 2019-09-16 ASSESSMENT — MIFFLIN-ST. JEOR: SCORE: 1661.88

## 2019-09-16 ASSESSMENT — PAIN SCALES - GENERAL: PAINLEVEL: NO PAIN (0)

## 2019-09-16 NOTE — NURSING NOTE
"Patient presents to clinic for right elbow pain and discuss CPAP.   Chief Complaint   Patient presents with     Elbow Pain     discuss CPAP       Initial /82   Pulse 74   Temp 97.4  F (36.3  C) (Tympanic)   Resp 16   Ht 1.695 m (5' 6.73\")   Wt 97.3 kg (214 lb 6.4 oz)   SpO2 91%   BMI 33.85 kg/m   Estimated body mass index is 33.85 kg/m  as calculated from the following:    Height as of this encounter: 1.695 m (5' 6.73\").    Weight as of this encounter: 97.3 kg (214 lb 6.4 oz).  Medication Reconciliation: complete    Brigitte Mishra LPN    "

## 2019-09-16 NOTE — PROGRESS NOTES
"Nursing Notes:   Brigitte Msihra LPN  2019 11:05 AM  Signed  Patient presents to clinic for right elbow pain and discuss CPAP.   Chief Complaint   Patient presents with     Elbow Pain     discuss CPAP       Initial /82   Pulse 74   Temp 97.4  F (36.3  C) (Tympanic)   Resp 16   Ht 1.695 m (5' 6.73\")   Wt 97.3 kg (214 lb 6.4 oz)   SpO2 91%   BMI 33.85 kg/m    Estimated body mass index is 33.85 kg/m  as calculated from the following:    Height as of this encounter: 1.695 m (5' 6.73\").    Weight as of this encounter: 97.3 kg (214 lb 6.4 oz).  Medication Reconciliation: complete    Brigitte Mishra LPN    SUBJECTIVE:  Heriberto Noriega  is a 74 year old male who comes in today for a couple of issues.  He is having trouble with right elbow pain. It has bothered since he cut up a tree that a [place] downed.  He used the chainsaw. He has clicking.  It is getting better but he wanted to know what he should do about it.    He also would like to discuss CPAP.  He does snore.  Wakes up often with a dry mouth and gets up several times at night.  Not sure if he stops breathing or not.    Past Medical, Family, and Social History reviewed and updated as noted below.   ROS is negative except as noted above       Allergies   Allergen Reactions     Aspirin Hives   ,   Family History   Problem Relation Age of Onset     Heart Disease Father          from CHF     Breast Cancer Mother          from Breast Cancer     Other - See Comments Brother         Agent orange exposure     Breast Cancer Sister      Heart Disease Brother         Heart Disease,cardiac surgery, CABG     Other - See Comments Brother          polio     Cancer Brother         Cancer, from lung cancer   ,   Current Outpatient Medications   Medication     alfuzosin ER (UROXATRAL) 10 MG 24 hr tablet     co-enzyme Q-10 50 MG CAPS     meclizine (ANTIVERT) 12.5 MG tablet     rosuvastatin (CRESTOR) 20 MG tablet     No current facility-administered " medications for this visit.    ,   Past Medical History:   Diagnosis Date     Cholelithiasis      Coronary artery calcification      Dietary counseling and surveillance     Low carbohydrate diet.     Dorsalgia     12/13/2005,with radicular symptoms     Enlarged prostate without lower urinary tract symptoms (luts)     Benign with mild urinary obstruction symptoms.     Hyperlipidemia      Malignant neoplasm of prostate (H)     11/21/2011,He completed radiation treatment for prostate cancer on August 17 of 2012.  Initially had Radioactive palladium seed implants done followed by external beam radiation     Secondary polycythemia     3/29/2017   ,   Patient Active Problem List    Diagnosis Date Noted     Coronary artery calcification seen on CT scan, Score 1504 06/07/2018     Priority: Medium     Chest pain 05/24/2018     Priority: Medium     Obesity 02/02/2018     Priority: Medium     Polycythemia 03/29/2017     Priority: Medium     Counseling regarding advanced directives and goals of care 07/10/2015     Priority: Medium     Overview:   Wants only comfort cares if not cognitively intact. Does not want to go to skilled nursing facility.       ACP (advance care planning) 12/10/2013     Priority: Medium     Malignant neoplasm of prostate (H) 11/21/2011     Priority: Medium     Overview:   Completed radiation therapy in August of 2012 at [facility]       Hyperlipidemia 10/04/2011     Priority: Medium     Hearing loss, sensorineural 11/10/2009     Priority: Medium     Osteoarthrosis 11/10/2009     Priority: Medium     Seborrheic keratosis 11/10/2009     Priority: Medium     Tinnitus 11/10/2009     Priority: Medium   ,   Past Surgical History:   Procedure Laterality Date     COLONOSCOPY       Hyperplastic polyp at 20 cm.     COLONOSCOPY      5/16/05,next colonoscopy due in 2015     COLONOSCOPY  06/22/2015    Follow up 10 years 06/22/2025, hyperplastic     PROSTATE SURGERY      Seeds and radiation    and   Social  "History     Tobacco Use     Smoking status: Former Smoker     Types: Cigarettes     Last attempt to quit: 1979     Years since quittin.7     Smokeless tobacco: Never Used   Substance Use Topics     Alcohol use: Yes     Comment: very rare     OBJECTIVE:  /82   Pulse 74   Temp 97.4  F (36.3  C) (Tympanic)   Resp 16   Ht 1.695 m (5' 6.73\")   Wt 97.3 kg (214 lb 6.4 oz)   SpO2 91%   BMI 33.85 kg/m     EXAM:  Alert and cooperative, no distress.  Examination of his right elbow reveals some minimal tenderness over the lateral epicondyle.  Positive teacup test is noted and some pain with pronation and supination.  ASSESSMENT/Plan :    Heriberto was seen today for elbow pain.    Diagnoses and all orders for this visit:    Daytime somnolence  -     SLEEP EVALUATION & MANAGEMENT REFERRAL - [facility] [phone] (Age 13 and up); Future    Lateral epicondylitis, right elbow      Discussed ice, ice massage, stretches, instructed on same.  Scheduled dose ibuprofen for 5 to 7 days.  Discussed rest and tennis elbow band.  If not improving, would then refer for occupational therapy and/or consider injection.    Discussion with regard to daytime somnolence.  Often will find himself falling asleep in a chair.  Interested in sleep evaluation.  Referred to Dr. Tran for consultation.    A total of 15 minutes was spent with the patient, greater than 50% of the time was spent in counseling/discussion of the aforementioned concerns.     Maurice Agustin MD            "

## 2019-10-17 ENCOUNTER — TRANSFERRED RECORDS (OUTPATIENT)
Dept: HEALTH INFORMATION MANAGEMENT | Facility: OTHER | Age: 74
End: 2019-10-17

## 2019-10-22 ENCOUNTER — TRANSFERRED RECORDS (OUTPATIENT)
Dept: HEALTH INFORMATION MANAGEMENT | Facility: OTHER | Age: 74
End: 2019-10-22

## 2019-10-25 ENCOUNTER — ALLIED HEALTH/NURSE VISIT (OUTPATIENT)
Dept: FAMILY MEDICINE | Facility: OTHER | Age: 74
End: 2019-10-25
Attending: FAMILY MEDICINE
Payer: COMMERCIAL

## 2019-10-25 DIAGNOSIS — Z23 NEED FOR PROPHYLACTIC VACCINATION AND INOCULATION AGAINST INFLUENZA: Primary | ICD-10-CM

## 2019-10-25 PROCEDURE — 90662 IIV NO PRSV INCREASED AG IM: CPT

## 2019-10-25 PROCEDURE — 90471 IMMUNIZATION ADMIN: CPT

## 2019-12-11 ENCOUNTER — TRANSFERRED RECORDS (OUTPATIENT)
Dept: HEALTH INFORMATION MANAGEMENT | Facility: OTHER | Age: 74
End: 2019-12-11

## 2020-01-07 DIAGNOSIS — C61 MALIGNANT NEOPLASM OF PROSTATE (H): ICD-10-CM

## 2020-01-08 RX ORDER — ALFUZOSIN HYDROCHLORIDE 10 MG/1
10 TABLET, EXTENDED RELEASE ORAL
Qty: 90 TABLET | Refills: 3 | OUTPATIENT
Start: 2020-01-08

## 2020-01-08 NOTE — TELEPHONE ENCOUNTER
"Requested Prescriptions   Pending Prescriptions Disp Refills     alfuzosin ER (UROXATRAL) 10 MG 24 hr tablet 90 tablet 3     Sig: Take 1 tablet (10 mg) by mouth daily with food       Alpha Blockers Passed - 1/7/2020 12:29 PM        Passed - Blood pressure under 140/90 in past 12 months     BP Readings from Last 3 Encounters:   09/16/19 132/82   08/06/19 126/64   11/28/18 134/86                 Passed - Recent (12 mo) or future (30 days) visit within the authorizing provider's specialty     Patient has had an office visit with the authorizing provider or a provider within the authorizing providers department within the previous 12 mos or has a future within next 30 days. See \"Patient Info\" tab in inbasket, or \"Choose Columns\" in Meds & Orders section of the refill encounter.              Passed - Patient does not have Tadalafil, Vardenafil, or Sildenafil on their medication list        Passed - Medication is active on med list        Passed - Patient is 18 years of age or older        lov 09/16/19  Refilled on 08/06/19 for #90 with 3 refills. One year.    "

## 2020-01-14 ENCOUNTER — MYC MEDICAL ADVICE (OUTPATIENT)
Dept: FAMILY MEDICINE | Facility: OTHER | Age: 75
End: 2020-01-14

## 2020-01-14 DIAGNOSIS — T75.3XXD MOTION SICKNESS, SUBSEQUENT ENCOUNTER: Primary | ICD-10-CM

## 2020-01-14 RX ORDER — SCOLOPAMINE TRANSDERMAL SYSTEM 1 MG/1
1 PATCH, EXTENDED RELEASE TRANSDERMAL
Qty: 10 PATCH | Refills: 3 | Status: ON HOLD | OUTPATIENT
Start: 2020-01-14 | End: 2024-01-01

## 2020-03-01 ENCOUNTER — HEALTH MAINTENANCE LETTER (OUTPATIENT)
Age: 75
End: 2020-03-01

## 2020-03-27 ENCOUNTER — MYC MEDICAL ADVICE (OUTPATIENT)
Dept: FAMILY MEDICINE | Facility: OTHER | Age: 75
End: 2020-03-27

## 2020-07-01 ENCOUNTER — OFFICE VISIT (OUTPATIENT)
Dept: FAMILY MEDICINE | Facility: OTHER | Age: 75
End: 2020-07-01
Attending: FAMILY MEDICINE
Payer: COMMERCIAL

## 2020-07-01 VITALS
OXYGEN SATURATION: 97 % | BODY MASS INDEX: 28.73 KG/M2 | DIASTOLIC BLOOD PRESSURE: 84 MMHG | TEMPERATURE: 97.2 F | WEIGHT: 182 LBS | HEART RATE: 68 BPM | SYSTOLIC BLOOD PRESSURE: 126 MMHG | RESPIRATION RATE: 16 BRPM

## 2020-07-01 DIAGNOSIS — I77.810 DILATATION OF THORACIC AORTA (H): ICD-10-CM

## 2020-07-01 DIAGNOSIS — D75.1 POLYCYTHEMIA: ICD-10-CM

## 2020-07-01 DIAGNOSIS — I25.10 CORONARY ARTERY CALCIFICATION SEEN ON CT SCAN: ICD-10-CM

## 2020-07-01 DIAGNOSIS — H90.3 SENSORINEURAL HEARING LOSS (SNHL) OF BOTH EARS: ICD-10-CM

## 2020-07-01 DIAGNOSIS — Z00.00 ENCOUNTER FOR MEDICARE ANNUAL WELLNESS EXAM: Primary | ICD-10-CM

## 2020-07-01 DIAGNOSIS — E78.5 HYPERLIPIDEMIA, UNSPECIFIED HYPERLIPIDEMIA TYPE: ICD-10-CM

## 2020-07-01 DIAGNOSIS — C61 MALIGNANT NEOPLASM OF PROSTATE (H): ICD-10-CM

## 2020-07-01 PROCEDURE — 99214 OFFICE O/P EST MOD 30 MIN: CPT | Mod: 25 | Performed by: FAMILY MEDICINE

## 2020-07-01 PROCEDURE — G0439 PPPS, SUBSEQ VISIT: HCPCS | Performed by: FAMILY MEDICINE

## 2020-07-01 PROCEDURE — G0463 HOSPITAL OUTPT CLINIC VISIT: HCPCS

## 2020-07-01 RX ORDER — ROSUVASTATIN CALCIUM 20 MG/1
20 TABLET, COATED ORAL DAILY
Qty: 90 TABLET | Refills: 4 | Status: SHIPPED | OUTPATIENT
Start: 2020-07-01 | End: 2021-08-17

## 2020-07-01 RX ORDER — ALFUZOSIN HYDROCHLORIDE 10 MG/1
10 TABLET, EXTENDED RELEASE ORAL
Qty: 90 TABLET | Refills: 4 | Status: SHIPPED | OUTPATIENT
Start: 2020-07-01 | End: 2021-07-26

## 2020-07-01 RX ORDER — LACTOBACILLUS COMBINATION NO.4 3B CELL
CAPSULE ORAL
COMMUNITY
Start: 2019-10-17 | End: 2023-01-01

## 2020-07-01 ASSESSMENT — PAIN SCALES - GENERAL: PAINLEVEL: NO PAIN (0)

## 2020-07-01 NOTE — PROGRESS NOTES
"Nursing Notes:   Leanne Hagrrove LPN  2020  8:29 AM  Signed  Chief Complaint   Patient presents with     Medicare Visit     annual       Initial /84   Pulse 68   Temp 97.2  F (36.2  C) (Temporal)   Resp 16   Wt 82.6 kg (182 lb)   SpO2 97%   BMI 28.73 kg/m   Estimated body mass index is 28.73 kg/m  as calculated from the following:    Height as of 19: 1.695 m (5' 6.73\").    Weight as of this encounter: 82.6 kg (182 lb).  Medication Reconciliation: complete    Leanne Hargrove LPN    SUBJECTIVE:  Heriberto Noriega  is a 74 year old male who comes in today for Medicare wellness and renewal of medications and review of medical problems.    He is on CPAP for obstructive sleep apnea.  He last saw Dr. Tran in 2019.  He uses his CPAP faithfully and finds that his sleep quality is better and it is quite helpful.    He has a dilated thoracic aorta for which he follows with cardiology at [facility].  He had echocardiogram yesterday.  Ascending aorta is dilated with a maximal diameter of 4 cm.  He has normal LV size and function with normal valves.  He has significant coronary calcium and 2 years ago had a stress test that was negative.  He follows with Dr. Prado.  He continues on Crestor 20 mg daily.    He previously had prostate cancer treatment. His PSA a year ago was 0.029.    He has stable polycythemia.  He has sensorineural hearing loss.    He is up-to-date on immunizations and health maintenance issues.    He has been watching carbs and walking every day. He has lost weight.    Past Medical, Family, and Social History reviewed and updated as noted below.   ROS is negative except as noted above       Allergies   Allergen Reactions     Aspirin Hives   ,   Family History   Problem Relation Age of Onset     Heart Disease Father          from CHF     Breast Cancer Mother          from Breast Cancer     Other - See Comments Brother         Agent orange exposure     Breast " Cancer Sister      Heart Disease Brother         Heart Disease,cardiac surgery, CABG     Other - See Comments Brother          polio     Cancer Brother         Cancer, from lung cancer   ,   Current Outpatient Medications   Medication     alfuzosin ER (UROXATRAL) 10 MG 24 hr tablet     co-enzyme Q-10 50 MG CAPS     Probiotic Product ([employer] PROBIOTIC) CAPS     rosuvastatin (CRESTOR) 20 MG tablet     meclizine (ANTIVERT) 12.5 MG tablet     scopolamine (TRANSDERM) 1 MG/3DAYS 72 hr patch     No current facility-administered medications for this visit.    ,   Past Medical History:   Diagnosis Date     Cholelithiasis      Coronary artery calcification      Dietary counseling and surveillance     Low carbohydrate diet.     Dorsalgia     2005,with radicular symptoms     Enlarged prostate without lower urinary tract symptoms (luts)     Benign with mild urinary obstruction symptoms.     Hyperlipidemia      Malignant neoplasm of prostate (H)     2011,He completed radiation treatment for prostate cancer on .  Initially had Radioactive palladium seed implants done followed by external beam radiation     Secondary polycythemia     3/29/2017   ,   Patient Active Problem List    Diagnosis Date Noted     Dilatation of thoracic aorta, 4 cm () 2020     Priority: Medium     Coronary artery calcification seen on CT scan, Score 1504 2018     Priority: Medium     Chest pain 2018     Priority: Medium     Obesity 2018     Priority: Medium     Polycythemia 2017     Priority: Medium     Counseling regarding advanced directives and goals of care 07/10/2015     Priority: Medium     Overview:   Wants only comfort cares if not cognitively intact. Does not want to go to skilled nursing facility.       ACP (advance care planning) 12/10/2013     Priority: Medium     Malignant neoplasm of prostate (H) 2011     Priority: Medium     Overview:   Completed radiation therapy in August  of 2012 at [facility]       Hyperlipidemia 10/04/2011     Priority: Medium     Hearing loss, sensorineural 11/10/2009     Priority: Medium     Osteoarthrosis 11/10/2009     Priority: Medium     Seborrheic keratosis 11/10/2009     Priority: Medium     Tinnitus 11/10/2009     Priority: Medium   ,   Past Surgical History:   Procedure Laterality Date     COLONOSCOPY       Hyperplastic polyp at 20 cm.     COLONOSCOPY      05,next colonoscopy due in      COLONOSCOPY  2015    Follow up 10 years 2025, hyperplastic     PROSTATE SURGERY      Seeds and radiation    and   Social History     Tobacco Use     Smoking status: Former Smoker     Types: Cigarettes     Last attempt to quit: 1979     Years since quittin.5     Smokeless tobacco: Never Used   Substance Use Topics     Alcohol use: Yes     Comment: very rare     OBJECTIVE:  /84   Pulse 68   Temp 97.2  F (36.2  C) (Temporal)   Resp 16   Wt 82.6 kg (182 lb)   SpO2 97%   BMI 28.73 kg/m     EXAM:  General Appearance: Pleasant, alert, appropriate appearance for age. No acute distress  Head Exam: Normal. Normocephalic, atraumatic.  Eye Exam:  Normal external eyes, conjunctivae, lids, cornea. LAURITA. EOMI  Ear Exam: Normal TM's bilaterally. Normal auditory canals and external ears. Non-tender.  Nose Exam: Normal external nose, mucus membranes, and septum.  OroPharynx Exam:  Dental hygiene adequate. Normal buccal mucosa. Normal pharynx.  Neck Exam:  Supple, no masses or nodes. No audible bruits  Thyroid Exam: No nodules or enlargement.  Chest/Respiratory Exam: Normal chest wall and respirations. Clear to auscultation.  Cardiovascular Exam: Regular rate and rhythm. S1, S2, no murmur, click, gallop, or rubs.  Gastrointestinal Exam: Soft, non-tender, no masses or organomegaly.  Lymphatic Exam: Non-palpable nodes in neck, clavicular regions.  Musculoskeletal Exam: Back is straight and non-tender, full ROM of upper and lower  extremities.  Foot Exam: Left and right foot: good pedal pulses  Skin: no rash or abnormalities  Neurologic Exam: Nonfocal, normal gross motor, tone coordination and no tremor.  Psychiatric Exam: Alert and oriented - appropriate affect.   ASSESSMENT/Plan :    Heriberto was seen today for medicare visit.    Diagnoses and all orders for this visit:    Encounter for Medicare annual wellness exam    Coronary artery calcification seen on CT scan, Score 1504  -     CBC with platelets differential; Future  -     Comprehensive metabolic panel; Future    Malignant neoplasm of prostate (H)  -     Prostate Specific Antigen GH; Future  -     alfuzosin ER (UROXATRAL) 10 MG 24 hr tablet; Take 1 tablet (10 mg) by mouth daily with food    Polycythemia  -     CBC with platelets differential; Future    Hyperlipidemia, unspecified hyperlipidemia type  -     Comprehensive metabolic panel; Future  -     Lipid Profile; Future  -     rosuvastatin (CRESTOR) 20 MG tablet; Take 1 tablet (20 mg) by mouth daily    Sensorineural hearing loss (SNHL) of both ears    Dilatation of thoracic aorta, 4 cm (6/20)      Will notify of lab results when available. Discussed diet, exercise and healthy lifestyle changes. Continue current medications. Reviewed echo results. Plan to repeat in one year.    A total of 25 minutes was spent with the patient, greater than 50% of the time was spent in counseling/discussion of the aforementioned concerns in addition to Medicare Wellness.        Maurice Agustin MD

## 2020-07-01 NOTE — PROGRESS NOTES
"  SUBJECTIVE:   Heriberto Noriega is a 74 year old male who presents for Preventive Visit.      Are you in the first 12 months of your Medicare Part B coverage?  No    Physical Health:    In general, how would you rate your overall physical health? excellent    Outside of work, how many days during the week do you exercise? 6-7 days/week    Outside of work, approximately how many minutes a day do you exercise?30-45 minutes    If you drink alcohol do you typically have >3 drinks per day or >7 drinks per week? No    Do you usually eat at least 4 servings of fruit and vegetables a day, include whole grains & fiber and avoid regularly eating high fat or \"junk\" foods? NO    Do you have any problems taking medications regularly?  No    Do you have any side effects from medications? none    Needs assistance for the following daily activities: no assistance needed    Which of the following safety concerns are present in your home?  none identified     Hearing impairment: Yes, has hearing aides    In the past 6 months, have you been bothered by leaking of urine? no    Mental Health:    In general, how would you rate your overall mental or emotional health? excellent  PHQ-2 Score: 0    Do you feel safe in your environment? Yes    Have you ever done Advance Care Planning? (For example, a Health Directive, POLST, or a discussion with a medical provider or your loved ones about your wishes): Yes, advance care planning is on file.    Additional concerns to address?  No    Fall risk:  Fallen 2 or more times in the past year?: No  Any fall with injury in the past year?: No    Cognitive Screenin) Repeat 3 items (Leader, Season, Table)    2) Clock draw: NORMAL  3) 3 item recall: Recalls 2 objects   Results: NORMAL clock, 1-2 items recalled: COGNITIVE IMPAIRMENT LESS LIKELY    Mini-CogTM Copyright KSENIA Renner. Licensed by the author for use in [facility]; reprinted with permission (marilin@.edu). All rights reserved.  " "    Do you have sleep apnea, excessive snoring or daytime drowsiness?: C-pap machine    Echocardiogram yesterday      See below.    Reviewed and updated as needed this visit by clinical staff  Tobacco  Allergies  Med Hx  Surg Hx  Fam Hx  Soc Hx        Reviewed and updated as needed this visit by Provider        Social History     Tobacco Use     Smoking status: Former Smoker     Types: Cigarettes     Last attempt to quit: 1979     Years since quittin.5     Smokeless tobacco: Never Used   Substance Use Topics     Alcohol use: Yes     Comment: very rare                           Current providers sharing in care for this patient include:   Patient Care Team:  Maurice Agustin MD as PCP - General  Maurice Agustin MD as Assigned PCP    The following health maintenance items are reviewed in Epic and correct as of today:  Health Maintenance   Topic Date Due     MEDICARE ANNUAL WELLNESS VISIT  2009     PHQ-2  2020     ADVANCE CARE PLANNING  07/10/2020     INFLUENZA VACCINE (1) 2020     FALL RISK ASSESSMENT  2020     LIPID  2024     DTAP/TDAP/TD IMMUNIZATION (4 - Td) 2024     COLORECTAL CANCER SCREENING  2025     PNEUMOCOCCAL IMMUNIZATION 65+ LOW/MEDIUM RISK  Completed     ZOSTER IMMUNIZATION  Completed     AORTIC ANEURYSM SCREENING (SYSTEM ASSIGNED)  Completed     IPV IMMUNIZATION  Aged Out     MENINGITIS IMMUNIZATION  Aged Out       ROS:  See below    OBJECTIVE:   /84   Pulse 68   Temp 97.2  F (36.2  C) (Temporal)   Resp 16   Wt 82.6 kg (182 lb)   SpO2 97%   BMI 28.73 kg/m   Estimated body mass index is 28.73 kg/m  as calculated from the following:    Height as of 19: 1.695 m (5' 6.73\").    Weight as of this encounter: 82.6 kg (182 lb).  EXAM:   See below.    Diagnostic Test Results:  Labs reviewed in Epic    ASSESSMENT / PLAN:   Heriberto was seen today for medicare visit.    Diagnoses and all orders for this visit:    Encounter for Medicare annual " "wellness exam    Coronary artery calcification seen on CT scan, Score 1504  -     CBC with platelets differential; Future  -     Comprehensive metabolic panel; Future    Malignant neoplasm of prostate (H)  -     Prostate Specific Antigen GH; Future  -     alfuzosin ER (UROXATRAL) 10 MG 24 hr tablet; Take 1 tablet (10 mg) by mouth daily with food    Polycythemia  -     CBC with platelets differential; Future    Hyperlipidemia, unspecified hyperlipidemia type  -     Comprehensive metabolic panel; Future  -     Lipid Profile; Future  -     rosuvastatin (CRESTOR) 20 MG tablet; Take 1 tablet (20 mg) by mouth daily    Sensorineural hearing loss (SNHL) of both ears    Dilatation of thoracic aorta, 4 cm (6/20)        COUNSELING:  Reviewed preventive health counseling, as reflected in patient instructions       Regular exercise       Healthy diet/nutrition    Estimated body mass index is 28.73 kg/m  as calculated from the following:    Height as of 9/16/19: 1.695 m (5' 6.73\").    Weight as of this encounter: 82.6 kg (182 lb).         reports that he quit smoking about 41 years ago. His smoking use included cigarettes. He has never used smokeless tobacco.      Appropriate preventive services were discussed with this patient, including applicable screening as appropriate for cardiovascular disease, diabetes, osteopenia/osteoporosis, and glaucoma.  As appropriate for age/gender, discussed screening for colorectal cancer, prostate cancer, breast cancer, and cervical cancer. Checklist reviewing preventive services available has been given to the patient.    Reviewed patients plan of care and provided an AVS. The Basic Care Plan (routine screening as documented in Health Maintenance) for Heriberto meets the Care Plan requirement. This Care Plan has been established and reviewed with the Patient.    Counseling Resources:  ATP IV Guidelines  Pooled Cohorts Equation Calculator  Breast Cancer Risk Calculator  FRAX Risk Assessment  ICSI " Preventive Guidelines  Dietary Guidelines for Americans, 2010  USDA's MyPlate  ASA Prophylaxis  Lung CA Screening    Maurice Agustin MD  [facility]

## 2020-07-01 NOTE — PATIENT INSTRUCTIONS
Patient Education   Personalized Prevention Plan  You are due for the preventive services outlined below.  Your care team is available to assist you in scheduling these services.  If you have already completed any of these items, please share that information with your care team to update in your medical record.  Health Maintenance Due   Topic Date Due     Annual Wellness Visit  08/02/2009     PHQ-2  01/01/2020     Discuss Advance Care Planning  07/10/2020

## 2020-07-01 NOTE — NURSING NOTE
"Chief Complaint   Patient presents with     Medicare Visit     annual       Initial /84   Pulse 68   Temp 97.2  F (36.2  C) (Temporal)   Resp 16   Wt 82.6 kg (182 lb)   SpO2 97%   BMI 28.73 kg/m   Estimated body mass index is 28.73 kg/m  as calculated from the following:    Height as of 9/16/19: 1.695 m (5' 6.73\").    Weight as of this encounter: 82.6 kg (182 lb).  Medication Reconciliation: complete    Leanne Hargrove LPN  "

## 2020-11-20 ENCOUNTER — MYC MEDICAL ADVICE (OUTPATIENT)
Dept: FAMILY MEDICINE | Facility: OTHER | Age: 75
End: 2020-11-20

## 2021-01-15 ENCOUNTER — MYC MEDICAL ADVICE (OUTPATIENT)
Dept: FAMILY MEDICINE | Facility: OTHER | Age: 76
End: 2021-01-15

## 2021-03-09 ENCOUNTER — MYC MEDICAL ADVICE (OUTPATIENT)
Dept: FAMILY MEDICINE | Facility: OTHER | Age: 76
End: 2021-03-09

## 2021-03-09 DIAGNOSIS — H93.13 TINNITUS, BILATERAL: Primary | ICD-10-CM

## 2021-03-30 ENCOUNTER — TRANSFERRED RECORDS (OUTPATIENT)
Dept: HEALTH INFORMATION MANAGEMENT | Facility: CLINIC | Age: 76
End: 2021-03-30

## 2021-03-30 ENCOUNTER — OFFICE VISIT (OUTPATIENT)
Dept: OTOLARYNGOLOGY | Facility: OTHER | Age: 76
End: 2021-03-30
Attending: OTOLARYNGOLOGY
Payer: COMMERCIAL

## 2021-03-30 DIAGNOSIS — H93.13 TINNITUS, BILATERAL: Primary | ICD-10-CM

## 2021-03-30 PROCEDURE — G0463 HOSPITAL OUTPT CLINIC VISIT: HCPCS

## 2021-03-30 NOTE — PROGRESS NOTES
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  He has worn hearing aids for over 10 years that are viewed.  [facility] this [facility].  He does not have a copy of the test for me today.  He presents today shin gradually building tinnitus as his hearing worsens.  He states there were no significant asymmetries on his recent hearing test.    Exam  The external auditory canals and TMs are clear bilaterally  The remainder of the head neck exam is unremarkable    A&P  Assessment & Plan  (1) Tinnitus of both ears:        Status: Acute        Code(s):  H93.13 - Tinnitus, bilateral  Additional Plan Details  Plan Details: He was counseled for tinnitus management.  He was encouraged to have a copy of his audiogram forwarded to me for review.  He is welcome to follow up if he has worsening symptoms.      Florentino Payne MD    03/30/21 0759    <Electronically signed by Florentino Payne MD> 03/31/21 [id number]

## 2021-07-23 DIAGNOSIS — C61 MALIGNANT NEOPLASM OF PROSTATE (H): ICD-10-CM

## 2021-07-23 NOTE — TELEPHONE ENCOUNTER
" Disp Refills Start End MALGORZATA   alfuzosin ER (UROXATRAL) 10 MG 24 hr tablet 90 tablet 4 7/1/2020  No   Sig - Route: Take 1 tablet (10 mg) by mouth daily with food - Oral       LOV: 7/1/2020  Future Office visit: No future appointment scheduled at this time.      Routing refill request to provider for review/approval because:  Blood pressure out of range   Failed protocol    Requested Prescriptions   Pending Prescriptions Disp Refills     alfuzosin ER (UROXATRAL) 10 MG 24 hr tablet [Pharmacy Med Name: ALFUZOSIN HCL ER 10 MG TABLET] 90 tablet 4     Sig: TAKE 1 TABLET BY MOUTH EVERY DAY WITH FOOD       Alpha Blockers Failed - 7/23/2021  1:46 AM        Failed - Blood pressure under 140/90 in past 12 months     BP Readings from Last 3 Encounters:   07/01/20 126/84   09/16/19 132/82   08/06/19 126/64                 Failed - Recent (12 mo) or future (30 days) visit within the authorizing provider's specialty     Patient has had an office visit with the authorizing provider or a provider within the authorizing providers department within the previous 12 mos or has a future within next 30 days. See \"Patient Info\" tab in inbasket, or \"Choose Columns\" in Meds & Orders section of the refill encounter.              Passed - Patient does not have Tadalafil, Vardenafil, or Sildenafil on their medication list        Passed - Medication is active on med list        Passed - Patient is 18 years of age or older         Unable to complete prescription refill per RN Medication Refill Policy.................... Lulu Carpenter RN ....................  7/23/2021   10:22 AM        "

## 2021-07-23 NOTE — LETTER
July 23, 2021      Heriberto Noriega  [address] [place] [address]        Dear Heriberto,           This letter is to remind you that you are due for your annual exam with Maurice Agustin. Your last comprehensive visit was more than 12 months ago.    A refill request for Alfuzosin ER has been sent to your provider for review and consideration. Additional refills require you to complete this appointment.    Please call the clinic at [phone] to schedule your appointment.    If you should require additional refills before your scheduled appointment, please contact your pharmacy and we will refill your medication until the date of your appointment.    If you are no longer seeing Maurice Agustin for primary care, please call to let us know.       Thank you for choosing [facility] and [facility] for your health care needs.    Sincerely,    Refill FLORENTINO  [facility]

## 2021-07-26 RX ORDER — ALFUZOSIN HYDROCHLORIDE 10 MG/1
TABLET, EXTENDED RELEASE ORAL
Qty: 90 TABLET | Refills: 4 | Status: SHIPPED | OUTPATIENT
Start: 2021-07-26 | End: 2022-08-18

## 2021-08-07 ENCOUNTER — HEALTH MAINTENANCE LETTER (OUTPATIENT)
Age: 76
End: 2021-08-07

## 2021-08-17 ENCOUNTER — OFFICE VISIT (OUTPATIENT)
Dept: FAMILY MEDICINE | Facility: OTHER | Age: 76
End: 2021-08-17
Attending: FAMILY MEDICINE
Payer: COMMERCIAL

## 2021-08-17 VITALS
OXYGEN SATURATION: 98 % | BODY MASS INDEX: 26.97 KG/M2 | SYSTOLIC BLOOD PRESSURE: 130 MMHG | HEART RATE: 60 BPM | HEIGHT: 67 IN | RESPIRATION RATE: 16 BRPM | TEMPERATURE: 97.6 F | DIASTOLIC BLOOD PRESSURE: 78 MMHG | WEIGHT: 171.8 LBS

## 2021-08-17 DIAGNOSIS — E78.5 HYPERLIPIDEMIA, UNSPECIFIED HYPERLIPIDEMIA TYPE: ICD-10-CM

## 2021-08-17 DIAGNOSIS — Z00.00 ENCOUNTER FOR MEDICARE ANNUAL WELLNESS EXAM: Primary | ICD-10-CM

## 2021-08-17 DIAGNOSIS — I77.810 DILATATION OF THORACIC AORTA (H): ICD-10-CM

## 2021-08-17 DIAGNOSIS — H90.3 SENSORINEURAL HEARING LOSS (SNHL) OF BOTH EARS: ICD-10-CM

## 2021-08-17 DIAGNOSIS — C61 MALIGNANT NEOPLASM OF PROSTATE (H): ICD-10-CM

## 2021-08-17 DIAGNOSIS — I25.10 CORONARY ARTERY CALCIFICATION SEEN ON CT SCAN: ICD-10-CM

## 2021-08-17 DIAGNOSIS — H93.13 TINNITUS OF BOTH EARS: ICD-10-CM

## 2021-08-17 DIAGNOSIS — D75.1 POLYCYTHEMIA: ICD-10-CM

## 2021-08-17 LAB
ALBUMIN SERPL-MCNC: 4.5 G/DL (ref 3.5–5.7)
ALP SERPL-CCNC: 96 U/L (ref 34–104)
ALT SERPL W P-5'-P-CCNC: 38 U/L (ref 7–52)
ANION GAP SERPL CALCULATED.3IONS-SCNC: 7 MMOL/L (ref 3–14)
AST SERPL W P-5'-P-CCNC: 24 U/L (ref 13–39)
BASOPHILS # BLD AUTO: 0.1 10E3/UL (ref 0–0.2)
BASOPHILS NFR BLD AUTO: 1 %
BILIRUB SERPL-MCNC: 1 MG/DL (ref 0.3–1)
BUN SERPL-MCNC: 25 MG/DL (ref 7–25)
CALCIUM SERPL-MCNC: 10.4 MG/DL (ref 8.6–10.3)
CHLORIDE BLD-SCNC: 105 MMOL/L (ref 98–107)
CHOLEST SERPL-MCNC: 136 MG/DL
CO2 SERPL-SCNC: 27 MMOL/L (ref 21–31)
CREAT SERPL-MCNC: 0.8 MG/DL (ref 0.7–1.3)
EOSINOPHIL # BLD AUTO: 0.2 10E3/UL (ref 0–0.7)
EOSINOPHIL NFR BLD AUTO: 4 %
ERYTHROCYTE [DISTWIDTH] IN BLOOD BY AUTOMATED COUNT: 13 % (ref 10–15)
FASTING STATUS PATIENT QL REPORTED: YES
GFR SERPL CREATININE-BSD FRML MDRD: 86 ML/MIN/1.73M2
GLUCOSE BLD-MCNC: 98 MG/DL (ref 70–105)
HCT VFR BLD AUTO: 48 % (ref 40–53)
HDLC SERPL-MCNC: 40 MG/DL (ref 23–92)
HGB BLD-MCNC: 16.5 G/DL (ref 13.3–17.7)
IMM GRANULOCYTES # BLD: 0 10E3/UL
IMM GRANULOCYTES NFR BLD: 0 %
LDLC SERPL CALC-MCNC: 74 MG/DL
LYMPHOCYTES # BLD AUTO: 1.1 10E3/UL (ref 0.8–5.3)
LYMPHOCYTES NFR BLD AUTO: 25 %
MCH RBC QN AUTO: 31.8 PG (ref 26.5–33)
MCHC RBC AUTO-ENTMCNC: 34.4 G/DL (ref 31.5–36.5)
MCV RBC AUTO: 93 FL (ref 78–100)
MONOCYTES # BLD AUTO: 0.4 10E3/UL (ref 0–1.3)
MONOCYTES NFR BLD AUTO: 9 %
NEUTROPHILS # BLD AUTO: 2.7 10E3/UL (ref 1.6–8.3)
NEUTROPHILS NFR BLD AUTO: 61 %
NONHDLC SERPL-MCNC: 96 MG/DL
NRBC # BLD AUTO: 0 10E3/UL
NRBC BLD AUTO-RTO: 0 /100
PLATELET # BLD AUTO: 138 10E3/UL (ref 150–450)
POTASSIUM BLD-SCNC: 4.1 MMOL/L (ref 3.5–5.1)
PROT SERPL-MCNC: 7.4 G/DL (ref 6.4–8.9)
PSA SERPL-MCNC: 0.02 UG/L (ref 0–4)
RBC # BLD AUTO: 5.19 10E6/UL (ref 4.4–5.9)
SODIUM SERPL-SCNC: 139 MMOL/L (ref 134–144)
TRIGL SERPL-MCNC: 108 MG/DL
WBC # BLD AUTO: 4.5 10E3/UL (ref 4–11)

## 2021-08-17 PROCEDURE — G0439 PPPS, SUBSEQ VISIT: HCPCS | Performed by: FAMILY MEDICINE

## 2021-08-17 PROCEDURE — 36415 COLL VENOUS BLD VENIPUNCTURE: CPT | Mod: ZL | Performed by: FAMILY MEDICINE

## 2021-08-17 PROCEDURE — 99214 OFFICE O/P EST MOD 30 MIN: CPT | Mod: 25 | Performed by: FAMILY MEDICINE

## 2021-08-17 PROCEDURE — G0463 HOSPITAL OUTPT CLINIC VISIT: HCPCS

## 2021-08-17 PROCEDURE — 80061 LIPID PANEL: CPT | Mod: ZL | Performed by: FAMILY MEDICINE

## 2021-08-17 PROCEDURE — 84153 ASSAY OF PSA TOTAL: CPT | Mod: ZL | Performed by: FAMILY MEDICINE

## 2021-08-17 PROCEDURE — 85004 AUTOMATED DIFF WBC COUNT: CPT | Mod: ZL | Performed by: FAMILY MEDICINE

## 2021-08-17 PROCEDURE — 82040 ASSAY OF SERUM ALBUMIN: CPT | Mod: ZL | Performed by: FAMILY MEDICINE

## 2021-08-17 RX ORDER — ROSUVASTATIN CALCIUM 20 MG/1
20 TABLET, COATED ORAL DAILY
Qty: 90 TABLET | Refills: 4 | Status: SHIPPED | OUTPATIENT
Start: 2021-08-17 | End: 2022-08-18

## 2021-08-17 ASSESSMENT — PAIN SCALES - GENERAL: PAINLEVEL: NO PAIN (0)

## 2021-08-17 ASSESSMENT — MIFFLIN-ST. JEOR: SCORE: 1454.97

## 2021-08-17 NOTE — NURSING NOTE
"Chief Complaint   Patient presents with     Medicare Visit     annual       Initial /78   Pulse 60   Temp 97.6  F (36.4  C) (Temporal)   Resp 16   Ht 1.689 m (5' 6.5\")   Wt 77.9 kg (171 lb 12.8 oz)   SpO2 98%   BMI 27.31 kg/m   Estimated body mass index is 27.31 kg/m  as calculated from the following:    Height as of this encounter: 1.689 m (5' 6.5\").    Weight as of this encounter: 77.9 kg (171 lb 12.8 oz).  Medication Reconciliation: complete    FOOD SECURITY SCREENING QUESTIONS  Hunger Vital Signs:  Within the past 12 months we worried whether our food would run out before we got money to buy more. Never  Within the past 12 months the food we bought just didn't last and we didn't have money to get more. Never    Leanne Hargrove, [place]  "

## 2021-08-17 NOTE — PROGRESS NOTES
"  SUBJECTIVE:   Heriberto Noriega is a 76 year old male who presents for Preventive Visit.      Patient has been advised of split billing requirements and indicates understanding: Yes  Are you in the first 12 months of your Medicare Part B coverage?  No    Physical Health:    In general, how would you rate your overall physical health? excellent    Outside of work, how many days during the week do you exercise? 6-7 days/week    Outside of work, approximately how many minutes a day do you exercise?45-60 minutes    If you drink alcohol do you typically have >3 drinks per day or >7 drinks per week? No    Do you usually eat at least 4 servings of fruit and vegetables a day, include whole grains & fiber and avoid regularly eating high fat or \"junk\" foods? NO does a KETO diet    Do you have any problems taking medications regularly?  No    Do you have any side effects from medications? none    Needs assistance for the following daily activities: no assistance needed    Which of the following safety concerns are present in your home?  none identified     Hearing impairment: Yes, has hearing aides    In the past 6 months, have you been bothered by leaking of urine? no    Mental Health:    In general, how would you rate your overall mental or emotional health? excellent  PHQ-2 Score: 0    Do you feel safe in your environment? Yes    Have you ever done Advance Care Planning? (For example, a Health Directive, POLST, or a discussion with a medical provider or your loved ones about your wishes): Yes, advance care planning is on file.    Additional concerns to address?  No    Fall risk:  Fallen 2 or more times in the past year?: No  Any fall with injury in the past year?: No    Cognitive Screenin) Repeat 3 items (Leader, Season, Table)    2) Clock draw: NORMAL  3) 3 item recall: Recalls 3 objects  Results: 3 items recalled: COGNITIVE IMPAIRMENT LESS LIKELY    Mini-CogTM Copyright S Jud. Licensed by the author for use in " "[facility]; reprinted with permission (katietracey@[facility]). All rights reserved.      Do you have sleep apnea, excessive snoring or daytime drowsiness?: yes has a c-pap machine    Reviewed and updated as needed this visit by clinical staff  Tobacco  Allergies  Meds      Soc Hx        Reviewed and updated as needed this visit by Provider                Social History     Tobacco Use     Smoking status: Former Smoker     Types: Cigarettes     Quit date: 1979     Years since quittin.6     Smokeless tobacco: Never Used   Substance Use Topics     Alcohol use: Yes     Comment: very rare                           Current providers sharing in care for this patient include:   Patient Care Team:  Maurice Agustin MD as PCP - General  Maurice Agustin MD as Assigned PCP    The following health maintenance items are reviewed in Epic and correct as of today:  Health Maintenance   Topic Date Due     HEPATITIS C SCREENING  Never done     MEDICARE ANNUAL WELLNESS VISIT  2021     FALL RISK ASSESSMENT  2021     INFLUENZA VACCINE (1) 2021     DTAP/TDAP/TD IMMUNIZATION (4 - Td or Tdap) 2024     LIPID  2025     ADVANCE CARE PLANNING  2025     PHQ-2  Completed     Pneumococcal Vaccine: 65+ Years  Completed     ZOSTER IMMUNIZATION  Completed     COVID-19 Vaccine  Completed     IPV IMMUNIZATION  Aged Out     MENINGITIS IMMUNIZATION  Aged Out     HEPATITIS B IMMUNIZATION  Aged Out     Labs reviewed in [facility]      ROS:  See above    OBJECTIVE:   /78   Pulse 60   Temp 97.6  F (36.4  C) (Temporal)   Resp 16   Ht 1.689 m (5' 6.5\")   Wt 77.9 kg (171 lb 12.8 oz)   SpO2 98%   BMI 27.31 kg/m   Estimated body mass index is 27.31 kg/m  as calculated from the following:    Height as of this encounter: 1.689 m (5' 6.5\").    Weight as of this encounter: 77.9 kg (171 lb 12.8 oz).  EXAM:   See above    Diagnostic Test Results:  Labs reviewed in Epic    ASSESSMENT / PLAN:   Heriberto was seen " "today for medicare visit.    Diagnoses and all orders for this visit:    Encounter for Medicare annual wellness exam    Hyperlipidemia, unspecified hyperlipidemia type  -     rosuvastatin (CRESTOR) 20 MG tablet; Take 1 tablet (20 mg) by mouth daily  -     Comprehensive metabolic panel; Future  -     Lipid Profile; Future  -     Lipid Profile  -     Comprehensive metabolic panel    Coronary artery calcification seen on CT scan, Score 1504  -     CBC with Platelets & Differential; Future  -     CBC with Platelets & Differential    Dilatation of thoracic aorta, 4 cm (6/20)  -     CBC with Platelets & Differential; Future  -     CBC with Platelets & Differential    Malignant neoplasm of prostate (H)  -     PSA tumor marker; Future  -     PSA tumor marker    Polycythemia  -     CBC with Platelets & Differential; Future  -     CBC with Platelets & Differential    Tinnitus of both ears    Sensorineural hearing loss (SNHL) of both ears        Patient has been advised of split billing requirements and indicates understanding: Yes    COUNSELING:  Reviewed preventive health counseling, as reflected in patient instructions       Regular exercise       Healthy diet/nutrition    Estimated body mass index is 27.31 kg/m  as calculated from the following:    Height as of this encounter: 1.689 m (5' 6.5\").    Weight as of this encounter: 77.9 kg (171 lb 12.8 oz).        He reports that he quit smoking about 42 years ago. His smoking use included cigarettes. He has never used smokeless tobacco.    Appropriate preventive services were discussed with this patient, including applicable screening as appropriate for cardiovascular disease, diabetes, osteopenia/osteoporosis, and glaucoma.  As appropriate for age/gender, discussed screening for colorectal cancer, prostate cancer, breast cancer, and cervical cancer. Checklist reviewing preventive services available has been given to the patient.    Reviewed patients plan of care and provided " an AVS. The Basic Care Plan (routine screening as documented in Health Maintenance) for Heriberto meets the Care Plan requirement. This Care Plan has been established and reviewed with the Patient.    Counseling Resources:  ATP IV Guidelines  Pooled Cohorts Equation Calculator  Breast Cancer Risk Calculator  BRCA-Related Cancer Risk Assessment: FHS-7 Tool  FRAX Risk Assessment  ICSI Preventive Guidelines  Dietary Guidelines for Americans, 2010  USDA's MyPlate  ASA Prophylaxis  Lung CA Screening    Maurice Agustin MD  [facility]

## 2021-08-17 NOTE — PROGRESS NOTES
"Nursing Notes:   Leanne Hargrove LPN  8/17/2021  9:02 AM  Signed  Chief Complaint   Patient presents with     Medicare Visit     annual       Initial /78   Pulse 60   Temp 97.6  F (36.4  C) (Temporal)   Resp 16   Ht 1.689 m (5' 6.5\")   Wt 77.9 kg (171 lb 12.8 oz)   SpO2 98%   BMI 27.31 kg/m   Estimated body mass index is 27.31 kg/m  as calculated from the following:    Height as of this encounter: 1.689 m (5' 6.5\").    Weight as of this encounter: 77.9 kg (171 lb 12.8 oz).  Medication Reconciliation: complete    FOOD SECURITY SCREENING QUESTIONS  Hunger Vital Signs:  Within the past 12 months we worried whether our food would run out before we got money to buy more. Never  Within the past 12 months the food we bought just didn't last and we didn't have money to get more. Never    Leanne Hargrove LPN      SUBJECTIVE:  Heriberto Noriega  is a 76 year old male who comes in today for Medicare wellness visit and follow-up of his other medical problems.    He has hyperlipidemia and high calcium score consistent with coronary disease.  He had a normal stress echo 3 years ago.  He had negative AAA screening 2 years ago.  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  He saw Dr. Prado from cardiology about a year ago.  He continues on Crestor 20 mg daily for plaque stabilization to reduce his risk of heart attack and stroke by 30% or more.  His echocardiogram shows a normal ejection fraction.  He has an aspirin allergy.  His weight is down almost 40 pounds.  He has lost another 10 pounds in the last year.  He continues to exercise regularly.    He saw Dr. Payne this spring because of tinnitus.    He has a history in the past of prostate cancer.  His PSA has been extremely low and stable.    He is up-to-date on immunizations including COVID-19 vaccine.    Past Medical, Family, and Social History reviewed and updated as noted below.   ROS is negative except as noted above       Allergies "   Allergen Reactions     Aspirin Hives   ,   Family History   Problem Relation Age of Onset     Heart Disease Father          from CHF     Breast Cancer Mother          from Breast Cancer     Other - See Comments Brother         Agent orange exposure     Breast Cancer Sister      Heart Disease Brother         Heart Disease,cardiac surgery, CABG     Other - See Comments Brother          polio     Cancer Brother         Cancer, from lung cancer   ,   Current Outpatient Medications   Medication     alfuzosin ER (UROXATRAL) 10 MG 24 hr tablet     co-enzyme Q-10 50 MG CAPS     meclizine (ANTIVERT) 12.5 MG tablet     Probiotic Product ([employer] PROBIOTIC) CAPS     rosuvastatin (CRESTOR) 20 MG tablet     scopolamine (TRANSDERM) 1 MG/3DAYS 72 hr patch     No current facility-administered medications for this visit.   ,   Past Medical History:   Diagnosis Date     Cholelithiasis      Coronary artery calcification      Dietary counseling and surveillance     Low carbohydrate diet.     Dorsalgia     2005,with radicular symptoms     Enlarged prostate without lower urinary tract symptoms (luts)     Benign with mild urinary obstruction symptoms.     Hyperlipidemia      Malignant neoplasm of prostate (H)     2011,He completed radiation treatment for prostate cancer on .  Initially had Radioactive palladium seed implants done followed by external beam radiation     Secondary polycythemia     3/29/2017   ,   Patient Active Problem List    Diagnosis Date Noted     Dilatation of thoracic aorta, 4 cm () 2020     Priority: Medium     Coronary artery calcification seen on CT scan, Score 1504 2018     Priority: Medium     Chest pain 2018     Priority: Medium     Obesity 2018     Priority: Medium     Polycythemia 2017     Priority: Medium     Counseling regarding advanced directives and goals of care 07/10/2015     Priority: Medium     Overview:   Wants only comfort  "cares if not cognitively intact. Does not want to go to skilled nursing facility.       ACP (advance care planning) 12/10/2013     Priority: Medium     Malignant neoplasm of prostate (H) 2011     Priority: Medium     Overview:   Completed radiation therapy in 2012 at [facility] 10/04/2011     Priority: Medium     Hearing loss, sensorineural 11/10/2009     Priority: Medium     Osteoarthrosis 11/10/2009     Priority: Medium     Seborrheic keratosis 11/10/2009     Priority: Medium     Tinnitus 11/10/2009     Priority: Medium   ,   Past Surgical History:   Procedure Laterality Date     COLONOSCOPY       Hyperplastic polyp at 20 cm.     COLONOSCOPY      05,next colonoscopy due in      COLONOSCOPY  2015    Follow up 10 years 2025, hyperplastic     PROSTATE SURGERY      Seeds and radiation    and   Social History     Tobacco Use     Smoking status: Former Smoker     Types: Cigarettes     Quit date: 1979     Years since quittin.6     Smokeless tobacco: Never Used   Substance Use Topics     Alcohol use: Yes     Comment: very rare     OBJECTIVE:  /78   Pulse 60   Temp 97.6  F (36.4  C) (Temporal)   Resp 16   Ht 1.689 m (5' 6.5\")   Wt 77.9 kg (171 lb 12.8 oz)   SpO2 98%   BMI 27.31 kg/m     EXAM:  General Appearance: Pleasant, alert, appropriate appearance for age. No acute distress  Head Exam: Normal. Normocephalic, atraumatic.  Eye Exam:  Normal external eyes, conjunctivae, lids, cornea. LAURITA. EOMI  Ear Exam: Normal TM's bilaterally. Normal auditory canals and external ears. Non-tender.  Nose Exam: Normal external nose, mucus membranes, and septum.  OroPharynx Exam:  Dental hygiene adequate. Normal buccal mucosa. Normal pharynx.  Neck Exam:  Supple, no masses or nodes. No audible bruits  Thyroid Exam: No nodules or enlargement.  Chest/Respiratory Exam: Normal chest wall and respirations. Clear to auscultation.  Cardiovascular Exam: " Regular rate and rhythm. S1, S2, no murmur, click, gallop, or rubs.  Gastrointestinal Exam: Soft, non-tender, no masses or organomegaly.  Lymphatic Exam: Non-palpable nodes in neck, clavicular regions.  Musculoskeletal Exam: Back is straight and non-tender, full ROM of upper and lower extremities.  Foot Exam: Left and right foot: good pedal pulses  Skin: no rash or abnormalities  Neurologic Exam: Nonfocal, normal gross motor, tone coordination and no tremor.  Psychiatric Exam: Alert and oriented - appropriate affect.     Results for orders placed or performed in visit on 08/17/21   PSA tumor marker     Status: Normal   Result Value Ref Range    PSA Tumor Marker 0.02 0.00 - 4.00 ug/L   Lipid Profile     Status: None   Result Value Ref Range    Cholesterol 136 <200 mg/dL    Triglycerides 108 <150 mg/dL    Direct Measure HDL 40 23 - 92 mg/dL    LDL Cholesterol Calculated 74 <=100 mg/dL    Non HDL Cholesterol 96 <130 mg/dL    Patient Fasting > 8hrs? Yes    Comprehensive metabolic panel     Status: Abnormal   Result Value Ref Range    Sodium 139 134 - 144 mmol/L    Potassium 4.1 3.5 - 5.1 mmol/L    Chloride 105 98 - 107 mmol/L    Carbon Dioxide (CO2) 27 21 - 31 mmol/L    Anion Gap 7 3 - 14 mmol/L    Urea Nitrogen 25 7 - 25 mg/dL    Creatinine 0.80 0.70 - 1.30 mg/dL    Calcium 10.4 (H) 8.6 - 10.3 mg/dL    Glucose 98 70 - 105 mg/dL    Alkaline Phosphatase 96 34 - 104 U/L    AST 24 13 - 39 U/L    ALT 38 7 - 52 U/L    Protein Total 7.4 6.4 - 8.9 g/dL    Albumin 4.5 3.5 - 5.7 g/dL    Bilirubin Total 1.0 0.3 - 1.0 mg/dL    GFR Estimate 86 >60 mL/min/1.73m2   CBC with Platelets & Differential     Status: Abnormal    Narrative    The following orders were created for panel order CBC with Platelets & Differential.  Procedure                               Abnormality         Status                     ---------                               -----------         ------                     CBC with platelets and d...[[id number]]   Abnormal            Final result                 Please view results for these tests on the individual orders.   CBC with platelets and differential     Status: Abnormal   Result Value Ref Range    WBC Count 4.5 4.0 - 11.0 10e3/uL    RBC Count 5.19 4.40 - 5.90 10e6/uL    Hemoglobin 16.5 13.3 - 17.7 g/dL    Hematocrit 48.0 40.0 - 53.0 %    MCV 93 78 - 100 fL    MCH 31.8 26.5 - 33.0 pg    MCHC 34.4 31.5 - 36.5 g/dL    RDW 13.0 10.0 - 15.0 %    Platelet Count 138 (L) 150 - 450 10e3/uL    % Neutrophils 61 %    % Lymphocytes 25 %    % Monocytes 9 %    % Eosinophils 4 %    % Basophils 1 %    % Immature Granulocytes 0 %    NRBCs per 100 WBC 0 <1 /100    Absolute Neutrophils 2.7 1.6 - 8.3 10e3/uL    Absolute Lymphocytes 1.1 0.8 - 5.3 10e3/uL    Absolute Monocytes 0.4 0.0 - 1.3 10e3/uL    Absolute Eosinophils 0.2 0.0 - 0.7 10e3/uL    Absolute Basophils 0.1 0.0 - 0.2 10e3/uL    Absolute Immature Granulocytes 0.0 <=0.0 10e3/uL    Absolute NRBCs 0.0 10e3/uL      ASSESSMENT/Plan :    Heriberto was seen today for medicare visit.    Diagnoses and all orders for this visit:    Encounter for Medicare annual wellness exam    Hyperlipidemia, unspecified hyperlipidemia type  -     rosuvastatin (CRESTOR) 20 MG tablet; Take 1 tablet (20 mg) by mouth daily  -     Comprehensive metabolic panel; Future  -     Lipid Profile; Future  -     Lipid Profile  -     Comprehensive metabolic panel    Coronary artery calcification seen on CT scan, Score 1504  -     CBC with Platelets & Differential; Future  -     CBC with Platelets & Differential    Dilatation of thoracic aorta, 4 cm (6/20)  -     CBC with Platelets & Differential; Future  -     CBC with Platelets & Differential    Malignant neoplasm of prostate (H)  -     PSA tumor marker; Future  -     PSA tumor marker    Polycythemia  -     CBC with Platelets & Differential; Future  -     CBC with Platelets & Differential    Tinnitus of both ears    Sensorineural hearing loss (SNHL) of both  ears      Will notify of lab results when available. Discussed diet, exercise and healthy lifestyle changes. Continue current medications.    Keep his appointment with cardiology this fall.  Discussed his thoracic aortic widening and follow-up for that.  Discussed the pathophysiology of this.    Congratulated him on his healthy lifestyle changes.    A total of 35 minutes was spent with the patient, reviewing records, tests, ordering medications, tests or procedures and documenting clinical information in the EHR in addition to Medicare Wellness.     Maurice Agustin MD

## 2021-09-02 ENCOUNTER — MYC MEDICAL ADVICE (OUTPATIENT)
Dept: FAMILY MEDICINE | Facility: OTHER | Age: 76
End: 2021-09-02

## 2021-10-02 ENCOUNTER — HEALTH MAINTENANCE LETTER (OUTPATIENT)
Age: 76
End: 2021-10-02

## 2022-07-18 ENCOUNTER — TELEPHONE (OUTPATIENT)
Dept: FAMILY MEDICINE | Facility: OTHER | Age: 77
End: 2022-07-18

## 2022-07-18 DIAGNOSIS — D75.1 POLYCYTHEMIA: Primary | ICD-10-CM

## 2022-07-18 DIAGNOSIS — M19.91 PRIMARY OSTEOARTHRITIS, UNSPECIFIED SITE: ICD-10-CM

## 2022-07-18 DIAGNOSIS — C61 MALIGNANT NEOPLASM OF PROSTATE (H): ICD-10-CM

## 2022-07-18 DIAGNOSIS — I25.10 CORONARY ARTERY CALCIFICATION SEEN ON CT SCAN: ICD-10-CM

## 2022-07-18 NOTE — TELEPHONE ENCOUNTER
The patient is coming in for a physical in August on the 18th.  He needs orders for lab work as he is coming in the afternoon that day.  Can you please put orders in so he can get labs done before his appointment. Send him a My Chart note or call him when orders are in.

## 2022-08-17 ENCOUNTER — LAB (OUTPATIENT)
Dept: LAB | Facility: OTHER | Age: 77
End: 2022-08-17
Attending: FAMILY MEDICINE
Payer: COMMERCIAL

## 2022-08-17 DIAGNOSIS — M19.91 PRIMARY OSTEOARTHRITIS, UNSPECIFIED SITE: ICD-10-CM

## 2022-08-17 DIAGNOSIS — I25.10 CORONARY ARTERY CALCIFICATION SEEN ON CT SCAN: ICD-10-CM

## 2022-08-17 DIAGNOSIS — C61 MALIGNANT NEOPLASM OF PROSTATE (H): ICD-10-CM

## 2022-08-17 DIAGNOSIS — D75.1 POLYCYTHEMIA: ICD-10-CM

## 2022-08-17 LAB
ALBUMIN SERPL-MCNC: 4.2 G/DL (ref 3.5–5.7)
ALP SERPL-CCNC: 81 U/L (ref 34–104)
ALT SERPL W P-5'-P-CCNC: 34 U/L (ref 7–52)
ANION GAP SERPL CALCULATED.3IONS-SCNC: 5 MMOL/L (ref 3–14)
AST SERPL W P-5'-P-CCNC: 22 U/L (ref 13–39)
BASOPHILS # BLD AUTO: 0.1 10E3/UL (ref 0–0.2)
BASOPHILS NFR BLD AUTO: 1 %
BILIRUB SERPL-MCNC: 1.1 MG/DL (ref 0.3–1)
BUN SERPL-MCNC: 25 MG/DL (ref 7–25)
CALCIUM SERPL-MCNC: 9.9 MG/DL (ref 8.6–10.3)
CHLORIDE BLD-SCNC: 106 MMOL/L (ref 98–107)
CHOLEST SERPL-MCNC: 133 MG/DL
CO2 SERPL-SCNC: 27 MMOL/L (ref 21–31)
CREAT SERPL-MCNC: 0.84 MG/DL (ref 0.7–1.3)
EOSINOPHIL # BLD AUTO: 0.3 10E3/UL (ref 0–0.7)
EOSINOPHIL NFR BLD AUTO: 6 %
ERYTHROCYTE [DISTWIDTH] IN BLOOD BY AUTOMATED COUNT: 13.6 % (ref 10–15)
FASTING STATUS PATIENT QL REPORTED: YES
GFR SERPL CREATININE-BSD FRML MDRD: 89 ML/MIN/1.73M2
GLUCOSE BLD-MCNC: 97 MG/DL (ref 70–105)
HCT VFR BLD AUTO: 48.2 % (ref 40–53)
HDLC SERPL-MCNC: 37 MG/DL (ref 23–92)
HGB BLD-MCNC: 16.8 G/DL (ref 13.3–17.7)
IMM GRANULOCYTES # BLD: 0 10E3/UL
IMM GRANULOCYTES NFR BLD: 0 %
LDLC SERPL CALC-MCNC: 67 MG/DL
LYMPHOCYTES # BLD AUTO: 1.4 10E3/UL (ref 0.8–5.3)
LYMPHOCYTES NFR BLD AUTO: 25 %
MCH RBC QN AUTO: 32 PG (ref 26.5–33)
MCHC RBC AUTO-ENTMCNC: 34.9 G/DL (ref 31.5–36.5)
MCV RBC AUTO: 92 FL (ref 78–100)
MONOCYTES # BLD AUTO: 0.5 10E3/UL (ref 0–1.3)
MONOCYTES NFR BLD AUTO: 9 %
NEUTROPHILS # BLD AUTO: 3.2 10E3/UL (ref 1.6–8.3)
NEUTROPHILS NFR BLD AUTO: 59 %
NONHDLC SERPL-MCNC: 96 MG/DL
NRBC # BLD AUTO: 0 10E3/UL
NRBC BLD AUTO-RTO: 0 /100
PLATELET # BLD AUTO: 159 10E3/UL (ref 150–450)
POTASSIUM BLD-SCNC: 4.2 MMOL/L (ref 3.5–5.1)
PROT SERPL-MCNC: 7.3 G/DL (ref 6.4–8.9)
PSA SERPL-MCNC: 0.01 UG/L (ref 0–4)
RBC # BLD AUTO: 5.25 10E6/UL (ref 4.4–5.9)
SODIUM SERPL-SCNC: 138 MMOL/L (ref 134–144)
TRIGL SERPL-MCNC: 143 MG/DL
WBC # BLD AUTO: 5.5 10E3/UL (ref 4–11)

## 2022-08-17 PROCEDURE — 80061 LIPID PANEL: CPT | Mod: ZL

## 2022-08-17 PROCEDURE — 84153 ASSAY OF PSA TOTAL: CPT | Mod: ZL

## 2022-08-17 PROCEDURE — 85004 AUTOMATED DIFF WBC COUNT: CPT | Mod: ZL

## 2022-08-17 PROCEDURE — 80053 COMPREHEN METABOLIC PANEL: CPT | Mod: ZL

## 2022-08-17 PROCEDURE — 36415 COLL VENOUS BLD VENIPUNCTURE: CPT | Mod: ZL

## 2022-08-18 ENCOUNTER — OFFICE VISIT (OUTPATIENT)
Dept: FAMILY MEDICINE | Facility: OTHER | Age: 77
End: 2022-08-18
Attending: FAMILY MEDICINE
Payer: COMMERCIAL

## 2022-08-18 VITALS
WEIGHT: 175.6 LBS | OXYGEN SATURATION: 96 % | HEART RATE: 68 BPM | RESPIRATION RATE: 16 BRPM | TEMPERATURE: 98.4 F | DIASTOLIC BLOOD PRESSURE: 78 MMHG | BODY MASS INDEX: 27.56 KG/M2 | HEIGHT: 67 IN | SYSTOLIC BLOOD PRESSURE: 104 MMHG

## 2022-08-18 DIAGNOSIS — I77.810 DILATATION OF THORACIC AORTA (H): ICD-10-CM

## 2022-08-18 DIAGNOSIS — Z00.00 ENCOUNTER FOR MEDICARE ANNUAL WELLNESS EXAM: Primary | ICD-10-CM

## 2022-08-18 DIAGNOSIS — C61 MALIGNANT NEOPLASM OF PROSTATE (H): ICD-10-CM

## 2022-08-18 DIAGNOSIS — H90.3 SENSORINEURAL HEARING LOSS (SNHL) OF BOTH EARS: ICD-10-CM

## 2022-08-18 DIAGNOSIS — I25.10 CORONARY ARTERY CALCIFICATION SEEN ON CT SCAN: ICD-10-CM

## 2022-08-18 DIAGNOSIS — F43.21 GRIEF REACTION: ICD-10-CM

## 2022-08-18 DIAGNOSIS — H93.13 TINNITUS, BILATERAL: ICD-10-CM

## 2022-08-18 DIAGNOSIS — E78.5 HYPERLIPIDEMIA, UNSPECIFIED HYPERLIPIDEMIA TYPE: ICD-10-CM

## 2022-08-18 PROCEDURE — G0439 PPPS, SUBSEQ VISIT: HCPCS | Performed by: FAMILY MEDICINE

## 2022-08-18 PROCEDURE — 99215 OFFICE O/P EST HI 40 MIN: CPT | Mod: 25 | Performed by: FAMILY MEDICINE

## 2022-08-18 PROCEDURE — G0463 HOSPITAL OUTPT CLINIC VISIT: HCPCS

## 2022-08-18 RX ORDER — ALFUZOSIN HYDROCHLORIDE 10 MG/1
TABLET, EXTENDED RELEASE ORAL
Qty: 90 TABLET | Refills: 4 | Status: SHIPPED | OUTPATIENT
Start: 2022-08-18 | End: 2023-01-01

## 2022-08-18 RX ORDER — ROSUVASTATIN CALCIUM 20 MG/1
20 TABLET, COATED ORAL DAILY
Qty: 90 TABLET | Refills: 4 | Status: SHIPPED | OUTPATIENT
Start: 2022-08-18 | End: 2023-01-01

## 2022-08-18 ASSESSMENT — ENCOUNTER SYMPTOMS
ABDOMINAL PAIN: 0
DIZZINESS: 0
SORE THROAT: 0
FREQUENCY: 0
NAUSEA: 0
NERVOUS/ANXIOUS: 0
CHILLS: 0
MYALGIAS: 0
HEADACHES: 0
JOINT SWELLING: 0
ARTHRALGIAS: 0
EYE PAIN: 0
DIARRHEA: 0
SHORTNESS OF BREATH: 0
PARESTHESIAS: 0
WEAKNESS: 0
DYSURIA: 0
PALPITATIONS: 0
HEARTBURN: 0
HEMATOCHEZIA: 0
HEMATURIA: 0
FEVER: 0
COUGH: 0
CONSTIPATION: 0

## 2022-08-18 ASSESSMENT — PAIN SCALES - GENERAL: PAINLEVEL: NO PAIN (0)

## 2022-08-18 ASSESSMENT — ACTIVITIES OF DAILY LIVING (ADL): CURRENT_FUNCTION: NO ASSISTANCE NEEDED

## 2022-08-18 NOTE — PROGRESS NOTES
"SUBJECTIVE:   Heriberto Noriega is a 77 year old male who presents for Preventive Visit.      Patient has been advised of split billing requirements and indicates understanding: No  Are you in the first 12 months of your Medicare coverage?  No    Healthy Habits:     In general, how would you rate your overall health?  Excellent    Frequency of exercise:  6-7 days/week    Duration of exercise:  45-60 minutes    Do you usually eat at least 4 servings of fruit and vegetables a day, include whole grains    & fiber and avoid regularly eating high fat or \"junk\" foods?  No    Taking medications regularly:  Yes    Medication side effects:  None    Ability to successfully perform activities of daily living:  No assistance needed    Home Safety:  No safety concerns identified    Hearing Impairment:  No hearing concerns    In the past 6 months, have you been bothered by leaking of urine?  No    In general, how would you rate your overall mental or emotional health?  Excellent      PHQ-2 Total Score: 0    Additional concerns today:  No    Do you feel safe in your environment? Yes    Have you ever done Advance Care Planning? (For example, a Health Directive, POLST, or a discussion with a medical provider or your loved ones about your wishes): Yes, patient states has an Advance Care Planning document and will bring a copy to the clinic.       Fall risk  Fallen 2 or more times in the past year?: No  Any fall with injury in the past year?: No    Cognitive Screening   1) Repeat 3 items (Leader, Season, Table)    2) Clock draw: NORMAL  3) 3 item recall: Recalls 2 objects   Results: NORMAL clock, 1-2 items recalled: COGNITIVE IMPAIRMENT LESS LIKELY    Mini-CogTM Copyright KSENIA Renner. Licensed by the author for use in [facility]; reprinted with permission (marilin@.[facility]). All rights reserved.      Do you have sleep apnea, excessive snoring or daytime drowsiness?: yes    Reviewed and updated as needed this visit by clinical " "staff   Tobacco  Allergies  Meds   Med Hx  Surg Hx  Fam Hx  Soc Hx       Review current opioid prescriptions    For a patient with a current opioid prescription:    Reviewed potential Opioid Use Disorder (OUD) risk factors: Yes NA    Evaluate their pain severity and current treatment plan:     Provide information on non-opioid treatment options:    Refer to a specialist, as appropriate:    Get more information on pain management in the [facility] Pain Management Best Practices Inter-agency Task Force Report    Screen for potential Substance Use Disorders (SUDs)    Reviewed the patient s potential risk factors for SUDs: Yes NA    Refer to treatment or specialist, as appropriate:     A screening tool isn t required but you may use one:  Find more information in the [place] on Drug Abuse Screening and Assessment Tools Chart      Reviewed and updated as needed this visit by Provider                   Social History     Tobacco Use     Smoking status: Former Smoker     Types: Cigarettes     Quit date: 1979     Years since quittin.6     Smokeless tobacco: Never Used   Substance Use Topics     Alcohol use: Yes     Comment: very rare     If you drink alcohol do you typically have >3 drinks per day or >7 drinks per week? No    Alcohol Use 2022   Prescreen: >3 drinks/day or >7 drinks/week? Not Applicable         Current providers sharing in care for this patient include:   Patient Care Team:  Maurice Agustin MD as PCP - General  Maurice Agustin MD as Assigned PCP    The following health maintenance items are reviewed in Epic and correct as of today:  Health Maintenance Due   Topic Date Due     HEPATITIS C SCREENING  Never done     MEDICARE ANNUAL WELLNESS VISIT  2022     Labs reviewed in EPIC        Review of Systems      OBJECTIVE:   /78   Pulse 68   Temp 98.4  F (36.9  C) (Temporal)   Resp 16   Ht 1.702 m (5' 7\")   Wt 79.7 kg (175 lb 9.6 oz)   SpO2 96%   BMI 27.50 kg/m   " "Estimated body mass index is 27.5 kg/m  as calculated from the following:    Height as of this encounter: 1.702 m (5' 7\").    Weight as of this encounter: 79.7 kg (175 lb 9.6 oz).  Physical Exam          ASSESSMENT / PLAN:   Heriberto was seen today for medicare visit.    Diagnoses and all orders for this visit:    Encounter for Medicare annual wellness exam    Coronary artery calcification seen on CT scan, Score 1504    Dilatation of thoracic aorta, 4 cm (6/20)    Hyperlipidemia, unspecified hyperlipidemia type  -     rosuvastatin (CRESTOR) 20 MG tablet; Take 1 tablet (20 mg) by mouth daily    Malignant neoplasm of prostate (H)  -     alfuzosin ER (UROXATRAL) 10 MG 24 hr tablet; TAKE 1 TABLET BY MOUTH EVERY DAY WITH FOOD    Tinnitus, bilateral    Sensorineural hearing loss (SNHL) of both ears    Grief reaction        Patient has been advised of split billing requirements and indicates understanding: Yes    COUNSELING:  Reviewed preventive health counseling, as reflected in patient instructions       Regular exercise       Healthy diet/nutrition       Fall risk prevention       Colon cancer screening    Estimated body mass index is 27.5 kg/m  as calculated from the following:    Height as of this encounter: 1.702 m (5' 7\").    Weight as of this encounter: 79.7 kg (175 lb 9.6 oz).        He reports that he quit smoking about 43 years ago. His smoking use included cigarettes. He has never used smokeless tobacco.      Appropriate preventive services were discussed with this patient, including applicable screening as appropriate for cardiovascular disease, diabetes, osteopenia/osteoporosis, and glaucoma.  As appropriate for age/gender, discussed screening for colorectal cancer, prostate cancer, breast cancer, and cervical cancer. Checklist reviewing preventive services available has been given to the patient.    Reviewed patients plan of care and provided an AVS. The Basic Care Plan (routine screening as documented in Health " Maintenance) for Heriberto meets the Care Plan requirement. This Care Plan has been established and reviewed with the Patient.    Counseling Resources:  ATP IV Guidelines  Pooled Cohorts Equation Calculator  Breast Cancer Risk Calculator  Breast Cancer: Medication to Reduce Risk  FRAX Risk Assessment  ICSI Preventive Guidelines  Dietary Guidelines for Americans, 2010  USDA's MyPlate  ASA Prophylaxis  Lung CA Screening    Maurice Agustin MD  [facility]    Identified Health Risks:

## 2022-08-18 NOTE — PROGRESS NOTES
"Nursing Notes:   Leanne Hargrove LPN  8/18/2022  1:43 PM  Signed  Chief Complaint   Patient presents with     Medicare Visit     annual       Initial /78   Pulse 68   Temp 98.4  F (36.9  C) (Temporal)   Resp 16   Ht 1.702 m (5' 7\")   Wt 79.7 kg (175 lb 9.6 oz)   SpO2 96%   BMI 27.50 kg/m   Estimated body mass index is 27.5 kg/m  as calculated from the following:    Height as of this encounter: 1.702 m (5' 7\").    Weight as of this encounter: 79.7 kg (175 lb 9.6 oz).  Medication Reconciliation: complete    FOOD SECURITY SCREENING QUESTIONS  Hunger Vital Signs:  Within the past 12 months we worried whether our food would run out before we got money to buy more. Never  Within the past 12 months the food we bought just didn't last and we didn't have money to get more. Never        Advance care directive on file? no  Advance care directive provided to patient? States she has one     Leanne Hargrove LPN      SUBJECTIVE:  Heriberto Noriega  is a 77 year old male who comes in today for Medicare wellness and follow-up of his other medical problems.  I last saw him a year ago.    He has hyperlipidemia and high calcium score consistent with coronary disease.  He had a normal stress echo 4 years ago.  He had negative AAA screening 3 years ago.  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  He saw Dr. Prado from cardiology last fall.  He continues on Crestor 20 mg daily for plaque stabilization to reduce his risk of heart attack and stroke by 30% or more.  His echocardiogram shows a normal ejection fraction.  He has an aspirin allergy.     He has a history in the past of prostate cancer and his PSA has been low and stable.  He had labs done yesterday.    He lost his son to chronic liver disease this April 2022.  Condolences given.  Lengthy discussion with regard to dealing with family members with chemical dependency and addiction.  Discussed frustrations ups and downs and the difficulty over " the last year.    He is up-to-date on all his immunizations including COVID-19 and double boosted.  He is up-to-date on health maintenance issues.     Past Medical, Family, and Social History reviewed and updated as noted below.   ROS is negative except as noted above       Allergies   Allergen Reactions     Aspirin Hives   ,   Family History   Problem Relation Age of Onset     Heart Disease Father          from CHF     Breast Cancer Mother          from Breast Cancer     Other - See Comments Brother         Agent orange exposure     Breast Cancer Sister      Heart Disease Brother         Heart Disease,cardiac surgery, CABG     Other - See Comments Brother          polio     Cancer Brother         Cancer, from lung cancer   ,   Current Outpatient Medications   Medication     alfuzosin ER (UROXATRAL) 10 MG 24 hr tablet     co-enzyme Q-10 50 MG CAPS     Probiotic Product ([facility] PROBIOTIC) CAPS     rosuvastatin (CRESTOR) 20 MG tablet     meclizine (ANTIVERT) 12.5 MG tablet     scopolamine (TRANSDERM) 1 MG/3DAYS 72 hr patch     No current facility-administered medications for this visit.   ,   Past Medical History:   Diagnosis Date     Cholelithiasis      Coronary artery calcification      Dietary counseling and surveillance     Low carbohydrate diet.     Dorsalgia     2005,with radicular symptoms     Enlarged prostate without lower urinary tract symptoms (luts)     Benign with mild urinary obstruction symptoms.     Hyperlipidemia      Malignant neoplasm of prostate (H)     2011,He completed radiation treatment for prostate cancer on .  Initially had Radioactive palladium seed implants done followed by external beam radiation     Secondary polycythemia     3/29/2017   ,   Patient Active Problem List    Diagnosis Date Noted     Dilatation of thoracic aorta, 4 cm () 2020     Priority: Medium     Coronary artery calcification seen on CT scan, Score 1504 2018      "Priority: Medium     Chest pain 2018     Priority: Medium     Obesity 2018     Priority: Medium     Polycythemia 2017     Priority: Medium     Counseling regarding advanced directives and goals of care 07/10/2015     Priority: Medium     Overview:   Wants only comfort cares if not cognitively intact. Does not want to go to skilled nursing facility.       ACP (advance care planning) 12/10/2013     Priority: Medium     Malignant neoplasm of prostate (H) 2011     Priority: Medium     Overview:   Completed radiation therapy in 2012 at [facility]       Hyperlipidemia 10/04/2011     Priority: Medium     Hearing loss, sensorineural 11/10/2009     Priority: Medium     Osteoarthrosis 11/10/2009     Priority: Medium     Seborrheic keratosis 11/10/2009     Priority: Medium     Tinnitus 11/10/2009     Priority: Medium   ,   Past Surgical History:   Procedure Laterality Date     COLONOSCOPY       Hyperplastic polyp at 20 cm.     COLONOSCOPY      05,next colonoscopy due in      COLONOSCOPY  2015    Follow up 10 years 2025, hyperplastic     PROSTATE SURGERY      Seeds and radiation    and   Social History     Tobacco Use     Smoking status: Former Smoker     Types: Cigarettes     Quit date: 1979     Years since quittin.6     Smokeless tobacco: Never Used   Substance Use Topics     Alcohol use: Yes     Comment: very rare     OBJECTIVE:  /78   Pulse 68   Temp 98.4  F (36.9  C) (Temporal)   Resp 16   Ht 1.702 m (5' 7\")   Wt 79.7 kg (175 lb 9.6 oz)   SpO2 96%   BMI 27.50 kg/m     EXAM:  General Appearance: Pleasant, alert, appropriate appearance for age. No acute distress  Head Exam: Normal. Normocephalic, atraumatic.  Eye Exam:  Normal external eyes, conjunctivae, lids, cornea. LAURITA. EOMI  Ear Exam: Normal TM's bilaterally. Normal auditory canals and external ears. Non-tender.  Nose Exam: Normal external nose, mucus membranes, and septum.  OroPharynx " Exam:  Dental hygiene adequate. Normal buccal mucosa. Normal pharynx.  Neck Exam:  Supple, no masses or nodes. No audible bruits  Thyroid Exam: No nodules or enlargement.  Chest/Respiratory Exam: Normal chest wall and respirations. Clear to auscultation.  Cardiovascular Exam: Regular rate and rhythm. S1, S2, no murmur, click, gallop, or rubs.  Gastrointestinal Exam: Soft, non-tender, no masses or organomegaly.  Lymphatic Exam: Non-palpable nodes in neck, clavicular regions.  Musculoskeletal Exam: Back is straight and non-tender, full ROM of upper and lower extremities.  Foot Exam: Left and right foot: good pedal pulses  Skin: no rash or abnormalities  Neurologic Exam: Nonfocal, normal gross motor, tone coordination and no tremor.  Psychiatric Exam: Alert and oriented - appropriate affect.     Recent Results (from the past 48 hour(s))   PSA tumor marker    Collection Time: 08/17/22  7:17 AM   Result Value Ref Range    PSA Tumor Marker 0.01 0.00 - 4.00 ug/L   Lipid Profile    Collection Time: 08/17/22  7:17 AM   Result Value Ref Range    Cholesterol 133 <200 mg/dL    Triglycerides 143 <150 mg/dL    Direct Measure HDL 37 23 - 92 mg/dL    LDL Cholesterol Calculated 67 <=100 mg/dL    Non HDL Cholesterol 96 <130 mg/dL    Patient Fasting > 8hrs? Yes    Comprehensive metabolic panel    Collection Time: 08/17/22  7:17 AM   Result Value Ref Range    Sodium 138 134 - 144 mmol/L    Potassium 4.2 3.5 - 5.1 mmol/L    Chloride 106 98 - 107 mmol/L    Carbon Dioxide (CO2) 27 21 - 31 mmol/L    Anion Gap 5 3 - 14 mmol/L    Urea Nitrogen 25 7 - 25 mg/dL    Creatinine 0.84 0.70 - 1.30 mg/dL    Calcium 9.9 8.6 - 10.3 mg/dL    Glucose 97 70 - 105 mg/dL    Alkaline Phosphatase 81 34 - 104 U/L    AST 22 13 - 39 U/L    ALT 34 7 - 52 U/L    Protein Total 7.3 6.4 - 8.9 g/dL    Albumin 4.2 3.5 - 5.7 g/dL    Bilirubin Total 1.1 (H) 0.3 - 1.0 mg/dL    GFR Estimate 89 >60 mL/min/1.73m2   CBC with platelets and differential    Collection Time:  08/17/22  7:17 AM   Result Value Ref Range    WBC Count 5.5 4.0 - 11.0 10e3/uL    RBC Count 5.25 4.40 - 5.90 10e6/uL    Hemoglobin 16.8 13.3 - 17.7 g/dL    Hematocrit 48.2 40.0 - 53.0 %    MCV 92 78 - 100 fL    MCH 32.0 26.5 - 33.0 pg    MCHC 34.9 31.5 - 36.5 g/dL    RDW 13.6 10.0 - 15.0 %    Platelet Count 159 150 - 450 10e3/uL    % Neutrophils 59 %    % Lymphocytes 25 %    % Monocytes 9 %    % Eosinophils 6 %    % Basophils 1 %    % Immature Granulocytes 0 %    NRBCs per 100 WBC 0 <1 /100    Absolute Neutrophils 3.2 1.6 - 8.3 10e3/uL    Absolute Lymphocytes 1.4 0.8 - 5.3 10e3/uL    Absolute Monocytes 0.5 0.0 - 1.3 10e3/uL    Absolute Eosinophils 0.3 0.0 - 0.7 10e3/uL    Absolute Basophils 0.1 0.0 - 0.2 10e3/uL    Absolute Immature Granulocytes 0.0 <=0.4 10e3/uL    Absolute NRBCs 0.0 10e3/uL     ASSESSMENT/Plan :    Heriberto was seen today for medicare visit.    Diagnoses and all orders for this visit:    Encounter for Medicare annual wellness exam    Coronary artery calcification seen on CT scan, Score 1504    Dilatation of thoracic aorta, 4 cm (6/20)    Hyperlipidemia, unspecified hyperlipidemia type  -     rosuvastatin (CRESTOR) 20 MG tablet; Take 1 tablet (20 mg) by mouth daily    Malignant neoplasm of prostate (H)  -     alfuzosin ER (UROXATRAL) 10 MG 24 hr tablet; TAKE 1 TABLET BY MOUTH EVERY DAY WITH FOOD    Tinnitus, bilateral    Sensorineural hearing loss (SNHL) of both ears    Grief reaction      Reviewed labs with him.  Everything is reassuring.  We will continue with Crestor.  He is allergic to aspirin.  Continue to follow-up with cardiology.  Renewed Uroxatrol.    Support and encouragement offered with regard to the loss of his son.    A total of 45 minutes was spent with the patient, reviewing records, tests, ordering medications, tests or procedures and documenting clinical information in the EHR in addition to Medicare Wellness.     Maurice Agustin MD        Answers for HPI/ROS submitted by the patient  "on 8/18/2022  In general, how would you rate your overall physical health?: excellent  Frequency of exercise:: 6-7 days/week  Do you usually eat at least 4 servings of fruit and vegetables a day, include whole grains & fiber, and avoid regularly eating high fat or \"junk\" foods? : No  Taking medications regularly:: Yes  Medication side effects:: None  Activities of Daily Living: no assistance needed  Home safety: no safety concerns identified  Hearing Impairment:: no hearing concerns  In the past 6 months, have you been bothered by leaking of urine?: No  abdominal pain: No  Blood in stool: No  Blood in urine: No  chest pain: No  chills: No  congestion: No  constipation: No  cough: No  diarrhea: No  dizziness: No  ear pain: No  eye pain: No  nervous/anxious: No  fever: No  frequency: No  genital sores: No  headaches: No  hearing loss: No  heartburn: No  arthralgias: No  joint swelling: No  peripheral edema: No  mood changes: No  myalgias: No  nausea: No  dysuria: No  palpitations: No  Skin sensation changes: No  sore throat: No  urgency: No  rash: No  shortness of breath: No  visual disturbance: No  weakness: No  impotence: No  penile discharge: No  In general, how would you rate your overall mental or emotional health?: excellent  Additional concerns today:: No  Duration of exercise:: 45-60 minutes      "

## 2022-08-18 NOTE — NURSING NOTE
"Chief Complaint   Patient presents with     Medicare Visit     annual       Initial /78   Pulse 68   Temp 98.4  F (36.9  C) (Temporal)   Resp 16   Ht 1.702 m (5' 7\")   Wt 79.7 kg (175 lb 9.6 oz)   SpO2 96%   BMI 27.50 kg/m   Estimated body mass index is 27.5 kg/m  as calculated from the following:    Height as of this encounter: 1.702 m (5' 7\").    Weight as of this encounter: 79.7 kg (175 lb 9.6 oz).  Medication Reconciliation: complete    FOOD SECURITY SCREENING QUESTIONS  Hunger Vital Signs:  Within the past 12 months we worried whether our food would run out before we got money to buy more. Never  Within the past 12 months the food we bought just didn't last and we didn't have money to get more. Never        Advance care directive on file? no  Advance care directive provided to patient? States she has one     Leanne Hargrove LPN  "

## 2022-08-19 ENCOUNTER — MYC MEDICAL ADVICE (OUTPATIENT)
Dept: FAMILY MEDICINE | Facility: OTHER | Age: 77
End: 2022-08-19

## 2022-09-03 ENCOUNTER — HEALTH MAINTENANCE LETTER (OUTPATIENT)
Age: 77
End: 2022-09-03

## 2022-10-15 DIAGNOSIS — E78.5 HYPERLIPIDEMIA, UNSPECIFIED HYPERLIPIDEMIA TYPE: ICD-10-CM

## 2022-10-15 DIAGNOSIS — C61 MALIGNANT NEOPLASM OF PROSTATE (H): ICD-10-CM

## 2022-10-17 RX ORDER — ROSUVASTATIN CALCIUM 20 MG/1
TABLET, COATED ORAL
Qty: 90 TABLET | Refills: 4 | OUTPATIENT
Start: 2022-10-17

## 2022-10-17 RX ORDER — ALFUZOSIN HYDROCHLORIDE 10 MG/1
TABLET, EXTENDED RELEASE ORAL
Qty: 90 TABLET | Refills: 4 | OUTPATIENT
Start: 2022-10-17

## 2022-10-26 DIAGNOSIS — E78.5 HYPERLIPIDEMIA, UNSPECIFIED HYPERLIPIDEMIA TYPE: ICD-10-CM

## 2022-10-26 DIAGNOSIS — C61 MALIGNANT NEOPLASM OF PROSTATE (H): ICD-10-CM

## 2022-10-28 RX ORDER — ALFUZOSIN HYDROCHLORIDE 10 MG/1
TABLET, EXTENDED RELEASE ORAL
Qty: 90 TABLET | Refills: 4 | OUTPATIENT
Start: 2022-10-28

## 2022-10-28 RX ORDER — ROSUVASTATIN CALCIUM 20 MG/1
TABLET, COATED ORAL
Qty: 90 TABLET | Refills: 4 | OUTPATIENT
Start: 2022-10-28

## 2022-10-28 NOTE — TELEPHONE ENCOUNTER
Patient returned call, he verified his last name and , and he was informed of the information below. He will call back with any further questions or concerns. No further action taken. Writer will close encounter at this time. Angelina Marcos RN .............. 10/28/2022  3:23 PM

## 2022-10-28 NOTE — TELEPHONE ENCOUNTER
rosuvastatin (CRESTOR) 20 MG tablet 90 tablet 4 8/18/2022  No   Sig - Route: Take 1 tablet (20 mg) by mouth daily      alfuzosin ER (UROXATRAL) 10 MG 24 hr tablet 90 tablet 4 8/18/2022  No   Sig: TAKE 1 TABLET BY MOUTH EVERY DAY WITH FOOD     [facility]  See refill phone note for 10/15/22.  Message was already left for patient to return call per refill note on 10/15/22    Fe Hobbs RN on 10/28/2022 at 2:47 PM

## 2022-10-28 NOTE — TELEPHONE ENCOUNTER
Patient called and his last name and  were verified. He states prescriptions were supposed to go to [facility]. He was informed of the information below, and states [facility] doesn't have the prescriptions. Pt has enough medication to get through until Monday.    Called [facility] and spoke with Tash, after verifying Pt's last name and . She states they transferred prescriptions out in July and then received new prescriptions in August, and they have not been transferred out, but are available for transfer.    [facility] and spoke with [facility], [facility]ist, after verifying Pt's last name and . She states she received refusals on 10/17, but did not receive note with last fill information and our request for transfer. She verbalized plan to call [facility] this afternoon and transfer prescriptions as requested by Pt.     Attempted reaching Pt with the above information. Left message on machine to call back. Angelina Marcos RN .............. 10/28/2022  2:31 PM

## 2022-11-17 ENCOUNTER — TELEPHONE (OUTPATIENT)
Dept: FAMILY MEDICINE | Facility: OTHER | Age: 77
End: 2022-11-17

## 2022-11-17 DIAGNOSIS — I25.10 CORONARY ARTERY CALCIFICATION SEEN ON CT SCAN: Primary | ICD-10-CM

## 2022-11-17 NOTE — TELEPHONE ENCOUNTER
Spoke with patient and he states that patient's cardiologist left the [facility] and now is at [facility]. Would like new referral to [facility]. Thank you  Hilda Camacho LPN ....................  11/17/2022   10:21 AM

## 2022-11-17 NOTE — TELEPHONE ENCOUNTER
J[facility] - Patient has questions regarding a referral.  He did not wish to elaborate with the writer of this note.      Patient states the original doctor he was referred to is no longer with the Health system.  Patient is wanting to know if he should stay with the system or stay with the doctor.    Vira Stephenson on 11/17/2022 at 9:45 AM

## 2023-01-01 ENCOUNTER — OFFICE VISIT (OUTPATIENT)
Dept: FAMILY MEDICINE | Facility: OTHER | Age: 78
End: 2023-01-01
Attending: FAMILY MEDICINE
Payer: COMMERCIAL

## 2023-01-01 ENCOUNTER — MYC MEDICAL ADVICE (OUTPATIENT)
Dept: FAMILY MEDICINE | Facility: OTHER | Age: 78
End: 2023-01-01
Payer: COMMERCIAL

## 2023-01-01 ENCOUNTER — ALLIED HEALTH/NURSE VISIT (OUTPATIENT)
Dept: FAMILY MEDICINE | Facility: OTHER | Age: 78
End: 2023-01-01
Attending: NURSE PRACTITIONER
Payer: COMMERCIAL

## 2023-01-01 ENCOUNTER — PRE VISIT (OUTPATIENT)
Dept: OTOLARYNGOLOGY | Facility: CLINIC | Age: 78
End: 2023-01-01

## 2023-01-01 VITALS
RESPIRATION RATE: 14 BRPM | WEIGHT: 180.8 LBS | HEART RATE: 87 BPM | BODY MASS INDEX: 28.38 KG/M2 | HEIGHT: 67 IN | TEMPERATURE: 97.5 F | OXYGEN SATURATION: 97 % | SYSTOLIC BLOOD PRESSURE: 128 MMHG | DIASTOLIC BLOOD PRESSURE: 74 MMHG

## 2023-01-01 DIAGNOSIS — I77.810 DILATATION OF THORACIC AORTA (H): ICD-10-CM

## 2023-01-01 DIAGNOSIS — M19.91 PRIMARY OSTEOARTHRITIS, UNSPECIFIED SITE: ICD-10-CM

## 2023-01-01 DIAGNOSIS — D75.1 POLYCYTHEMIA: ICD-10-CM

## 2023-01-01 DIAGNOSIS — E78.5 HYPERLIPIDEMIA, UNSPECIFIED HYPERLIPIDEMIA TYPE: ICD-10-CM

## 2023-01-01 DIAGNOSIS — C61 MALIGNANT NEOPLASM OF PROSTATE (H): ICD-10-CM

## 2023-01-01 DIAGNOSIS — I25.10 CORONARY ARTERY CALCIFICATION SEEN ON CT SCAN: ICD-10-CM

## 2023-01-01 DIAGNOSIS — Z20.822 COVID-19 RULED OUT: Primary | ICD-10-CM

## 2023-01-01 DIAGNOSIS — R73.09 ELEVATED GLUCOSE: ICD-10-CM

## 2023-01-01 DIAGNOSIS — Z00.00 ENCOUNTER FOR MEDICARE ANNUAL WELLNESS EXAM: Primary | ICD-10-CM

## 2023-01-01 LAB
ALBUMIN SERPL BCG-MCNC: 4.3 G/DL (ref 3.5–5.2)
ALP SERPL-CCNC: 163 U/L (ref 40–150)
ALT SERPL W P-5'-P-CCNC: 67 U/L (ref 0–70)
ANION GAP SERPL CALCULATED.3IONS-SCNC: 8 MMOL/L (ref 7–15)
AST SERPL W P-5'-P-CCNC: 77 U/L (ref 0–45)
BASOPHILS # BLD AUTO: 0.1 10E3/UL (ref 0–0.2)
BASOPHILS NFR BLD AUTO: 1 %
BILIRUB SERPL-MCNC: 1.1 MG/DL
BUN SERPL-MCNC: 16 MG/DL (ref 8–23)
CALCIUM SERPL-MCNC: 10.3 MG/DL (ref 8.8–10.2)
CHLORIDE SERPL-SCNC: 102 MMOL/L (ref 98–107)
CHOLEST SERPL-MCNC: 137 MG/DL
CREAT SERPL-MCNC: 0.79 MG/DL (ref 0.67–1.17)
DEPRECATED HCO3 PLAS-SCNC: 28 MMOL/L (ref 22–29)
EGFRCR SERPLBLD CKD-EPI 2021: 90 ML/MIN/1.73M2
EOSINOPHIL # BLD AUTO: 0.2 10E3/UL (ref 0–0.7)
EOSINOPHIL NFR BLD AUTO: 3 %
ERYTHROCYTE [DISTWIDTH] IN BLOOD BY AUTOMATED COUNT: 13.4 % (ref 10–15)
GLUCOSE SERPL-MCNC: 105 MG/DL (ref 70–99)
HBA1C MFR BLD: 5.2 % (ref 4–6.2)
HCT VFR BLD AUTO: 53.5 % (ref 40–53)
HDLC SERPL-MCNC: 40 MG/DL
HGB BLD-MCNC: 18 G/DL (ref 13.3–17.7)
IMM GRANULOCYTES # BLD: 0 10E3/UL
IMM GRANULOCYTES NFR BLD: 0 %
LDLC SERPL CALC-MCNC: 74 MG/DL
LYMPHOCYTES # BLD AUTO: 1.2 10E3/UL (ref 0.8–5.3)
LYMPHOCYTES NFR BLD AUTO: 18 %
MCH RBC QN AUTO: 30.7 PG (ref 26.5–33)
MCHC RBC AUTO-ENTMCNC: 33.6 G/DL (ref 31.5–36.5)
MCV RBC AUTO: 91 FL (ref 78–100)
MONOCYTES # BLD AUTO: 0.6 10E3/UL (ref 0–1.3)
MONOCYTES NFR BLD AUTO: 9 %
NEUTROPHILS # BLD AUTO: 4.5 10E3/UL (ref 1.6–8.3)
NEUTROPHILS NFR BLD AUTO: 69 %
NONHDLC SERPL-MCNC: 97 MG/DL
NRBC # BLD AUTO: 0 10E3/UL
NRBC BLD AUTO-RTO: 0 /100
PLATELET # BLD AUTO: 146 10E3/UL (ref 150–450)
POTASSIUM SERPL-SCNC: 4.2 MMOL/L (ref 3.4–5.3)
PROT SERPL-MCNC: 7.6 G/DL (ref 6.4–8.3)
PSA SERPL DL<=0.01 NG/ML-MCNC: 0.11 NG/ML (ref 0–6.5)
RBC # BLD AUTO: 5.86 10E6/UL (ref 4.4–5.9)
SARS-COV-2 RNA RESP QL NAA+PROBE: NEGATIVE
SODIUM SERPL-SCNC: 138 MMOL/L (ref 135–145)
TRIGL SERPL-MCNC: 117 MG/DL
WBC # BLD AUTO: 6.5 10E3/UL (ref 4–11)

## 2023-01-01 PROCEDURE — 85025 COMPLETE CBC W/AUTO DIFF WBC: CPT | Mod: ZL | Performed by: FAMILY MEDICINE

## 2023-01-01 PROCEDURE — G0463 HOSPITAL OUTPT CLINIC VISIT: HCPCS

## 2023-01-01 PROCEDURE — C9803 HOPD COVID-19 SPEC COLLECT: HCPCS

## 2023-01-01 PROCEDURE — 80053 COMPREHEN METABOLIC PANEL: CPT | Mod: ZL | Performed by: FAMILY MEDICINE

## 2023-01-01 PROCEDURE — 99214 OFFICE O/P EST MOD 30 MIN: CPT | Mod: 25 | Performed by: FAMILY MEDICINE

## 2023-01-01 PROCEDURE — 36415 COLL VENOUS BLD VENIPUNCTURE: CPT | Mod: ZL | Performed by: FAMILY MEDICINE

## 2023-01-01 PROCEDURE — 87635 SARS-COV-2 COVID-19 AMP PRB: CPT | Mod: ZL

## 2023-01-01 PROCEDURE — 84153 ASSAY OF PSA TOTAL: CPT | Mod: ZL | Performed by: FAMILY MEDICINE

## 2023-01-01 PROCEDURE — G0439 PPPS, SUBSEQ VISIT: HCPCS | Performed by: FAMILY MEDICINE

## 2023-01-01 PROCEDURE — 80061 LIPID PANEL: CPT | Mod: ZL | Performed by: FAMILY MEDICINE

## 2023-01-01 PROCEDURE — 83036 HEMOGLOBIN GLYCOSYLATED A1C: CPT | Mod: ZL | Performed by: FAMILY MEDICINE

## 2023-01-01 RX ORDER — ROSUVASTATIN CALCIUM 20 MG/1
20 TABLET, COATED ORAL DAILY
Qty: 90 TABLET | Refills: 4 | Status: SHIPPED | OUTPATIENT
Start: 2023-01-01

## 2023-01-01 RX ORDER — ALFUZOSIN HYDROCHLORIDE 10 MG/1
TABLET, EXTENDED RELEASE ORAL
Qty: 90 TABLET | Refills: 4 | Status: SHIPPED | OUTPATIENT
Start: 2023-01-01

## 2023-01-01 ASSESSMENT — ACTIVITIES OF DAILY LIVING (ADL): CURRENT_FUNCTION: NO ASSISTANCE NEEDED

## 2023-01-01 ASSESSMENT — ENCOUNTER SYMPTOMS
ARTHRALGIAS: 0
FREQUENCY: 1
WEAKNESS: 0
COUGH: 0
DYSURIA: 0
DIZZINESS: 0
HEMATOCHEZIA: 0
SHORTNESS OF BREATH: 0
HEARTBURN: 0
PARESTHESIAS: 1
CONSTIPATION: 0
JOINT SWELLING: 0
FEVER: 0
MYALGIAS: 0
HEMATURIA: 0
NAUSEA: 0
ABDOMINAL PAIN: 0
SORE THROAT: 0
EYE PAIN: 0
PALPITATIONS: 0
DIARRHEA: 0
NERVOUS/ANXIOUS: 0
CHILLS: 0

## 2023-01-01 ASSESSMENT — PAIN SCALES - GENERAL: PAINLEVEL: MILD PAIN (3)

## 2023-01-17 ENCOUNTER — TRANSFERRED RECORDS (OUTPATIENT)
Dept: HEALTH INFORMATION MANAGEMENT | Facility: OTHER | Age: 78
End: 2023-01-17
Payer: COMMERCIAL

## 2023-01-17 ENCOUNTER — OFFICE VISIT (OUTPATIENT)
Dept: OTOLARYNGOLOGY | Facility: OTHER | Age: 78
End: 2023-01-17
Attending: INTERNAL MEDICINE
Payer: COMMERCIAL

## 2023-01-17 DIAGNOSIS — H90.5 SENSORINEURAL HEARING LOSS (SNHL), UNSPECIFIED LATERALITY: ICD-10-CM

## 2023-01-17 DIAGNOSIS — H93.13 TINNITUS, BILATERAL: Primary | ICD-10-CM

## 2023-01-17 PROCEDURE — G0463 HOSPITAL OUTPT CLINIC VISIT: HCPCS

## 2023-01-17 NOTE — NURSING NOTE
Patient here to see ENT for Medical clearance for headache   Nohelia Maradiaga LPN ..........1/17/2023 12:51 PM

## 2023-01-23 NOTE — PROGRESS NOTES
document embedded image  Patient Name: Heriberto Noriega    Address: [address] [place] [address]     YOB: 1945    [place] [id number]    MR Number: [id number]    Phone: [phone]  PCP: Maurice Agustin MD            Appointment Date: 01/17/23   Visit Provider: Florentino Payne MD    cc: Maurice Agustin MD; ~    ENT Progress Note  Intake  Visit Reasons: Medical clearance for HA's    HPI  History of Present Illness  Chief complaint:  Progressive hearing loss and tinnitus    History  The patient is a 77-year-old nasal  who wears [employer] issued hearing aids.  He is on his 3rd set of hearing aids.  He is believes he has been using them for 10-15 years now.  He is very happy with the aids.  He is getting the point where he is not catching much if he does not have them in.  He is had some progression of his hearing loss worse in the right ear as last audiogram in his audiologist wanted him seem to rule out retrocochlear pathology.  He has no dizziness or vertigo.  He is speech reception threshold of 65-each ear with a discrimination occasion score of 72% on the right and 40% on the left.    Exam  The external auditory canals and TMs are clear bilaterally  Remainder of the head neck exam is unremarkable  Audiogram as above    A&P  Assessment & Plan  (1) Tinnitus of both ears:        Status: Acute        Code(s):  H93.13 - Tinnitus, bilateral  (2) Sensorineural hearing loss (SNHL):        Status: Acute        Code(s):  H90.5 - Unspecified sensorineural hearing loss    Plan  Would have a very low concern for retrocochlear pathology given his long history of progressive hearing loss.  I do not think an MRI is warranted.  Was counseled that his changing tinnitus is consistent with his hearing loss.  We briefly discussed the possibility of cochlear implant as his hearing worsens.      Florentino Payne MD    01/17/23 [id number]    <Electronically signed by Florentino Payne MD> 01/18/23 [id number]

## 2023-02-01 NOTE — PATIENT INSTRUCTIONS
Patient Education   Personalized Prevention Plan  You are due for the preventive services outlined below.  Your care team is available to assist you in scheduling these services.  If you have already completed any of these items, please share that information with your care team to update in your medical record.  Health Maintenance Due   Topic Date Due     Hepatitis C Screening  Never done     FALL RISK ASSESSMENT  07/01/2021         Shortness of breath.  Per chart, "Patient is a 62y female with Myasthenia gravis on pyrostigmine, developed cough, sob, tested positive for covid at home. Started 20mg of prednisone but continued to be very sob so came to hospital. She is now needing bipap and desats very low when it is taken off. She has low grade  fever, I cannot get any info from her right now due to respiratory distress on bipap. She tends to have mg flares every couple of years, last one she was at [facility] and required intubation and course was complicated by aspiration pneumonia." Shortness of breath.  Per chart, "Patient is a 62y female with Myasthenia gravis on pyrostigmine, developed cough, sob, tested positive for covid at home. Started 20mg of prednisone but continued to be very sob so came to hospital. She is now needing bipap and desats very low when it is taken off. She has low grade  fever, I cannot get any info from her right now due to respiratory distress on bipap. She tends to have mg flares every couple of years, last one she was at [facility] and required intubation and course was complicated by aspiration pneumonia." Shortness of breath.  Per chart, "Patient is a 62y female with Myasthenia gravis on pyrostigmine, developed cough, sob, tested positive for covid at home. Started 20mg of prednisone but continued to be very sob so came to hospital. She is now needing bipap and desats very low when it is taken off. She has low grade  fever, I cannot get any info from her right now due to respiratory distress on bipap. She tends to have mg flares every couple of years, last one she was at [facility] and required intubation and course was complicated by aspiration pneumonia." Shortness of breath.  Per chart, "Patient is a 62y female with Myasthenia gravis on pyrostigmine, developed cough, sob, tested positive for covid at home. Started 20mg of prednisone but continued to be very sob so came to hospital. She is now needing bipap and desats very low when it is taken off. She has low grade  fever, I cannot get any info from her right now due to respiratory distress on bipap. She tends to have mg flares every couple of years, last one she was at [facility] and required intubation and course was complicated by aspiration pneumonia." Of note, pt s/p tracheal intubation this AM, 02-01 (per chart).  Shortness of breath.  Per chart, "Patient is a 62y female with Myasthenia gravis on pyrostigmine, developed cough, sob, tested positive for covid at home. Started 20mg of prednisone but continued to be very sob so came to hospital. She is now needing bipap and desats very low when it is taken off. She has low grade  fever, I cannot get any info from her right now due to respiratory distress on bipap. She tends to have mg flares every couple of years, last one she was at [facility] and required intubation and course was complicated by aspiration pneumonia." Of note, pt s/p tracheal intubation this AM, 02-01 (per chart).  Shortness of breath.  Per chart, "Patient is a 62y female with Myasthenia gravis on pyrostigmine, developed cough, sob, tested positive for covid at home. Started 20mg of prednisone but continued to be very sob so came to hospital. She is now needing bipap and desats very low when it is taken off. She has low grade  fever, I cannot get any info from her right now due to respiratory distress on bipap. She tends to have mg flares every couple of years, last one she was at [facility] and required intubation and course was complicated by aspiration pneumonia." Of note, pt s/p tracheal intubation this AM, 02-01 (per chart).

## 2023-03-14 ENCOUNTER — OFFICE VISIT (OUTPATIENT)
Dept: FAMILY MEDICINE | Facility: OTHER | Age: 78
End: 2023-03-14
Attending: FAMILY MEDICINE
Payer: COMMERCIAL

## 2023-03-14 VITALS
DIASTOLIC BLOOD PRESSURE: 86 MMHG | RESPIRATION RATE: 16 BRPM | HEART RATE: 68 BPM | TEMPERATURE: 96.9 F | OXYGEN SATURATION: 97 % | WEIGHT: 193 LBS | SYSTOLIC BLOOD PRESSURE: 128 MMHG | HEIGHT: 67 IN | BODY MASS INDEX: 30.29 KG/M2

## 2023-03-14 DIAGNOSIS — H93.11 TINNITUS, RIGHT: ICD-10-CM

## 2023-03-14 DIAGNOSIS — H90.3 SENSORINEURAL HEARING LOSS (SNHL) OF BOTH EARS: Primary | ICD-10-CM

## 2023-03-14 DIAGNOSIS — C61 MALIGNANT NEOPLASM OF PROSTATE (H): ICD-10-CM

## 2023-03-14 DIAGNOSIS — I25.10 CORONARY ARTERY CALCIFICATION SEEN ON CT SCAN: ICD-10-CM

## 2023-03-14 PROCEDURE — 99214 OFFICE O/P EST MOD 30 MIN: CPT | Performed by: FAMILY MEDICINE

## 2023-03-14 PROCEDURE — G0463 HOSPITAL OUTPT CLINIC VISIT: HCPCS

## 2023-03-14 ASSESSMENT — PAIN SCALES - GENERAL: PAINLEVEL: NO PAIN (0)

## 2023-03-14 NOTE — NURSING NOTE
"Chief Complaint   Patient presents with     RECHECK     After seeing the ENT       Initial /86   Pulse 68   Temp 96.9  F (36.1  C) (Temporal)   Resp 16   Ht 1.695 m (5' 6.75\")   Wt 87.5 kg (193 lb)   SpO2 97%   BMI 30.45 kg/m   Estimated body mass index is 30.45 kg/m  as calculated from the following:    Height as of this encounter: 1.695 m (5' 6.75\").    Weight as of this encounter: 87.5 kg (193 lb).  Medication Reconciliation: complete    FOOD SECURITY SCREENING QUESTIONS  Hunger Vital Signs:  Within the past 12 months we worried whether our food would run out before we got money to buy more. Never  Within the past 12 months the food we bought just didn't last and we didn't have money to get more. Never        Advance care directive on file? yes      Leanne Hargrove, AMSON  "

## 2023-03-14 NOTE — PROGRESS NOTES
"      Yani Louis is a 77 year old, presenting for the following health issues:  RECHECK (After seeing the ENT)      History of Present Illness       Reason for visit:  Follow up visit for hearing    He eats 0-1 servings of fruits and vegetables daily.He consumes 0 sweetened beverage(s) daily.He exercises with enough effort to increase his heart rate 60 or more minutes per day.  He exercises with enough effort to increase his heart rate 7 days per week.   He is taking medications regularly.             Review of Systems         Objective    /86   Pulse 68   Temp 96.9  F (36.1  C) (Temporal)   Resp 16   Ht 1.695 m (5' 6.75\")   Wt 87.5 kg (193 lb)   SpO2 97%   BMI 30.45 kg/m    Body mass index is 30.45 kg/m .  Physical Exam                       "

## 2023-03-14 NOTE — PROGRESS NOTES
"Nursing Notes:   Leanne Hargrove LPN  3/14/2023  8:51 AM  Signed  Chief Complaint   Patient presents with     RECHECK     After seeing the ENT       Initial /86   Pulse 68   Temp 96.9  F (36.1  C) (Temporal)   Resp 16   Ht 1.695 m (5' 6.75\")   Wt 87.5 kg (193 lb)   SpO2 97%   BMI 30.45 kg/m   Estimated body mass index is 30.45 kg/m  as calculated from the following:    Height as of this encounter: 1.695 m (5' 6.75\").    Weight as of this encounter: 87.5 kg (193 lb).  Medication Reconciliation: complete    FOOD SECURITY SCREENING QUESTIONS  Hunger Vital Signs:  Within the past 12 months we worried whether our food would run out before we got money to buy more. Never  Within the past 12 months the food we bought just didn't last and we didn't have money to get more. Never        Advance care directive on file? yes      Leanne Hargrove LPN    SUBJECTIVE:  Heriberto Noriega  is a 77 year old male who comes in today for follow-up.  He saw Dr. Payne in January for evaluation to rule out retrocochlear pathology as a cause of his hearing loss.  [facility]  He has had hearing aids for 10 to 15 years and has been happy with them.  He did not think that an MRI was warranted and counseled him that his changing tinnitus was consistent with his hearing loss. They briefly discussed the possibility of a cochlear implant if his hearing continues to worsen.    Heriberto had a change in his tinnitus.  His audiologist was quite concerned about that.  He has had a high pitched bilateral tinnitus for 60 years. He has a pulsatile distant chain saw sound that is only on the right and started last summer.  It has gradually worsened.     He has had some trouble with binocular diplopia and has been seeing the eye doctor. They gave him a prism in his glasses.  He had some superior oblique fatigue.  He will have some nasal drainage from the right nare.     Heriberto had some concerns as he felt that " the consultation with Dr. Payne was a bit cursory and that he had not really look at all his information and felt that he was somewhat dismissive.  We discussed all the details that were concerning to him and discussed referring him for second opinion.    He saw Dr. Prado for cardiology follow-up in December.  He had a markedly elevated calcium score but normal ventricular function.  He has an aspirin allergy so cannot take that but continues on Crestor 20 mg nightly to help stabilize plaque and prevent heart attack and stroke.  They discussed healthy diet and exercise.    He has a history of prostate cancer.  His last PSA was 0.016 months ago.  We have been checking it annually in August at his annual exam.    He is up-to-date on all his immunizations.    Past Medical, Family, and Social History reviewed and updated as noted below.   ROS is negative except as noted above       Allergies   Allergen Reactions     Aspirin Hives   ,   Family History   Problem Relation Age of Onset     Heart Disease Father          from CHF     Breast Cancer Mother          from Breast Cancer     Other - See Comments Brother         Agent orange exposure     Breast Cancer Sister      Heart Disease Brother         Heart Disease,cardiac surgery, CABG     Other - See Comments Brother          polio     Cancer Brother         Cancer, from lung cancer   ,   Current Outpatient Medications   Medication     alfuzosin ER (UROXATRAL) 10 MG 24 hr tablet     co-enzyme Q-10 50 MG CAPS     meclizine (ANTIVERT) 12.5 MG tablet     rosuvastatin (CRESTOR) 20 MG tablet     scopolamine (TRANSDERM) 1 MG/3DAYS 72 hr patch     Probiotic Product ([employer] PROBIOTIC) CAPS     No current facility-administered medications for this visit.   ,   Past Medical History:   Diagnosis Date     Cholelithiasis      Coronary artery calcification      Dietary counseling and surveillance     Low carbohydrate diet.     Dorsalgia     2005,with radicular  symptoms     Enlarged prostate without lower urinary tract symptoms (luts)     Benign with mild urinary obstruction symptoms.     Hyperlipidemia      Malignant neoplasm of prostate (H)     2011,He completed radiation treatment for prostate cancer on .  Initially had Radioactive palladium seed implants done followed by external beam radiation     Secondary polycythemia     3/29/2017   ,   Patient Active Problem List    Diagnosis Date Noted     Dilatation of thoracic aorta, 4 cm () 2020     Priority: Medium     Coronary artery calcification seen on CT scan, Score 1504 2018     Priority: Medium     Chest pain 2018     Priority: Medium     Obesity 2018     Priority: Medium     Polycythemia 2017     Priority: Medium     Counseling regarding advanced directives and goals of care 07/10/2015     Priority: Medium     Overview:   Wants only comfort cares if not cognitively intact. Does not want to go to skilled nursing facility.       ACP (advance care planning) 12/10/2013     Priority: Medium     Malignant neoplasm of prostate (H) 2011     Priority: Medium     Overview:   Completed radiation therapy in 2012 at [facility]       Hyperlipidemia 10/04/2011     Priority: Medium     Hearing loss, sensorineural 11/10/2009     Priority: Medium     Osteoarthrosis 11/10/2009     Priority: Medium     Seborrheic keratosis 11/10/2009     Priority: Medium     Tinnitus 11/10/2009     Priority: Medium   ,   Past Surgical History:   Procedure Laterality Date     COLONOSCOPY       Hyperplastic polyp at 20 cm.     COLONOSCOPY      05,next colonoscopy due in      COLONOSCOPY  2015    Follow up 10 years 2025, hyperplastic     PROSTATE SURGERY      Seeds and radiation    and   Social History     Tobacco Use     Smoking status: Former     Types: Cigarettes     Quit date: 1979     Years since quittin.2     Smokeless tobacco: Never  "  Substance Use Topics     Alcohol use: Yes     Comment: very rare     OBJECTIVE:  /86   Pulse 68   Temp 96.9  F (36.1  C) (Temporal)   Resp 16   Ht 1.695 m (5' 6.75\")   Wt 87.5 kg (193 lb)   SpO2 97%   BMI 30.45 kg/m     EXAM:  Alert and cooperative, no distress.  Affect is broad ranging and appropriate.  Bilateral hearing aids.  Exam was not performed today.  ASSESSMENT/Plan :    Heriberto was seen today for recheck.    Diagnoses and all orders for this visit:    Sensorineural hearing loss (SNHL) of both ears  -     Adult ENT  Referral; Future  -     MR Internal Auditory Canal (IAC) w/o & w Contrast; Future    Coronary artery calcification seen on CT scan, Score 1504    Malignant neoplasm of prostate (H)    Tinnitus, right  -     Adult ENT  Referral; Future  -     MR Internal Auditory Canal (IAC) w/o & w Contrast; Future      After discussion, I think a second opinion from ENT is not a bad idea and referral sent for same.  Because of his constellation of symptoms including the clear rhinorrhea from his right nose, the diplopia with a question of superior oblique palsy, and his change in tinnitus with hearing loss and intermittent vertigo, we discussed whether not to do an MRI or leave that to the specialist after ENT consultation.  We elected to go ahead and gather the information and I with and without contrast of the IACs was ordered.  Will notify of results when available and await ENT referral assuming the imaging is unrevealing.    A total of 31 minutes was spent with the patient, reviewing records, tests, ordering medications, tests or procedures and documenting clinical information in the EHR.     Maurice Agustin MD    Answers for HPI/ROS submitted by the patient on 3/14/2023  What is the reason for your visit today? : follow up visit for hearing  How many servings of fruits and vegetables do you eat daily?: 0-1  On average, how many sweetened beverages do you drink each day " (Examples: soda, juice, sweet tea, etc.  Do NOT count diet or artificially sweetened beverages)?: 0  How many minutes a day do you exercise enough to make your heart beat faster?: 60 or more  How many days a week do you exercise enough to make your heart beat faster?: 7  How many days per week do you miss taking your medication?: 0

## 2023-03-15 ENCOUNTER — TRANSCRIBE ORDERS (OUTPATIENT)
Dept: OTHER | Age: 78
End: 2023-03-15

## 2023-03-15 DIAGNOSIS — H90.3 SENSORINEURAL HEARING LOSS (SNHL) OF BOTH EARS: Primary | ICD-10-CM

## 2023-03-15 DIAGNOSIS — H93.11 TINNITUS, RIGHT: ICD-10-CM

## 2023-03-24 ENCOUNTER — MYC MEDICAL ADVICE (OUTPATIENT)
Dept: FAMILY MEDICINE | Facility: OTHER | Age: 78
End: 2023-03-24
Payer: COMMERCIAL

## 2023-03-24 ENCOUNTER — HOSPITAL ENCOUNTER (OUTPATIENT)
Dept: MRI IMAGING | Facility: OTHER | Age: 78
Discharge: HOME | End: 2023-03-24
Attending: FAMILY MEDICINE | Admitting: FAMILY MEDICINE
Payer: COMMERCIAL

## 2023-03-24 DIAGNOSIS — H93.11 TINNITUS, RIGHT: ICD-10-CM

## 2023-03-24 DIAGNOSIS — H90.3 SENSORINEURAL HEARING LOSS (SNHL) OF BOTH EARS: ICD-10-CM

## 2023-03-24 PROCEDURE — A9575 INJ GADOTERATE MEGLUMI 0.1ML: HCPCS | Performed by: FAMILY MEDICINE

## 2023-03-24 PROCEDURE — 70543 MRI ORBT/FAC/NCK W/O &W/DYE: CPT

## 2023-03-24 PROCEDURE — 255N000002 HC RX 255 OP 636: Performed by: FAMILY MEDICINE

## 2023-03-24 RX ADMIN — GADOTERATE MEGLUMINE 18 ML: 376.9 INJECTION INTRAVENOUS at 14:23

## 2023-04-19 NOTE — TELEPHONE ENCOUNTER
FUTURE VISIT INFORMATION:      FUTURE VISIT INFORMATION:    Date: 6/27/23    Time: 1:15 PM    Location: [facility]  REFERRAL INFORMATION:    Referring provider: Maurice Agustin MD    Referring providers clinic: [facility] and [facility]     Reason for visit/diagnosis:  appt per pt/ref, Sensorineural hearing loss (SNHL) of both ears [H90.3] Tinnitus, right [H93.11], ref prov: Maurice Agustin MD, recs/ref in [facility], confirmed [facility] location    RECORDS REQUESTED FROM:       Clinic name Comments Records Status Imaging Status   [facility]  3/14/23 note -Maurice Agustin MD   [facility]    Imaging MR IAC 3/24/23  MR head brain 3/29/17 [facility] Pacs   [facility] ENT 1/17/23 and 3/30/21 note- Florentino Payne MD  *request for more recs 5/3/23  *no additional recs @ facility 5/9/23 Scanned in [facility] 3/23/21 audiogram -- no recs @ [facility]    *pending req for recs    * Audio progress note 10/2010 - 8/2019 RECEIVED   req 5/3/23  5/24, 6/1    RECEIVED send to scan 6/2       May 3, 2023 at 12:49 PM - req recs at [facility] and [facility]. Images were done with [facility] MRN: [id number] but is already in PACS-daniella    May 9, 2023 at 10:37 AM - Received from [facility] duplicate 1/17/23 records and 3/30/21 audio note. -Daniella    May 18, 2023 at 11:51 AM - Called [facility] and spoke to Geoff and he said the request was received today and is being work on. Facility will fax over soon -Daniella    May 24, 2023 at 6:49 AM - Received faxed from [facility] no records of patient, however, [facility] at the [facility]. Called and LM with CB number and also send a STAT request for 3/2021 audiogram/ recs-Daniella    June 1, 2023 at 8:59 AM -  Called [facility] to follow up request send on 5/24 and spoke to Herlinda who states only 2 people in MR working on request. If it is not being assigned to Herlinda she is unable to process. Request to resend request as it takes 48 hours for  "processing. 3rd request send noting \"STAT\"-Daniella  * UPDATE 10 AM- Herlinda from [facility] called to let me know that patient did not have any audiogram done, unable to find recs for 3/23/21. There is only progress notes with Audiology and those recs are sent. Patient seen from [phone]. Records received send to jesús Humphreys        "

## 2023-05-24 DIAGNOSIS — H90.5 SENSORINEURAL HEARING LOSS (SNHL), UNSPECIFIED LATERALITY: Primary | ICD-10-CM

## 2023-09-07 NOTE — TELEPHONE ENCOUNTER
Last Prescription Date: 8/18/2022  Last Qty/Refills: 90 / R-4  Last Office Visit: 3/14/2023  Future Office Visit: None     Requested Prescriptions   Pending Prescriptions Disp Refills    rosuvastatin (CRESTOR) 20 MG tablet [Pharmacy Med Name: ROSUVASTATIN CALCIUM 20 MG TAB] 90 tablet 4     Sig: TAKE 1 TABLET BY MOUTH EVERY DAY       Statins Protocol Failed - 9/1/2023 12:58 AM        Failed - LDL on file in past 12 months     Recent Labs   Lab Test 08/17/22  0717   LDL 67          Last Prescription Date: 8/18/2022  Last Qty/Refills: 90 / R-4  Last Office Visit: 3/14/2023  Future Office Visit: None      alfuzosin ER (UROXATRAL) 10 MG 24 hr tablet [Pharmacy Med Name: ALFUZOSIN HCL ER 10 MG TABLET] 90 tablet 4     Sig: TAKE ONE TABLET BY MOUTH DAILY WITH FOOD       Alpha Blockers Passed - 9/1/2023 12:58 AM     Adelita Dia RN on 9/7/2023 at 12:16 PM

## 2023-09-16 NOTE — PROGRESS NOTES
Patient was exposed to covid and is going out of [place] tomorrow. He is requesting a covid test.  Korin FRAGA CMA...9/16/2023 10:40 AM

## 2023-11-15 NOTE — PATIENT INSTRUCTIONS
Patient Education   Personalized Prevention Plan  You are due for the preventive services outlined below.  Your care team is available to assist you in scheduling these services.  If you have already completed any of these items, please share that information with your care team to update in your medical record.  There are no preventive care reminders to display for this patient.

## 2023-11-15 NOTE — NURSING NOTE
Patient presents to clinic for medicare wellness visit. He states he has right sided flank pain and he thinks it is from working on his truck bending over.  Hilda Camacho LPN ....................  11/15/2023   8:19 AM  EXT [id number]

## 2023-11-15 NOTE — PROGRESS NOTES
Nursing Notes:   Hilda Camacho LPN  11/15/2023  8:26 AM  Addendum  Patient presents to clinic for medicare wellness visit. He states he has right sided flank pain and he thinks it is from working on his truck bending over.  Hilda Camacho LPN ....................  11/15/2023   8:19 AM  EXT [id number]    SUBJECTIVE:  Heriberto Noriega  is a 78 year old male who comes in for Medicare wellness and follow-up of his other medical problems.    He has hyperlipidemia and high calcium score consistent with coronary disease.  He had a normal stress echo 5 years ago.  He had negative AAA screening 4 years ago.  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  He saw Dr. Prado from cardiology last .  He continues on Crestor 20 mg daily for plaque stabilization to reduce his risk of heart attack and stroke by 30% or more.  His echocardiogram shows a normal ejection fraction.  He has an aspirin allergy.      He has a history in the past of prostate cancer and his PSA has been low and stable.     He is up-to-date on health maintenance issues.  He had his COVID and flu shots this fall and also had RSV.    His wife has atrial fib and had had trouble of late.  She is getting squared away. They are no longer travelling in the winter.     Past Medical, Family, and Social History reviewed and updated as noted below.   ROS is negative except as noted above       Allergies   Allergen Reactions    Aspirin Hives   ,   Family History   Problem Relation Age of Onset    Heart Disease Father          from CHF    Breast Cancer Mother          from Breast Cancer    Other - See Comments Brother         Agent orange exposure    Breast Cancer Sister     Heart Disease Brother         Heart Disease,cardiac surgery, CABG    Other - See Comments Brother          polio    Cancer Brother         Cancer, from lung cancer   ,   Current Outpatient Medications   Medication    alfuzosin ER (UROXATRAL) 10 MG 24 hr tablet     co-enzyme Q-10 50 MG CAPS    meclizine (ANTIVERT) 12.5 MG tablet    rosuvastatin (CRESTOR) 20 MG tablet    scopolamine (TRANSDERM) 1 MG/3DAYS 72 hr patch     No current facility-administered medications for this visit.   ,   Past Medical History:   Diagnosis Date    Cholelithiasis     Coronary artery calcification     Dietary counseling and surveillance     Low carbohydrate diet.    Dorsalgia     12/13/2005,with radicular symptoms    Enlarged prostate without lower urinary tract symptoms (luts)     Benign with mild urinary obstruction symptoms.    Hyperlipidemia     Malignant neoplasm of prostate (H)     11/21/2011,He completed radiation treatment for prostate cancer on August 17 of 2012.  Initially had Radioactive palladium seed implants done followed by external beam radiation    Secondary polycythemia     3/29/2017   ,   Patient Active Problem List    Diagnosis Date Noted    Dilatation of thoracic aorta, 4 cm (6/20) 07/01/2020     Priority: Medium    Coronary artery calcification seen on CT scan, Score 1504 06/07/2018     Priority: Medium    Chest pain 05/24/2018     Priority: Medium    Obesity 02/02/2018     Priority: Medium    Polycythemia 03/29/2017     Priority: Medium    Counseling regarding advanced directives and goals of care 07/10/2015     Priority: Medium     Overview:   Wants only comfort cares if not cognitively intact. Does not want to go to skilled nursing facility.      ACP (advance care planning) 12/10/2013     Priority: Medium    Malignant neoplasm of prostate (H) 11/21/2011     Priority: Medium     Overview:   Completed radiation therapy in August of 2012 at [facility]      Hyperlipidemia 10/04/2011     Priority: Medium    Hearing loss, sensorineural 11/10/2009     Priority: Medium    Osteoarthrosis 11/10/2009     Priority: Medium    Seborrheic keratosis 11/10/2009     Priority: Medium    Tinnitus 11/10/2009     Priority: Medium   ,   Past Surgical History:   Procedure Laterality Date     "COLONOSCOPY       Hyperplastic polyp at 20 cm.    COLONOSCOPY      05,next colonoscopy due in     COLONOSCOPY  2015    Follow up 10 years 2025, hyperplastic    PROSTATE SURGERY      Seeds and radiation    and   Social History     Tobacco Use    Smoking status: Former     Types: Cigarettes     Quit date: 1979     Years since quittin.9    Smokeless tobacco: Never   Substance Use Topics    Alcohol use: Yes     Comment: very rare     OBJECTIVE:  /74   Pulse 87   Temp 97.5  F (36.4  C)   Resp 14   Ht 1.695 m (5' 6.75\")   Wt 82 kg (180 lb 12.8 oz)   SpO2 97%   BMI 28.53 kg/m     EXAM:  General Appearance: Pleasant, alert, appropriate appearance for age. No acute distress  Head Exam: Normal. Normocephalic, atraumatic.  Eye Exam:  Normal external eyes, conjunctivae, lids, cornea. LAURITA. EOMI  Ear Exam: Normal TM's bilaterally. Normal auditory canals and external ears. Non-tender.  Nose Exam: Normal external nose, mucus membranes, and septum.  OroPharynx Exam:  Dental hygiene adequate. Normal buccal mucosa. Normal pharynx.  Neck Exam:  Supple, no masses or nodes. No audible bruits  Thyroid Exam: No nodules or enlargement.  Chest/Respiratory Exam: Normal chest wall and respirations. Clear to auscultation.  Cardiovascular Exam: Regular rate and rhythm. S1, S2, no murmur, click, gallop, or rubs.  Gastrointestinal Exam: Soft, non-tender, no masses or organomegaly.  Lymphatic Exam: Non-palpable nodes in neck, clavicular regions.  Musculoskeletal Exam: Back is straight and non-tender, full ROM of upper and lower extremities. Muscular tenderness left flank.   Foot Exam: Left and right foot: good pedal pulses  Skin: no rash or abnormalities  Neurologic Exam: Nonfocal, normal gross motor, tone coordination and no tremor.  Psychiatric Exam: Alert and oriented - appropriate affect.     Results for orders placed or performed in visit on 11/15/23   PSA tumor marker     Status: Normal   Result " Value Ref Range    PSA Tumor Marker 0.11 0.00 - 6.50 ng/mL    Narrative    This result is obtained using the Roche Elecsys total PSA method on the yaquelin e601 immunoassay analyzer. Results obtained with different assay methods or kits cannot be used interchangeably.   Lipid Profile     Status: Normal   Result Value Ref Range    Cholesterol 137 <200 mg/dL    Triglycerides 117 <150 mg/dL    Direct Measure HDL 40 >=40 mg/dL    LDL Cholesterol Calculated 74 <=100 mg/dL    Non HDL Cholesterol 97 <130 mg/dL    Narrative    Cholesterol  Desirable:  <200 mg/dL    Triglycerides  Normal:  Less than 150 mg/dL  Borderline High:  150-199 mg/dL  High:  200-499 mg/dL  Very High:  Greater than or equal to 500 mg/dL    Direct Measure HDL  Female:  Greater than or equal to 50 mg/dL   Male:  Greater than or equal to 40 mg/dL    LDL Cholesterol  Desirable:  <100mg/dL  Above Desirable:  100-129 mg/dL   Borderline High:  130-159 mg/dL   High:  160-189 mg/dL   Very High:  >= 190 mg/dL    Non HDL Cholesterol  Desirable:  130 mg/dL  Above Desirable:  130-159 mg/dL  Borderline High:  160-189 mg/dL  High:  190-219 mg/dL  Very High:  Greater than or equal to 220 mg/dL   Hemoglobin A1c     Status: Normal   Result Value Ref Range    Hemoglobin A1C 5.2 4.0 - 6.2 %   Comprehensive metabolic panel     Status: Abnormal   Result Value Ref Range    Sodium 138 135 - 145 mmol/L    Potassium 4.2 3.4 - 5.3 mmol/L    Carbon Dioxide (CO2) 28 22 - 29 mmol/L    Anion Gap 8 7 - 15 mmol/L    Urea Nitrogen 16.0 8.0 - 23.0 mg/dL    Creatinine 0.79 0.67 - 1.17 mg/dL    GFR Estimate 90 >60 mL/min/1.73m2    Calcium 10.3 (H) 8.8 - 10.2 mg/dL    Chloride 102 98 - 107 mmol/L    Glucose 105 (H) 70 - 99 mg/dL    Alkaline Phosphatase 163 (H) 40 - 150 U/L    AST 77 (H) 0 - 45 U/L    ALT 67 0 - 70 U/L    Protein Total 7.6 6.4 - 8.3 g/dL    Albumin 4.3 3.5 - 5.2 g/dL    Bilirubin Total 1.1 <=1.2 mg/dL   CBC with platelets and differential     Status: Abnormal   Result Value  Ref Range    WBC Count 6.5 4.0 - 11.0 10e3/uL    RBC Count 5.86 4.40 - 5.90 10e6/uL    Hemoglobin 18.0 (H) 13.3 - 17.7 g/dL    Hematocrit 53.5 (H) 40.0 - 53.0 %    MCV 91 78 - 100 fL    MCH 30.7 26.5 - 33.0 pg    MCHC 33.6 31.5 - 36.5 g/dL    RDW 13.4 10.0 - 15.0 %    Platelet Count 146 (L) 150 - 450 10e3/uL    % Neutrophils 69 %    % Lymphocytes 18 %    % Monocytes 9 %    % Eosinophils 3 %    % Basophils 1 %    % Immature Granulocytes 0 %    NRBCs per 100 WBC 0 <1 /100    Absolute Neutrophils 4.5 1.6 - 8.3 10e3/uL    Absolute Lymphocytes 1.2 0.8 - 5.3 10e3/uL    Absolute Monocytes 0.6 0.0 - 1.3 10e3/uL    Absolute Eosinophils 0.2 0.0 - 0.7 10e3/uL    Absolute Basophils 0.1 0.0 - 0.2 10e3/uL    Absolute Immature Granulocytes 0.0 <=0.4 10e3/uL    Absolute NRBCs 0.0 10e3/uL   CBC with Platelets & Differential     Status: Abnormal    Narrative    The following orders were created for panel order CBC with Platelets & Differential.  Procedure                               Abnormality         Status                     ---------                               -----------         ------                     CBC with platelets and d...[[id number]]  Abnormal            Final result                 Please view results for these tests on the individual orders.      ASSESSMENT/Plan :    Heriberto was seen today for medicare visit.    Diagnoses and all orders for this visit:    Encounter for Medicare annual wellness exam    Malignant neoplasm of prostate (H)  -     PSA tumor marker; Future  -     PSA tumor marker    Coronary artery calcification seen on CT scan  -     Comprehensive metabolic panel; Future  -     Lipid Profile; Future  -     Lipid Profile  -     Comprehensive metabolic panel    Hyperlipidemia, unspecified hyperlipidemia type  -     Comprehensive metabolic panel; Future  -     Lipid Profile; Future  -     Lipid Profile  -     Comprehensive metabolic panel    Dilatation of thoracic aorta (H24)    Polycythemia  -     CBC  "with Platelets & Differential; Future  -     CBC with Platelets & Differential    Primary osteoarthritis, unspecified site  -     CBC with Platelets & Differential; Future  -     Comprehensive metabolic panel; Future  -     Comprehensive metabolic panel  -     CBC with Platelets & Differential    Elevated glucose  -     Hemoglobin A1c; Future  -     Hemoglobin A1c      Reviewed labs with him.  Plan to repeat next year.  He will continue to follow with cardiology.  Continue with his current medications.  Immunizations are up-to-date.    A total of 35 minutes was spent with the patient, reviewing records, tests, ordering medications, tests or procedures and documenting clinical information in the EHR in addition to Medicare Wellness.     Maurice Agustin MD          Answers submitted by the patient for this visit:  Annual Preventive Visit (Submitted on 11/15/2023)  Chief Complaint: Annual Exam:  In general, how would you rate your overall physical health?: excellent  Frequency of exercise:: 6-7 days/week  Do you usually eat at least 4 servings of fruit and vegetables a day, include whole grains & fiber, and avoid regularly eating high fat or \"junk\" foods? : No  Taking medications regularly:: Yes  Medication side effects:: None  Activities of Daily Living: no assistance needed  Home safety: no safety concerns identified  Hearing Impairment:: no hearing concerns  In the past 6 months, have you been bothered by leaking of urine?: No  abdominal pain: No  Blood in stool: No  Blood in urine: No  chest pain: No  chills: No  congestion: No  constipation: No  cough: No  diarrhea: No  dizziness: No  ear pain: No  eye pain: No  nervous/anxious: No  fever: No  frequency: Yes  genital sores: No  hearing loss: Yes  heartburn: No  arthralgias: No  joint swelling: No  peripheral edema: No  mood changes: No  myalgias: No  nausea: No  dysuria: No  palpitations: No  Skin sensation changes: Yes  sore throat: No  urgency: Yes  rash: " No  shortness of breath: No  visual disturbance: No  weakness: No  impotence: No  penile discharge: No  In general, how would you rate your overall mental or emotional health?: excellent  Additional concerns today:: No  Exercise outside of work (Submitted on 11/15/2023)  Chief Complaint: Annual Exam:  Duration of exercise:: 45-60 minutes    SUBJECTIVE:   Heriberto is a 78 year old, presenting for the following:  Medicare Visit         No data to display                Are you in the first 12 months of your Medicare coverage?  No    HPI    Today's PHQ-2 Score:       11/15/2023     8:26 AM   PHQ-2 ( 1999 [employer])   Q1: Little interest or pleasure in doing things 0   Q2: Feeling down, depressed or hopeless 0   PHQ-2 Score 0   Q1: Little interest or pleasure in doing things Not at all   Q2: Feeling down, depressed or hopeless Not at all   PHQ-2 Score 0           Have you ever done Advance Care Planning? (For example, a Health Directive, POLST, or a discussion with a medical provider or your loved ones about your wishes): Yes, advance care planning is on file.       Fall risk  Fallen 2 or more times in the past year?: No  Any fall with injury in the past year?: No  click delete button to remove this line now  Cognitive Screening   1) Repeat 3 items (Leader, Season, Table)    2) Clock draw: NORMAL  3) 3 item recall: Recalls 3 objects  Results: 3 items recalled: COGNITIVE IMPAIRMENT LESS LIKELY    Mini-CogTM Copyright KSENIA Renner. Licensed by the author for use in [facility]; reprinted with permission (marilin@.[facility]). All rights reserved.      Do you have sleep apnea, excessive snoring or daytime drowsiness? : no    Reviewed and updated as needed this visit by clinical staff   Tobacco  Allergies  Meds  Problems  Med Hx  Surg Hx  Fam Hx          Reviewed and updated as needed this visit by Provider   Tobacco  Allergies  Meds  Problems  Med Hx  Surg Hx  Fam Hx         Social History     Tobacco Use     "Smoking status: Former     Types: Cigarettes     Quit date: 1979     Years since quittin.9    Smokeless tobacco: Never   Substance Use Topics    Alcohol use: Yes     Comment: very rare             11/15/2023     8:25 AM   Alcohol Use   Prescreen: >3 drinks/day or >7 drinks/week? No     Do you have a current opioid prescription? No  Do you use any other controlled substances or medications that are not prescribed by a provider? None              Current providers sharing in care for this patient include:   Patient Care Team:  Maurice Agustin MD as PCP - General  Maurice Agustin MD as Assigned PCP  Nissen, Rick L, MD as MD (Otolaryngology)  Charlotte Lala AuD as Audiologist (Audiology)    The following health maintenance items are reviewed in Epic and correct as of today:  Health Maintenance   Topic Date Due    MEDICARE ANNUAL WELLNESS VISIT  11/15/2024    FALL RISK ASSESSMENT  11/15/2024    DTAP/TDAP/TD IMMUNIZATION (2 - Td or Tdap) 2024    ADVANCE CARE PLANNING  2027    LIPID  11/15/2028    PHQ-2 (once per calendar year)  Completed    INFLUENZA VACCINE  Completed    Pneumococcal Vaccine: 65+ Years  Completed    ZOSTER IMMUNIZATION  Completed    RSV VACCINE (Pregnancy & 60+)  Completed    COVID-19 Vaccine  Completed    IPV IMMUNIZATION  Aged Out    HPV IMMUNIZATION  Aged Out    MENINGITIS IMMUNIZATION  Aged Out    RSV MONOCLONAL ANTIBODY  Aged Out    HEPATITIS C SCREENING  Discontinued    COLORECTAL CANCER SCREENING  Discontinued     Labs reviewed in EPIC          Review of Systems      OBJECTIVE:   /74   Pulse 87   Temp 97.5  F (36.4  C)   Resp 14   Ht 1.695 m (5' 6.75\")   Wt 82 kg (180 lb 12.8 oz)   SpO2 97%   BMI 28.53 kg/m   Estimated body mass index is 28.53 kg/m  as calculated from the following:    Height as of this encounter: 1.695 m (5' 6.75\").    Weight as of this encounter: 82 kg (180 lb 12.8 oz).  Physical Exam          ASSESSMENT / PLAN:   Heriberto was seen today " "for medicare visit.    Diagnoses and all orders for this visit:    Encounter for Medicare annual wellness exam    Malignant neoplasm of prostate (H)  -     PSA tumor marker; Future  -     PSA tumor marker    Coronary artery calcification seen on CT scan  -     Comprehensive metabolic panel; Future  -     Lipid Profile; Future  -     Lipid Profile  -     Comprehensive metabolic panel    Hyperlipidemia, unspecified hyperlipidemia type  -     Comprehensive metabolic panel; Future  -     Lipid Profile; Future  -     Lipid Profile  -     Comprehensive metabolic panel    Dilatation of thoracic aorta (H24)    Polycythemia  -     CBC with Platelets & Differential; Future  -     CBC with Platelets & Differential    Primary osteoarthritis, unspecified site  -     CBC with Platelets & Differential; Future  -     Comprehensive metabolic panel; Future  -     Comprehensive metabolic panel  -     CBC with Platelets & Differential    Elevated glucose  -     Hemoglobin A1c; Future  -     Hemoglobin A1c        Patient has been advised of split billing requirements and indicates understanding: Yes      COUNSELING:  Reviewed preventive health counseling, as reflected in patient instructions       Regular exercise       Healthy diet/nutrition       Fall risk prevention       Aspirin prophylaxis        Colon cancer screening       Prostate cancer screening      BMI:   Estimated body mass index is 28.53 kg/m  as calculated from the following:    Height as of this encounter: 1.695 m (5' 6.75\").    Weight as of this encounter: 82 kg (180 lb 12.8 oz).         He reports that he quit smoking about 44 years ago. His smoking use included cigarettes. He has never used smokeless tobacco.      Appropriate preventive services were discussed with this patient, including applicable screening as appropriate for fall prevention, nutrition, physical activity, Tobacco-use cessation, weight loss and cognition.  Checklist reviewing preventive services " available has been given to the patient.    Reviewed patients plan of care and provided an AVS. The Basic Care Plan (routine screening as documented in Health Maintenance) for Heriberto meets the Care Plan requirement. This Care Plan has been established and reviewed with the Patient.          Maurice Agustin MD  [facility]    Identified Health Risks:  I have reviewed Opioid Use Disorder and Substance Use Disorder risk factors and made any needed referrals.

## 2024-01-01 ENCOUNTER — PATIENT OUTREACH (OUTPATIENT)
Dept: ONCOLOGY | Facility: OTHER | Age: 79
End: 2024-01-01
Payer: COMMERCIAL

## 2024-01-01 ENCOUNTER — TELEPHONE (OUTPATIENT)
Dept: FAMILY MEDICINE | Facility: OTHER | Age: 79
End: 2024-01-01
Payer: COMMERCIAL

## 2024-01-01 ENCOUNTER — HOSPITAL ENCOUNTER (OUTPATIENT)
Dept: INFUSION THERAPY | Facility: OTHER | Age: 79
Discharge: HOME | End: 2024-05-07
Attending: INTERNAL MEDICINE | Admitting: INTERNAL MEDICINE
Payer: COMMERCIAL

## 2024-01-01 ENCOUNTER — ANESTHESIA (OUTPATIENT)
Dept: SURGERY | Facility: OTHER | Age: 79
End: 2024-01-01
Payer: COMMERCIAL

## 2024-01-01 ENCOUNTER — TELEPHONE (OUTPATIENT)
Dept: CT IMAGING | Facility: OTHER | Age: 79
End: 2024-01-01
Payer: COMMERCIAL

## 2024-01-01 ENCOUNTER — OFFICE VISIT (OUTPATIENT)
Dept: FAMILY MEDICINE | Facility: OTHER | Age: 79
End: 2024-01-01
Attending: FAMILY MEDICINE
Payer: COMMERCIAL

## 2024-01-01 ENCOUNTER — DOCUMENTATION ONLY (OUTPATIENT)
Dept: OTHER | Facility: CLINIC | Age: 79
End: 2024-01-01
Payer: COMMERCIAL

## 2024-01-01 ENCOUNTER — HOSPITAL ENCOUNTER (EMERGENCY)
Facility: OTHER | Age: 79
Discharge: HOME | End: 2024-04-08
Attending: PHYSICIAN ASSISTANT | Admitting: PHYSICIAN ASSISTANT
Payer: COMMERCIAL

## 2024-01-01 ENCOUNTER — APPOINTMENT (OUTPATIENT)
Dept: ULTRASOUND IMAGING | Facility: OTHER | Age: 79
End: 2024-01-01
Attending: PHYSICIAN ASSISTANT
Payer: COMMERCIAL

## 2024-01-01 ENCOUNTER — APPOINTMENT (OUTPATIENT)
Dept: GENERAL RADIOLOGY | Facility: OTHER | Age: 79
End: 2024-01-01
Payer: COMMERCIAL

## 2024-01-01 ENCOUNTER — HOSPITAL ENCOUNTER (OUTPATIENT)
Dept: PET IMAGING | Facility: OTHER | Age: 79
Discharge: HOME | End: 2024-04-24
Attending: FAMILY MEDICINE | Admitting: FAMILY MEDICINE
Payer: COMMERCIAL

## 2024-01-01 ENCOUNTER — HOSPITAL ENCOUNTER (EMERGENCY)
Facility: OTHER | Age: 79
Discharge: HOME | End: 2024-05-15
Payer: COMMERCIAL

## 2024-01-01 ENCOUNTER — HOSPITAL ENCOUNTER (OUTPATIENT)
Facility: OTHER | Age: 79
Discharge: HOME | End: 2024-05-06
Attending: SURGERY | Admitting: SURGERY
Payer: COMMERCIAL

## 2024-01-01 ENCOUNTER — TELEPHONE (OUTPATIENT)
Dept: SURGERY | Facility: OTHER | Age: 79
End: 2024-01-01

## 2024-01-01 ENCOUNTER — APPOINTMENT (OUTPATIENT)
Dept: CT IMAGING | Facility: OTHER | Age: 79
End: 2024-01-01
Attending: PHYSICIAN ASSISTANT
Payer: COMMERCIAL

## 2024-01-01 ENCOUNTER — TELEPHONE (OUTPATIENT)
Dept: ONCOLOGY | Facility: OTHER | Age: 79
End: 2024-01-01
Payer: COMMERCIAL

## 2024-01-01 ENCOUNTER — PATIENT OUTREACH (OUTPATIENT)
Dept: ONCOLOGY | Facility: OTHER | Age: 79
End: 2024-01-01

## 2024-01-01 ENCOUNTER — LAB (OUTPATIENT)
Dept: LAB | Facility: OTHER | Age: 79
End: 2024-01-01
Attending: FAMILY MEDICINE
Payer: COMMERCIAL

## 2024-01-01 ENCOUNTER — TELEPHONE (OUTPATIENT)
Dept: ONCOLOGY | Facility: OTHER | Age: 79
End: 2024-01-01

## 2024-01-01 ENCOUNTER — ONCOLOGY VISIT (OUTPATIENT)
Dept: ONCOLOGY | Facility: OTHER | Age: 79
End: 2024-01-01
Attending: INTERNAL MEDICINE
Payer: COMMERCIAL

## 2024-01-01 ENCOUNTER — TELEPHONE (OUTPATIENT)
Dept: FAMILY MEDICINE | Facility: OTHER | Age: 79
End: 2024-01-01

## 2024-01-01 ENCOUNTER — NURSE TRIAGE (OUTPATIENT)
Dept: FAMILY MEDICINE | Facility: OTHER | Age: 79
End: 2024-01-01
Payer: COMMERCIAL

## 2024-01-01 ENCOUNTER — ANESTHESIA EVENT (OUTPATIENT)
Dept: SURGERY | Facility: OTHER | Age: 79
End: 2024-01-01
Payer: COMMERCIAL

## 2024-01-01 ENCOUNTER — PATIENT OUTREACH (OUTPATIENT)
Dept: CARE COORDINATION | Facility: CLINIC | Age: 79
End: 2024-01-01
Payer: COMMERCIAL

## 2024-01-01 ENCOUNTER — APPOINTMENT (OUTPATIENT)
Dept: ULTRASOUND IMAGING | Facility: OTHER | Age: 79
End: 2024-01-01
Payer: COMMERCIAL

## 2024-01-01 ENCOUNTER — ONCOLOGY VISIT (OUTPATIENT)
Dept: ONCOLOGY | Facility: OTHER | Age: 79
End: 2024-01-01
Attending: FAMILY MEDICINE
Payer: COMMERCIAL

## 2024-01-01 ENCOUNTER — HOSPITAL ENCOUNTER (OUTPATIENT)
Dept: CT IMAGING | Facility: OTHER | Age: 79
Discharge: HOME | End: 2024-04-18
Attending: FAMILY MEDICINE
Payer: COMMERCIAL

## 2024-01-01 ENCOUNTER — HOSPITAL ENCOUNTER (OUTPATIENT)
Dept: CT IMAGING | Facility: OTHER | Age: 79
Discharge: HOME | End: 2024-05-01
Attending: FAMILY MEDICINE
Payer: COMMERCIAL

## 2024-01-01 ENCOUNTER — DOCUMENTATION ONLY (OUTPATIENT)
Dept: ONCOLOGY | Facility: OTHER | Age: 79
End: 2024-01-01
Payer: COMMERCIAL

## 2024-01-01 VITALS
BODY MASS INDEX: 28.25 KG/M2 | DIASTOLIC BLOOD PRESSURE: 81 MMHG | HEART RATE: 100 BPM | OXYGEN SATURATION: 93 % | HEIGHT: 67 IN | WEIGHT: 180 LBS | SYSTOLIC BLOOD PRESSURE: 109 MMHG | TEMPERATURE: 97 F | RESPIRATION RATE: 16 BRPM

## 2024-01-01 VITALS
DIASTOLIC BLOOD PRESSURE: 88 MMHG | OXYGEN SATURATION: 95 % | SYSTOLIC BLOOD PRESSURE: 136 MMHG | WEIGHT: 178 LBS | TEMPERATURE: 97.3 F | BODY MASS INDEX: 27.88 KG/M2 | HEART RATE: 105 BPM

## 2024-01-01 VITALS
HEIGHT: 67 IN | DIASTOLIC BLOOD PRESSURE: 68 MMHG | OXYGEN SATURATION: 93 % | TEMPERATURE: 96.8 F | BODY MASS INDEX: 27.94 KG/M2 | WEIGHT: 178 LBS | HEART RATE: 100 BPM | SYSTOLIC BLOOD PRESSURE: 128 MMHG

## 2024-01-01 VITALS
RESPIRATION RATE: 18 BRPM | WEIGHT: 180 LBS | TEMPERATURE: 97.5 F | SYSTOLIC BLOOD PRESSURE: 140 MMHG | DIASTOLIC BLOOD PRESSURE: 102 MMHG | BODY MASS INDEX: 28.25 KG/M2 | HEART RATE: 81 BPM | OXYGEN SATURATION: 94 % | HEIGHT: 67 IN

## 2024-01-01 VITALS
TEMPERATURE: 97.1 F | BODY MASS INDEX: 29.03 KG/M2 | HEART RATE: 67 BPM | WEIGHT: 184 LBS | DIASTOLIC BLOOD PRESSURE: 86 MMHG | SYSTOLIC BLOOD PRESSURE: 124 MMHG | OXYGEN SATURATION: 96 % | RESPIRATION RATE: 16 BRPM

## 2024-01-01 VITALS
TEMPERATURE: 98.2 F | WEIGHT: 178.6 LBS | OXYGEN SATURATION: 93 % | SYSTOLIC BLOOD PRESSURE: 104 MMHG | BODY MASS INDEX: 27.97 KG/M2 | HEART RATE: 81 BPM | DIASTOLIC BLOOD PRESSURE: 80 MMHG | RESPIRATION RATE: 16 BRPM

## 2024-01-01 VITALS
TEMPERATURE: 97.2 F | OXYGEN SATURATION: 93 % | SYSTOLIC BLOOD PRESSURE: 124 MMHG | HEART RATE: 105 BPM | BODY MASS INDEX: 28 KG/M2 | WEIGHT: 178.8 LBS | DIASTOLIC BLOOD PRESSURE: 82 MMHG | RESPIRATION RATE: 16 BRPM

## 2024-01-01 VITALS
RESPIRATION RATE: 18 BRPM | SYSTOLIC BLOOD PRESSURE: 121 MMHG | BODY MASS INDEX: 29.51 KG/M2 | TEMPERATURE: 97.9 F | WEIGHT: 188.4 LBS | OXYGEN SATURATION: 93 % | DIASTOLIC BLOOD PRESSURE: 74 MMHG | HEART RATE: 86 BPM

## 2024-01-01 VITALS
HEART RATE: 82 BPM | DIASTOLIC BLOOD PRESSURE: 86 MMHG | RESPIRATION RATE: 18 BRPM | TEMPERATURE: 98.2 F | OXYGEN SATURATION: 91 % | SYSTOLIC BLOOD PRESSURE: 126 MMHG

## 2024-01-01 VITALS
WEIGHT: 178 LBS | DIASTOLIC BLOOD PRESSURE: 81 MMHG | HEIGHT: 67 IN | HEART RATE: 94 BPM | BODY MASS INDEX: 27.94 KG/M2 | OXYGEN SATURATION: 87 % | SYSTOLIC BLOOD PRESSURE: 109 MMHG | RESPIRATION RATE: 14 BRPM | TEMPERATURE: 97.5 F

## 2024-01-01 DIAGNOSIS — R16.0 LIVER MASS, RIGHT LOBE: Primary | ICD-10-CM

## 2024-01-01 DIAGNOSIS — R10.9 ACUTE ABDOMINAL PAIN: Primary | ICD-10-CM

## 2024-01-01 DIAGNOSIS — R79.89 ELEVATED LFTS: Primary | ICD-10-CM

## 2024-01-01 DIAGNOSIS — C79.51 HEPATOCELLULAR CARCINOMA METASTATIC TO BONE (H): Primary | ICD-10-CM

## 2024-01-01 DIAGNOSIS — D75.1 POLYCYTHEMIA: ICD-10-CM

## 2024-01-01 DIAGNOSIS — R10.9 ACUTE ABDOMINAL PAIN: ICD-10-CM

## 2024-01-01 DIAGNOSIS — C22.0 HEPATOCELLULAR CARCINOMA (H): ICD-10-CM

## 2024-01-01 DIAGNOSIS — C22.0 HEPATOCELLULAR CARCINOMA METASTATIC TO BONE (H): Primary | ICD-10-CM

## 2024-01-01 DIAGNOSIS — C22.7 OTHER SPECIFIED CARCINOMAS OF LIVER (H): ICD-10-CM

## 2024-01-01 DIAGNOSIS — R94.5 ABNORMAL RESULTS OF LIVER FUNCTION STUDIES: ICD-10-CM

## 2024-01-01 DIAGNOSIS — R60.0 BILATERAL LEG EDEMA: ICD-10-CM

## 2024-01-01 DIAGNOSIS — C61 MALIGNANT NEOPLASM OF PROSTATE (H): ICD-10-CM

## 2024-01-01 DIAGNOSIS — L82.1 SEBORRHEIC KERATOSIS: ICD-10-CM

## 2024-01-01 DIAGNOSIS — N30.01 ACUTE CYSTITIS WITH HEMATURIA: ICD-10-CM

## 2024-01-01 DIAGNOSIS — R16.0 LIVER MASS, RIGHT LOBE: ICD-10-CM

## 2024-01-01 DIAGNOSIS — R16.0 LIVER MASS: Primary | ICD-10-CM

## 2024-01-01 DIAGNOSIS — R10.9 RIGHT FLANK PAIN: ICD-10-CM

## 2024-01-01 DIAGNOSIS — R16.0 LIVER MASS: ICD-10-CM

## 2024-01-01 DIAGNOSIS — J20.9 ACUTE BRONCHITIS, UNSPECIFIED ORGANISM: ICD-10-CM

## 2024-01-01 DIAGNOSIS — R18.0 MALIGNANT ASCITES (H): ICD-10-CM

## 2024-01-01 DIAGNOSIS — C22.0 HEPATOCELLULAR CARCINOMA METASTATIC TO BONE (H): ICD-10-CM

## 2024-01-01 DIAGNOSIS — R10.11 ABDOMINAL PAIN, RIGHT UPPER QUADRANT: ICD-10-CM

## 2024-01-01 DIAGNOSIS — R31.0 GROSS HEMATURIA: ICD-10-CM

## 2024-01-01 DIAGNOSIS — K21.9: Primary | ICD-10-CM

## 2024-01-01 DIAGNOSIS — Z01.812 PRE-PROCEDURE LAB EXAM: Primary | ICD-10-CM

## 2024-01-01 DIAGNOSIS — I81 THROMBOSIS, PORTAL VEIN: ICD-10-CM

## 2024-01-01 DIAGNOSIS — R74.01 TRANSAMINITIS: ICD-10-CM

## 2024-01-01 DIAGNOSIS — G89.3 CANCER ASSOCIATED PAIN: ICD-10-CM

## 2024-01-01 DIAGNOSIS — C79.51 HEPATOCELLULAR CARCINOMA METASTATIC TO BONE (H): ICD-10-CM

## 2024-01-01 DIAGNOSIS — I77.810 DILATATION OF THORACIC AORTA (H): ICD-10-CM

## 2024-01-01 DIAGNOSIS — G89.3 CANCER ASSOCIATED PAIN: Primary | ICD-10-CM

## 2024-01-01 DIAGNOSIS — Z11.59 ENCOUNTER FOR SCREENING FOR OTHER VIRAL DISEASES: ICD-10-CM

## 2024-01-01 DIAGNOSIS — R10.11 RIGHT UPPER QUADRANT PAIN: ICD-10-CM

## 2024-01-01 DIAGNOSIS — I81 THROMBOSIS, PORTAL VEIN: Primary | ICD-10-CM

## 2024-01-01 LAB
AFP SERPL-MCNC: ABNORMAL NG/ML
ALBUMIN SERPL BCG-MCNC: 2.9 G/DL (ref 3.5–5.2)
ALBUMIN SERPL BCG-MCNC: 3.1 G/DL (ref 3.5–5.2)
ALBUMIN SERPL BCG-MCNC: 3.3 G/DL (ref 3.5–5.2)
ALBUMIN SERPL BCG-MCNC: 3.9 G/DL (ref 3.5–5.2)
ALBUMIN SERPL BCG-MCNC: 4.3 G/DL (ref 3.5–5.2)
ALBUMIN UR-MCNC: 20 MG/DL
ALBUMIN UR-MCNC: 50 MG/DL
ALBUMIN UR-MCNC: 70 MG/DL
ALBUMIN UR-MCNC: NEGATIVE MG/DL
ALP SERPL-CCNC: 184 U/L (ref 40–150)
ALP SERPL-CCNC: 274 U/L (ref 40–150)
ALP SERPL-CCNC: 299 U/L (ref 40–150)
ALP SERPL-CCNC: 309 U/L (ref 40–150)
ALP SERPL-CCNC: 337 U/L (ref 40–150)
ALT SERPL W P-5'-P-CCNC: 48 U/L (ref 0–70)
ALT SERPL W P-5'-P-CCNC: 54 U/L (ref 0–70)
ALT SERPL W P-5'-P-CCNC: 62 U/L (ref 0–70)
ALT SERPL W P-5'-P-CCNC: 71 U/L (ref 0–70)
ALT SERPL W P-5'-P-CCNC: 82 U/L (ref 0–70)
ANA SER QL IF: NEGATIVE
ANION GAP SERPL CALCULATED.3IONS-SCNC: 10 MMOL/L (ref 7–15)
ANION GAP SERPL CALCULATED.3IONS-SCNC: 11 MMOL/L (ref 7–15)
ANION GAP SERPL CALCULATED.3IONS-SCNC: 11 MMOL/L (ref 7–15)
ANION GAP SERPL CALCULATED.3IONS-SCNC: 12 MMOL/L (ref 7–15)
ANION GAP SERPL CALCULATED.3IONS-SCNC: 16 MMOL/L (ref 7–15)
APPEARANCE UR: ABNORMAL
APPEARANCE UR: ABNORMAL
APPEARANCE UR: CLEAR
AST SERPL W P-5'-P-CCNC: 125 U/L (ref 0–45)
AST SERPL W P-5'-P-CCNC: 147 U/L (ref 0–45)
AST SERPL W P-5'-P-CCNC: 191 U/L (ref 0–45)
AST SERPL W P-5'-P-CCNC: 194 U/L (ref 0–45)
AST SERPL W P-5'-P-CCNC: 84 U/L (ref 0–45)
ATRIAL RATE - MUSE: 82 BPM
BACTERIA #/AREA URNS HPF: ABNORMAL /HPF
BACTERIA UR CULT: ABNORMAL
BACTERIA UR CULT: NORMAL
BASOPHILS # BLD AUTO: 0.1 10E3/UL (ref 0–0.2)
BASOPHILS NFR BLD AUTO: 1 %
BILIRUB SERPL-MCNC: 1.4 MG/DL
BILIRUB SERPL-MCNC: 1.5 MG/DL
BILIRUB SERPL-MCNC: 1.8 MG/DL
BILIRUB SERPL-MCNC: 2.4 MG/DL
BILIRUB SERPL-MCNC: 2.9 MG/DL
BILIRUB UR QL STRIP: ABNORMAL
BILIRUB UR QL STRIP: NEGATIVE
BILIRUB UR QL STRIP: NEGATIVE
BUN SERPL-MCNC: 12.7 MG/DL (ref 8–23)
BUN SERPL-MCNC: 13.1 MG/DL (ref 8–23)
BUN SERPL-MCNC: 17.3 MG/DL (ref 8–23)
BUN SERPL-MCNC: 18.1 MG/DL (ref 8–23)
BUN SERPL-MCNC: 19.3 MG/DL (ref 8–23)
CALCIUM SERPL-MCNC: 9.5 MG/DL (ref 8.8–10.2)
CALCIUM SERPL-MCNC: 9.6 MG/DL (ref 8.8–10.2)
CALCIUM SERPL-MCNC: 9.8 MG/DL (ref 8.8–10.2)
CHLORIDE SERPL-SCNC: 102 MMOL/L (ref 98–107)
CHLORIDE SERPL-SCNC: 92 MMOL/L (ref 98–107)
CHLORIDE SERPL-SCNC: 96 MMOL/L (ref 98–107)
CHLORIDE SERPL-SCNC: 97 MMOL/L (ref 98–107)
CHLORIDE SERPL-SCNC: 99 MMOL/L (ref 98–107)
COLOR UR AUTO: ABNORMAL
COLOR UR AUTO: YELLOW
COLOR UR AUTO: YELLOW
CREAT SERPL-MCNC: 0.57 MG/DL (ref 0.67–1.17)
CREAT SERPL-MCNC: 0.71 MG/DL (ref 0.67–1.17)
CREAT SERPL-MCNC: 0.74 MG/DL (ref 0.67–1.17)
CREAT SERPL-MCNC: 0.84 MG/DL (ref 0.67–1.17)
CREAT SERPL-MCNC: 0.92 MG/DL (ref 0.67–1.17)
DEPRECATED HCO3 PLAS-SCNC: 23 MMOL/L (ref 22–29)
DEPRECATED HCO3 PLAS-SCNC: 25 MMOL/L (ref 22–29)
DEPRECATED HCO3 PLAS-SCNC: 25 MMOL/L (ref 22–29)
DEPRECATED HCO3 PLAS-SCNC: 27 MMOL/L (ref 22–29)
DEPRECATED HCO3 PLAS-SCNC: 29 MMOL/L (ref 22–29)
DIASTOLIC BLOOD PRESSURE - MUSE: NORMAL MMHG
EGFRCR SERPLBLD CKD-EPI 2021: 85 ML/MIN/1.73M2
EGFRCR SERPLBLD CKD-EPI 2021: 89 ML/MIN/1.73M2
EGFRCR SERPLBLD CKD-EPI 2021: >90 ML/MIN/1.73M2
EOSINOPHIL # BLD AUTO: 0 10E3/UL (ref 0–0.7)
EOSINOPHIL # BLD AUTO: 0.1 10E3/UL (ref 0–0.7)
EOSINOPHIL # BLD AUTO: 0.1 10E3/UL (ref 0–0.7)
EOSINOPHIL # BLD AUTO: 0.2 10E3/UL (ref 0–0.7)
EOSINOPHIL NFR BLD AUTO: 0 %
EOSINOPHIL NFR BLD AUTO: 1 %
EOSINOPHIL NFR BLD AUTO: 1 %
EOSINOPHIL NFR BLD AUTO: 3 %
EPO SERPL-ACNC: 26 MU/ML
ERYTHROCYTE [DISTWIDTH] IN BLOOD BY AUTOMATED COUNT: 15.3 % (ref 10–15)
ERYTHROCYTE [DISTWIDTH] IN BLOOD BY AUTOMATED COUNT: 18.2 % (ref 10–15)
ERYTHROCYTE [DISTWIDTH] IN BLOOD BY AUTOMATED COUNT: 18.3 % (ref 10–15)
ERYTHROCYTE [DISTWIDTH] IN BLOOD BY AUTOMATED COUNT: 19.6 % (ref 10–15)
ERYTHROCYTE [DISTWIDTH] IN BLOOD BY AUTOMATED COUNT: 20.3 % (ref 10–15)
ERYTHROCYTE [SEDIMENTATION RATE] IN BLOOD BY WESTERGREN METHOD: 16 MM/HR (ref 0–20)
GLUCOSE SERPL-MCNC: 100 MG/DL (ref 70–99)
GLUCOSE SERPL-MCNC: 100 MG/DL (ref 70–99)
GLUCOSE SERPL-MCNC: 103 MG/DL (ref 70–99)
GLUCOSE SERPL-MCNC: 151 MG/DL (ref 70–99)
GLUCOSE SERPL-MCNC: 78 MG/DL (ref 70–99)
GLUCOSE UR STRIP-MCNC: NEGATIVE MG/DL
HBV CORE AB SERPL QL IA: NONREACTIVE
HBV SURFACE AB SERPL IA-ACNC: <3.5 M[IU]/ML
HBV SURFACE AB SERPL IA-ACNC: NONREACTIVE M[IU]/ML
HBV SURFACE AG SERPL QL IA: NONREACTIVE
HCT VFR BLD AUTO: 54.8 % (ref 40–53)
HCT VFR BLD AUTO: 55.1 % (ref 40–53)
HCT VFR BLD AUTO: 56.6 % (ref 40–53)
HCT VFR BLD AUTO: 58.4 % (ref 40–53)
HCT VFR BLD AUTO: 59.7 % (ref 40–53)
HCV AB SERPL QL IA: NONREACTIVE
HGB BLD-MCNC: 18.4 G/DL (ref 13.3–17.7)
HGB BLD-MCNC: 18.9 G/DL (ref 13.3–17.7)
HGB BLD-MCNC: 19 G/DL (ref 13.3–17.7)
HGB BLD-MCNC: 19.3 G/DL (ref 13.3–17.7)
HGB BLD-MCNC: 19.4 G/DL (ref 13.3–17.7)
HGB UR QL STRIP: ABNORMAL
HGB UR QL STRIP: ABNORMAL
HGB UR QL STRIP: NEGATIVE
HOLD SPECIMEN: NORMAL
HYALINE CASTS: 13 /LPF
HYALINE CASTS: 2 /LPF
IMM GRANULOCYTES # BLD: 0 10E3/UL
IMM GRANULOCYTES # BLD: 0 10E3/UL
IMM GRANULOCYTES # BLD: 0.1 10E3/UL
IMM GRANULOCYTES # BLD: 0.1 10E3/UL
IMM GRANULOCYTES NFR BLD: 0 %
IMM GRANULOCYTES NFR BLD: 0 %
IMM GRANULOCYTES NFR BLD: 1 %
IMM GRANULOCYTES NFR BLD: 1 %
INR PPP: 1.13 (ref 0.85–1.15)
INTERPRETATION ECG - MUSE: NORMAL
IRON BINDING CAPACITY (ROCHE): 315 UG/DL (ref 240–430)
IRON SATN MFR SERPL: 31 % (ref 15–46)
IRON SERPL-MCNC: 97 UG/DL (ref 61–157)
KETONES UR STRIP-MCNC: 20 MG/DL
KETONES UR STRIP-MCNC: ABNORMAL MG/DL
KETONES UR STRIP-MCNC: NEGATIVE MG/DL
LDH SERPL L TO P-CCNC: 429 U/L (ref 0–250)
LEUKOCYTE ESTERASE UR QL STRIP: ABNORMAL
LEUKOCYTE ESTERASE UR QL STRIP: NEGATIVE
LEUKOCYTE ESTERASE UR QL STRIP: NEGATIVE
LIPASE SERPL-CCNC: 40 U/L (ref 13–60)
LYMPHOCYTES # BLD AUTO: 0.8 10E3/UL (ref 0.8–5.3)
LYMPHOCYTES # BLD AUTO: 0.9 10E3/UL (ref 0.8–5.3)
LYMPHOCYTES # BLD AUTO: 1 10E3/UL (ref 0.8–5.3)
LYMPHOCYTES # BLD AUTO: 1.1 10E3/UL (ref 0.8–5.3)
LYMPHOCYTES NFR BLD AUTO: 11 %
LYMPHOCYTES NFR BLD AUTO: 18 %
LYMPHOCYTES NFR BLD AUTO: 8 %
LYMPHOCYTES NFR BLD AUTO: 9 %
MAGNESIUM SERPL-MCNC: 2.2 MG/DL (ref 1.7–2.3)
MCH RBC QN AUTO: 27.7 PG (ref 26.5–33)
MCH RBC QN AUTO: 27.8 PG (ref 26.5–33)
MCH RBC QN AUTO: 28.6 PG (ref 26.5–33)
MCH RBC QN AUTO: 28.9 PG (ref 26.5–33)
MCH RBC QN AUTO: 30.1 PG (ref 26.5–33)
MCHC RBC AUTO-ENTMCNC: 32.5 G/DL (ref 31.5–36.5)
MCHC RBC AUTO-ENTMCNC: 33 G/DL (ref 31.5–36.5)
MCHC RBC AUTO-ENTMCNC: 33.4 G/DL (ref 31.5–36.5)
MCHC RBC AUTO-ENTMCNC: 33.6 G/DL (ref 31.5–36.5)
MCHC RBC AUTO-ENTMCNC: 34.5 G/DL (ref 31.5–36.5)
MCV RBC AUTO: 83 FL (ref 78–100)
MCV RBC AUTO: 84 FL (ref 78–100)
MCV RBC AUTO: 86 FL (ref 78–100)
MCV RBC AUTO: 87 FL (ref 78–100)
MCV RBC AUTO: 88 FL (ref 78–100)
MONOCYTES # BLD AUTO: 0.5 10E3/UL (ref 0–1.3)
MONOCYTES # BLD AUTO: 0.7 10E3/UL (ref 0–1.3)
MONOCYTES # BLD AUTO: 0.8 10E3/UL (ref 0–1.3)
MONOCYTES # BLD AUTO: 1 10E3/UL (ref 0–1.3)
MONOCYTES NFR BLD AUTO: 10 %
MONOCYTES NFR BLD AUTO: 8 %
MUCOUS THREADS #/AREA URNS LPF: PRESENT /LPF
NEUTROPHILS # BLD AUTO: 4.5 10E3/UL (ref 1.6–8.3)
NEUTROPHILS # BLD AUTO: 6.9 10E3/UL (ref 1.6–8.3)
NEUTROPHILS # BLD AUTO: 8.3 10E3/UL (ref 1.6–8.3)
NEUTROPHILS # BLD AUTO: 8.5 10E3/UL (ref 1.6–8.3)
NEUTROPHILS NFR BLD AUTO: 70 %
NEUTROPHILS NFR BLD AUTO: 79 %
NEUTROPHILS NFR BLD AUTO: 81 %
NEUTROPHILS NFR BLD AUTO: 82 %
NITRATE UR QL: NEGATIVE
NRBC # BLD AUTO: 0 10E3/UL
NRBC BLD AUTO-RTO: 0 /100
NT-PROBNP SERPL-MCNC: 212 PG/ML (ref 0–1800)
P AXIS - MUSE: 9 DEGREES
PATH REPORT.COMMENTS IMP SPEC: NORMAL
PATH REPORT.COMMENTS IMP SPEC: NORMAL
PATH REPORT.FINAL DX SPEC: NORMAL
PATH REPORT.FINAL DX SPEC: NORMAL
PATH REPORT.RELEVANT HX SPEC: NORMAL
PH UR STRIP: 5.5 [PH] (ref 5–9)
PH UR STRIP: 5.5 [PH] (ref 5–9)
PH UR STRIP: 6 [PH] (ref 5–9)
PHOTO IMAGE: NORMAL
PHOTO IMAGE: NORMAL
PLATELET # BLD AUTO: 153 10E3/UL (ref 150–450)
PLATELET # BLD AUTO: 191 10E3/UL (ref 150–450)
PLATELET # BLD AUTO: 197 10E3/UL (ref 150–450)
PLATELET # BLD AUTO: 217 10E3/UL (ref 150–450)
PLATELET # BLD AUTO: 229 10E3/UL (ref 150–450)
POTASSIUM SERPL-SCNC: 3.9 MMOL/L (ref 3.4–5.3)
POTASSIUM SERPL-SCNC: 3.9 MMOL/L (ref 3.4–5.3)
POTASSIUM SERPL-SCNC: 4 MMOL/L (ref 3.4–5.3)
POTASSIUM SERPL-SCNC: 4.1 MMOL/L (ref 3.4–5.3)
POTASSIUM SERPL-SCNC: 4.8 MMOL/L (ref 3.4–5.3)
PR INTERVAL - MUSE: 178 MS
PROT SERPL-MCNC: 5.9 G/DL (ref 6.4–8.3)
PROT SERPL-MCNC: 6.4 G/DL (ref 6.4–8.3)
PROT SERPL-MCNC: 6.4 G/DL (ref 6.4–8.3)
PROT SERPL-MCNC: 7.2 G/DL (ref 6.4–8.3)
PROT SERPL-MCNC: 7.4 G/DL (ref 6.4–8.3)
PSA SERPL DL<=0.01 NG/ML-MCNC: 0.04 NG/ML (ref 0–6.5)
QRS DURATION - MUSE: 92 MS
QT - MUSE: 372 MS
QTC - MUSE: 434 MS
R AXIS - MUSE: -27 DEGREES
RBC # BLD AUTO: 6.27 10E6/UL (ref 4.4–5.9)
RBC # BLD AUTO: 6.58 10E6/UL (ref 4.4–5.9)
RBC # BLD AUTO: 6.63 10E6/UL (ref 4.4–5.9)
RBC # BLD AUTO: 6.78 10E6/UL (ref 4.4–5.9)
RBC # BLD AUTO: 6.96 10E6/UL (ref 4.4–5.9)
RBC CAST: 115 /LPF
RBC URINE: 0 /HPF
RBC URINE: 1 /HPF
RBC URINE: >182 /HPF
RHEUMATOID FACT SERPL-ACNC: <10 IU/ML
SODIUM SERPL-SCNC: 133 MMOL/L (ref 135–145)
SODIUM SERPL-SCNC: 134 MMOL/L (ref 135–145)
SODIUM SERPL-SCNC: 136 MMOL/L (ref 135–145)
SP GR UR STRIP: 1.02 (ref 1–1.03)
SQUAMOUS EPITHELIAL: 1 /HPF
SYSTOLIC BLOOD PRESSURE - MUSE: NORMAL MMHG
T AXIS - MUSE: 7 DEGREES
T4 FREE SERPL-MCNC: 1.1 NG/DL (ref 0.9–1.7)
TROPONIN T SERPL HS-MCNC: 15 NG/L
TROPONIN T SERPL HS-MCNC: 16 NG/L
TSH SERPL DL<=0.005 MIU/L-ACNC: 6.09 UIU/ML (ref 0.3–4.2)
UROBILINOGEN UR STRIP-MCNC: 2 MG/DL
UROBILINOGEN UR STRIP-MCNC: 2 MG/DL
UROBILINOGEN UR STRIP-MCNC: NORMAL MG/DL
VENTRICULAR RATE- MUSE: 82 BPM
WBC # BLD AUTO: 10.2 10E3/UL (ref 4–11)
WBC # BLD AUTO: 10.5 10E3/UL (ref 4–11)
WBC # BLD AUTO: 6.4 10E3/UL (ref 4–11)
WBC # BLD AUTO: 8.6 10E3/UL (ref 4–11)
WBC # BLD AUTO: 8.8 10E3/UL (ref 4–11)
WBC CLUMPS #/AREA URNS HPF: PRESENT /HPF
WBC URINE: 177 /HPF
WBC URINE: 21 /HPF
WBC URINE: 4 /HPF

## 2024-01-01 PROCEDURE — 250N000011 HC RX IP 250 OP 636

## 2024-01-01 PROCEDURE — 81001 URINALYSIS AUTO W/SCOPE: CPT | Mod: ZL | Performed by: FAMILY MEDICINE

## 2024-01-01 PROCEDURE — 250N000009 HC RX 250: Performed by: RADIOLOGY

## 2024-01-01 PROCEDURE — 250N000011 HC RX IP 250 OP 636: Performed by: PHYSICIAN ASSISTANT

## 2024-01-01 PROCEDURE — 36415 COLL VENOUS BLD VENIPUNCTURE: CPT | Mod: ZL | Performed by: FAMILY MEDICINE

## 2024-01-01 PROCEDURE — 99417 PROLNG OP E/M EACH 15 MIN: CPT | Performed by: INTERNAL MEDICINE

## 2024-01-01 PROCEDURE — 999N000010 HC STATISTIC ANES STAT CODE-CRNA PER MINUTE: Performed by: SURGERY

## 2024-01-01 PROCEDURE — 250N000013 HC RX MED GY IP 250 OP 250 PS 637: Performed by: RADIOLOGY

## 2024-01-01 PROCEDURE — 343N000001 HC RX 343: Performed by: FAMILY MEDICINE

## 2024-01-01 PROCEDURE — 87340 HEPATITIS B SURFACE AG IA: CPT | Mod: ZL | Performed by: INTERNAL MEDICINE

## 2024-01-01 PROCEDURE — 85025 COMPLETE CBC W/AUTO DIFF WBC: CPT | Mod: ZL | Performed by: FAMILY MEDICINE

## 2024-01-01 PROCEDURE — 84153 ASSAY OF PSA TOTAL: CPT | Mod: ZL | Performed by: FAMILY MEDICINE

## 2024-01-01 PROCEDURE — 82040 ASSAY OF SERUM ALBUMIN: CPT | Performed by: INTERNAL MEDICINE

## 2024-01-01 PROCEDURE — 71260 CT THORAX DX C+: CPT

## 2024-01-01 PROCEDURE — 83615 LACTATE (LD) (LDH) ENZYME: CPT | Mod: ZL | Performed by: INTERNAL MEDICINE

## 2024-01-01 PROCEDURE — 81001 URINALYSIS AUTO W/SCOPE: CPT

## 2024-01-01 PROCEDURE — 86038 ANTINUCLEAR ANTIBODIES: CPT | Mod: ZL | Performed by: INTERNAL MEDICINE

## 2024-01-01 PROCEDURE — 83550 IRON BINDING TEST: CPT | Mod: ZL | Performed by: FAMILY MEDICINE

## 2024-01-01 PROCEDURE — 96413 CHEMO IV INFUSION 1 HR: CPT

## 2024-01-01 PROCEDURE — 77012 CT SCAN FOR NEEDLE BIOPSY: CPT

## 2024-01-01 PROCEDURE — 87186 SC STD MICRODIL/AGAR DIL: CPT | Mod: ZL | Performed by: FAMILY MEDICINE

## 2024-01-01 PROCEDURE — 81001 URINALYSIS AUTO W/SCOPE: CPT | Performed by: PHYSICIAN ASSISTANT

## 2024-01-01 PROCEDURE — 80053 COMPREHEN METABOLIC PANEL: CPT | Mod: ZL | Performed by: FAMILY MEDICINE

## 2024-01-01 PROCEDURE — 86803 HEPATITIS C AB TEST: CPT | Mod: ZL | Performed by: INTERNAL MEDICINE

## 2024-01-01 PROCEDURE — 99100 ANES PT EXTEME AGE<1 YR&>70: CPT

## 2024-01-01 PROCEDURE — 99285 EMERGENCY DEPT VISIT HI MDM: CPT | Mod: 25 | Performed by: PHYSICIAN ASSISTANT

## 2024-01-01 PROCEDURE — 74178 CT ABD&PLV WO CNTR FLWD CNTR: CPT

## 2024-01-01 PROCEDURE — 250N000009 HC RX 250

## 2024-01-01 PROCEDURE — 86431 RHEUMATOID FACTOR QUANT: CPT | Mod: ZL | Performed by: INTERNAL MEDICINE

## 2024-01-01 PROCEDURE — 76700 US EXAM ABDOM COMPLETE: CPT

## 2024-01-01 PROCEDURE — G0463 HOSPITAL OUTPT CLINIC VISIT: HCPCS

## 2024-01-01 PROCEDURE — 85652 RBC SED RATE AUTOMATED: CPT | Mod: ZL | Performed by: INTERNAL MEDICINE

## 2024-01-01 PROCEDURE — 82105 ALPHA-FETOPROTEIN SERUM: CPT | Mod: ZL | Performed by: INTERNAL MEDICINE

## 2024-01-01 PROCEDURE — 85610 PROTHROMBIN TIME: CPT | Mod: ZL

## 2024-01-01 PROCEDURE — 86706 HEP B SURFACE ANTIBODY: CPT | Mod: ZL | Performed by: INTERNAL MEDICINE

## 2024-01-01 PROCEDURE — 99213 OFFICE O/P EST LOW 20 MIN: CPT | Performed by: FAMILY MEDICINE

## 2024-01-01 PROCEDURE — 85025 COMPLETE CBC W/AUTO DIFF WBC: CPT

## 2024-01-01 PROCEDURE — 78815 PET IMAGE W/CT SKULL-THIGH: CPT | Mod: PI

## 2024-01-01 PROCEDURE — 85025 COMPLETE CBC W/AUTO DIFF WBC: CPT | Mod: ZL | Performed by: INTERNAL MEDICINE

## 2024-01-01 PROCEDURE — 36415 COLL VENOUS BLD VENIPUNCTURE: CPT

## 2024-01-01 PROCEDURE — 43239 EGD BIOPSY SINGLE/MULTIPLE: CPT | Performed by: SURGERY

## 2024-01-01 PROCEDURE — 99284 EMERGENCY DEPT VISIT MOD MDM: CPT

## 2024-01-01 PROCEDURE — 80053 COMPREHEN METABOLIC PANEL: CPT

## 2024-01-01 PROCEDURE — 250N000013 HC RX MED GY IP 250 OP 250 PS 637

## 2024-01-01 PROCEDURE — 96376 TX/PRO/DX INJ SAME DRUG ADON: CPT | Mod: XU | Performed by: PHYSICIAN ASSISTANT

## 2024-01-01 PROCEDURE — 93010 ELECTROCARDIOGRAM REPORT: CPT | Performed by: STUDENT IN AN ORGANIZED HEALTH CARE EDUCATION/TRAINING PROGRAM

## 2024-01-01 PROCEDURE — 87086 URINE CULTURE/COLONY COUNT: CPT

## 2024-01-01 PROCEDURE — 99215 OFFICE O/P EST HI 40 MIN: CPT | Performed by: NURSE PRACTITIONER

## 2024-01-01 PROCEDURE — A9552 F18 FDG: HCPCS | Performed by: FAMILY MEDICINE

## 2024-01-01 PROCEDURE — 88305 TISSUE EXAM BY PATHOLOGIST: CPT

## 2024-01-01 PROCEDURE — 80053 COMPREHEN METABOLIC PANEL: CPT | Mod: ZL | Performed by: INTERNAL MEDICINE

## 2024-01-01 PROCEDURE — 36415 COLL VENOUS BLD VENIPUNCTURE: CPT | Performed by: PHYSICIAN ASSISTANT

## 2024-01-01 PROCEDURE — 83735 ASSAY OF MAGNESIUM: CPT

## 2024-01-01 PROCEDURE — 83690 ASSAY OF LIPASE: CPT | Performed by: PHYSICIAN ASSISTANT

## 2024-01-01 PROCEDURE — 99215 OFFICE O/P EST HI 40 MIN: CPT | Performed by: FAMILY MEDICINE

## 2024-01-01 PROCEDURE — 96415 CHEMO IV INFUSION ADDL HR: CPT

## 2024-01-01 PROCEDURE — 250N000011 HC RX IP 250 OP 636: Performed by: FAMILY MEDICINE

## 2024-01-01 PROCEDURE — 80053 COMPREHEN METABOLIC PANEL: CPT | Performed by: PHYSICIAN ASSISTANT

## 2024-01-01 PROCEDURE — 258N000003 HC RX IP 258 OP 636: Performed by: RADIOLOGY

## 2024-01-01 PROCEDURE — 99285 EMERGENCY DEPT VISIT HI MDM: CPT | Performed by: PHYSICIAN ASSISTANT

## 2024-01-01 PROCEDURE — 84439 ASSAY OF FREE THYROXINE: CPT | Performed by: INTERNAL MEDICINE

## 2024-01-01 PROCEDURE — 99205 OFFICE O/P NEW HI 60 MIN: CPT | Performed by: INTERNAL MEDICINE

## 2024-01-01 PROCEDURE — 85049 AUTOMATED PLATELET COUNT: CPT | Mod: ZL

## 2024-01-01 PROCEDURE — 36415 COLL VENOUS BLD VENIPUNCTURE: CPT | Mod: ZL

## 2024-01-01 PROCEDURE — 36415 COLL VENOUS BLD VENIPUNCTURE: CPT | Performed by: INTERNAL MEDICINE

## 2024-01-01 PROCEDURE — G0463 HOSPITAL OUTPT CLINIC VISIT: HCPCS | Mod: 25 | Performed by: FAMILY MEDICINE

## 2024-01-01 PROCEDURE — 81003 URINALYSIS AUTO W/O SCOPE: CPT | Performed by: INTERNAL MEDICINE

## 2024-01-01 PROCEDURE — 43235 EGD DIAGNOSTIC BRUSH WASH: CPT | Performed by: SURGERY

## 2024-01-01 PROCEDURE — 84484 ASSAY OF TROPONIN QUANT: CPT

## 2024-01-01 PROCEDURE — 88312 SPECIAL STAINS GROUP 1: CPT

## 2024-01-01 PROCEDURE — 258N000003 HC RX IP 258 OP 636: Performed by: SURGERY

## 2024-01-01 PROCEDURE — 93005 ELECTROCARDIOGRAM TRACING: CPT

## 2024-01-01 PROCEDURE — 71045 X-RAY EXAM CHEST 1 VIEW: CPT

## 2024-01-01 PROCEDURE — 84443 ASSAY THYROID STIM HORMONE: CPT | Performed by: INTERNAL MEDICINE

## 2024-01-01 PROCEDURE — 258N000003 HC RX IP 258 OP 636: Performed by: INTERNAL MEDICINE

## 2024-01-01 PROCEDURE — 93971 EXTREMITY STUDY: CPT | Mod: RT

## 2024-01-01 PROCEDURE — 82668 ASSAY OF ERYTHROPOIETIN: CPT | Mod: ZL | Performed by: INTERNAL MEDICINE

## 2024-01-01 PROCEDURE — 99285 EMERGENCY DEPT VISIT HI MDM: CPT | Mod: 25

## 2024-01-01 PROCEDURE — 36415 COLL VENOUS BLD VENIPUNCTURE: CPT | Mod: ZL | Performed by: INTERNAL MEDICINE

## 2024-01-01 PROCEDURE — 86704 HEP B CORE ANTIBODY TOTAL: CPT | Mod: ZL,XU | Performed by: INTERNAL MEDICINE

## 2024-01-01 PROCEDURE — 87086 URINE CULTURE/COLONY COUNT: CPT | Mod: ZL | Performed by: FAMILY MEDICINE

## 2024-01-01 PROCEDURE — 83880 ASSAY OF NATRIURETIC PEPTIDE: CPT

## 2024-01-01 PROCEDURE — 96374 THER/PROPH/DIAG INJ IV PUSH: CPT | Mod: XU | Performed by: PHYSICIAN ASSISTANT

## 2024-01-01 PROCEDURE — 43239 EGD BIOPSY SINGLE/MULTIPLE: CPT

## 2024-01-01 PROCEDURE — 250N000011 HC RX IP 250 OP 636: Mod: JZ | Performed by: INTERNAL MEDICINE

## 2024-01-01 PROCEDURE — 85025 COMPLETE CBC W/AUTO DIFF WBC: CPT | Performed by: PHYSICIAN ASSISTANT

## 2024-01-01 PROCEDURE — 88307 TISSUE EXAM BY PATHOLOGIST: CPT

## 2024-01-01 PROCEDURE — 47000 NEEDLE BIOPSY OF LIVER PERQ: CPT

## 2024-01-01 RX ORDER — DIPHENHYDRAMINE HYDROCHLORIDE 50 MG/ML
50 INJECTION INTRAMUSCULAR; INTRAVENOUS
Status: CANCELLED
Start: 2024-01-01

## 2024-01-01 RX ORDER — MORPHINE SULFATE 30 MG/1
30 TABLET, FILM COATED, EXTENDED RELEASE ORAL EVERY 12 HOURS
Qty: 60 TABLET | Refills: 0 | Status: SHIPPED | OUTPATIENT
Start: 2024-01-01 | End: 2024-01-01

## 2024-01-01 RX ORDER — OXYCODONE HYDROCHLORIDE 5 MG/1
5-15 TABLET ORAL EVERY 4 HOURS PRN
Qty: 30 TABLET | Refills: 0 | Status: SHIPPED | OUTPATIENT
Start: 2024-01-01 | End: 2024-01-01

## 2024-01-01 RX ORDER — NALOXONE HYDROCHLORIDE 0.4 MG/ML
0.2 INJECTION, SOLUTION INTRAMUSCULAR; INTRAVENOUS; SUBCUTANEOUS
Status: DISCONTINUED | OUTPATIENT
Start: 2024-01-01 | End: 2024-01-01 | Stop reason: HOSPADM

## 2024-01-01 RX ORDER — METOPROLOL SUCCINATE 25 MG/1
25 TABLET, EXTENDED RELEASE ORAL
COMMUNITY
Start: 2024-01-01

## 2024-01-01 RX ORDER — PROPOFOL 10 MG/ML
INJECTION, EMULSION INTRAVENOUS PRN
Status: DISCONTINUED | OUTPATIENT
Start: 2024-01-01 | End: 2024-01-01

## 2024-01-01 RX ORDER — OXYCODONE HYDROCHLORIDE 5 MG/1
5-15 TABLET ORAL EVERY 4 HOURS PRN
Qty: 30 TABLET | Refills: 0 | Status: ON HOLD | OUTPATIENT
Start: 2024-01-01 | End: 2024-01-01

## 2024-01-01 RX ORDER — AZITHROMYCIN 250 MG/1
TABLET, FILM COATED ORAL
Qty: 6 TABLET | Refills: 0 | Status: SHIPPED | OUTPATIENT
Start: 2024-01-01 | End: 2024-01-01

## 2024-01-01 RX ORDER — EPINEPHRINE 1 MG/ML
0.3 INJECTION, SOLUTION, CONCENTRATE INTRAVENOUS EVERY 5 MIN PRN
Status: CANCELLED | OUTPATIENT
Start: 2024-01-01

## 2024-01-01 RX ORDER — CIPROFLOXACIN 250 MG/1
250 TABLET, FILM COATED ORAL 2 TIMES DAILY
Qty: 20 TABLET | Refills: 0 | Status: SHIPPED | OUTPATIENT
Start: 2024-01-01

## 2024-01-01 RX ORDER — NALOXONE HYDROCHLORIDE 0.4 MG/ML
0.4 INJECTION, SOLUTION INTRAMUSCULAR; INTRAVENOUS; SUBCUTANEOUS
Status: DISCONTINUED | OUTPATIENT
Start: 2024-01-01 | End: 2024-01-01 | Stop reason: HOSPADM

## 2024-01-01 RX ORDER — SPIRONOLACTONE 25 MG/1
25 TABLET ORAL DAILY
Qty: 30 TABLET | Refills: 0 | Status: SHIPPED | OUTPATIENT
Start: 2024-01-01

## 2024-01-01 RX ORDER — ALBUTEROL SULFATE 0.83 MG/ML
2.5 SOLUTION RESPIRATORY (INHALATION)
Status: CANCELLED | OUTPATIENT
Start: 2024-01-01

## 2024-01-01 RX ORDER — OXYCODONE HYDROCHLORIDE 5 MG/1
5 TABLET ORAL EVERY 6 HOURS PRN
Qty: 12 TABLET | Refills: 0 | Status: SHIPPED | OUTPATIENT
Start: 2024-01-01 | End: 2024-01-01

## 2024-01-01 RX ORDER — SUCRALFATE 1 G/1
1 TABLET ORAL 4 TIMES DAILY
Qty: 120 TABLET | Refills: 3 | Status: SHIPPED | OUTPATIENT
Start: 2024-01-01

## 2024-01-01 RX ORDER — METHYLPREDNISOLONE SODIUM SUCCINATE 125 MG/2ML
125 INJECTION, POWDER, LYOPHILIZED, FOR SOLUTION INTRAMUSCULAR; INTRAVENOUS
Status: CANCELLED
Start: 2024-01-01

## 2024-01-01 RX ORDER — HYDROMORPHONE HYDROCHLORIDE 1 MG/ML
0.5 INJECTION, SOLUTION INTRAMUSCULAR; INTRAVENOUS; SUBCUTANEOUS ONCE
Status: COMPLETED | OUTPATIENT
Start: 2024-01-01 | End: 2024-01-01

## 2024-01-01 RX ORDER — FLUDEOXYGLUCOSE F 18 200 MCI/ML
11.6 INJECTION, SOLUTION INTRAVENOUS ONCE
Status: COMPLETED | OUTPATIENT
Start: 2024-01-01 | End: 2024-01-01

## 2024-01-01 RX ORDER — MEPERIDINE HYDROCHLORIDE 50 MG/ML
25 INJECTION INTRAMUSCULAR; INTRAVENOUS; SUBCUTANEOUS EVERY 30 MIN PRN
Status: CANCELLED | OUTPATIENT
Start: 2024-01-01

## 2024-01-01 RX ORDER — ONDANSETRON 2 MG/ML
INJECTION INTRAMUSCULAR; INTRAVENOUS PRN
Status: DISCONTINUED | OUTPATIENT
Start: 2024-01-01 | End: 2024-01-01

## 2024-01-01 RX ORDER — ACETAMINOPHEN 500 MG
500 TABLET ORAL
Status: ON HOLD | COMMUNITY
Start: 2022-11-09 | End: 2024-01-01

## 2024-01-01 RX ORDER — ALBUTEROL SULFATE 90 UG/1
1-2 AEROSOL, METERED RESPIRATORY (INHALATION)
Status: CANCELLED
Start: 2024-01-01

## 2024-01-01 RX ORDER — FLUMAZENIL 0.1 MG/ML
0.2 INJECTION, SOLUTION INTRAVENOUS
Status: DISCONTINUED | OUTPATIENT
Start: 2024-01-01 | End: 2024-01-01 | Stop reason: HOSPADM

## 2024-01-01 RX ORDER — OXYCODONE HYDROCHLORIDE 5 MG/1
5 TABLET ORAL ONCE
Status: COMPLETED | OUTPATIENT
Start: 2024-01-01 | End: 2024-01-01

## 2024-01-01 RX ORDER — MORPHINE SULFATE 15 MG/1
30 TABLET, FILM COATED, EXTENDED RELEASE ORAL EVERY 12 HOURS SCHEDULED
Status: DISCONTINUED | OUTPATIENT
Start: 2024-01-01 | End: 2024-01-01 | Stop reason: HOSPADM

## 2024-01-01 RX ORDER — LIDOCAINE 40 MG/G
CREAM TOPICAL
Status: DISCONTINUED | OUTPATIENT
Start: 2024-01-01 | End: 2024-01-01 | Stop reason: HOSPADM

## 2024-01-01 RX ORDER — PROPOFOL 10 MG/ML
INJECTION, EMULSION INTRAVENOUS CONTINUOUS PRN
Status: DISCONTINUED | OUTPATIENT
Start: 2024-01-01 | End: 2024-01-01

## 2024-01-01 RX ORDER — IOPAMIDOL 755 MG/ML
103 INJECTION, SOLUTION INTRAVASCULAR ONCE
Status: COMPLETED | OUTPATIENT
Start: 2024-01-01 | End: 2024-01-01

## 2024-01-01 RX ORDER — SODIUM CHLORIDE, SODIUM LACTATE, POTASSIUM CHLORIDE, CALCIUM CHLORIDE 600; 310; 30; 20 MG/100ML; MG/100ML; MG/100ML; MG/100ML
INJECTION, SOLUTION INTRAVENOUS CONTINUOUS
Status: DISCONTINUED | OUTPATIENT
Start: 2024-01-01 | End: 2024-01-01 | Stop reason: HOSPADM

## 2024-01-01 RX ORDER — LIDOCAINE HYDROCHLORIDE 20 MG/ML
INJECTION, SOLUTION INFILTRATION; PERINEURAL PRN
Status: DISCONTINUED | OUTPATIENT
Start: 2024-01-01 | End: 2024-01-01

## 2024-01-01 RX ORDER — LORAZEPAM 2 MG/ML
0.5 INJECTION INTRAMUSCULAR EVERY 4 HOURS PRN
Status: CANCELLED | OUTPATIENT
Start: 2024-01-01

## 2024-01-01 RX ORDER — IOPAMIDOL 755 MG/ML
150 INJECTION, SOLUTION INTRAVASCULAR ONCE
Status: COMPLETED | OUTPATIENT
Start: 2024-01-01 | End: 2024-01-01

## 2024-01-01 RX ORDER — SODIUM CHLORIDE 9 MG/ML
INJECTION, SOLUTION INTRAVENOUS CONTINUOUS
Status: DISCONTINUED | OUTPATIENT
Start: 2024-01-01 | End: 2024-01-01 | Stop reason: HOSPADM

## 2024-01-01 RX ORDER — IOPAMIDOL 755 MG/ML
104 INJECTION, SOLUTION INTRAVASCULAR ONCE
Status: COMPLETED | OUTPATIENT
Start: 2024-01-01 | End: 2024-01-01

## 2024-01-01 RX ORDER — FUROSEMIDE 20 MG
1 TABLET ORAL DAILY
COMMUNITY
Start: 2024-01-01

## 2024-01-01 RX ORDER — OXYCODONE HYDROCHLORIDE 5 MG/1
5-10 TABLET ORAL EVERY 6 HOURS PRN
Qty: 12 TABLET | Refills: 0 | Status: SHIPPED | OUTPATIENT
Start: 2024-01-01 | End: 2024-01-01

## 2024-01-01 RX ORDER — OXYCODONE HYDROCHLORIDE 5 MG/1
5-15 TABLET ORAL EVERY 4 HOURS PRN
Qty: 120 TABLET | Refills: 0 | Status: ON HOLD | OUTPATIENT
Start: 2024-01-01 | End: 2024-01-01

## 2024-01-01 RX ORDER — MORPHINE SULFATE 30 MG/1
30 TABLET, FILM COATED, EXTENDED RELEASE ORAL EVERY 12 HOURS
Qty: 60 TABLET | Refills: 0 | Status: SHIPPED | OUTPATIENT
Start: 2024-01-01

## 2024-01-01 RX ORDER — SULFAMETHOXAZOLE/TRIMETHOPRIM 800-160 MG
1 TABLET ORAL 2 TIMES DAILY
Qty: 14 TABLET | Refills: 0 | Status: SHIPPED | OUTPATIENT
Start: 2024-01-01 | End: 2024-01-01

## 2024-01-01 RX ORDER — OXYCODONE HYDROCHLORIDE 5 MG/1
5 TABLET ORAL EVERY 6 HOURS PRN
Qty: 6 TABLET | Refills: 0 | Status: SHIPPED | OUTPATIENT
Start: 2024-01-01 | End: 2024-01-01

## 2024-01-01 RX ADMIN — SODIUM CHLORIDE, POTASSIUM CHLORIDE, SODIUM LACTATE AND CALCIUM CHLORIDE: 600; 310; 30; 20 INJECTION, SOLUTION INTRAVENOUS at 08:24

## 2024-01-01 RX ADMIN — LIDOCAINE HYDROCHLORIDE 9 ML: 10 INJECTION, SOLUTION INFILTRATION; PERINEURAL at 14:34

## 2024-01-01 RX ADMIN — PROPOFOL 50 MG: 10 INJECTION, EMULSION INTRAVENOUS at 08:44

## 2024-01-01 RX ADMIN — SODIUM CHLORIDE 250 ML: 9 INJECTION, SOLUTION INTRAVENOUS at 12:22

## 2024-01-01 RX ADMIN — LIDOCAINE HYDROCHLORIDE 100 MG: 20 INJECTION, SOLUTION INFILTRATION; PERINEURAL at 08:42

## 2024-01-01 RX ADMIN — IOPAMIDOL 104 ML: 755 INJECTION, SOLUTION INTRAVENOUS at 10:51

## 2024-01-01 RX ADMIN — ONDANSETRON HYDROCHLORIDE 4 MG: 2 SOLUTION INTRAMUSCULAR; INTRAVENOUS at 08:42

## 2024-01-01 RX ADMIN — SODIUM CHLORIDE: 9 INJECTION, SOLUTION INTRAVENOUS at 13:42

## 2024-01-01 RX ADMIN — ATEZOLIZUMAB 1200 MG: 1200 INJECTION, SOLUTION INTRAVENOUS at 12:24

## 2024-01-01 RX ADMIN — SODIUM CHLORIDE 1200 MG: 9 INJECTION, SOLUTION INTRAVENOUS at 13:41

## 2024-01-01 RX ADMIN — HYDROMORPHONE HYDROCHLORIDE 0.5 MG: 1 INJECTION, SOLUTION INTRAMUSCULAR; INTRAVENOUS; SUBCUTANEOUS at 09:21

## 2024-01-01 RX ADMIN — FLUDEOXYGLUCOSE F 18 11.6 MILLICURIE: 200 INJECTION, SOLUTION INTRAVENOUS at 14:29

## 2024-01-01 RX ADMIN — HYDROMORPHONE HYDROCHLORIDE 0.5 MG: 1 INJECTION, SOLUTION INTRAMUSCULAR; INTRAVENOUS; SUBCUTANEOUS at 12:01

## 2024-01-01 RX ADMIN — PROPOFOL 125 MCG/KG/MIN: 10 INJECTION, EMULSION INTRAVENOUS at 08:44

## 2024-01-01 RX ADMIN — OXYCODONE HYDROCHLORIDE 5 MG: 5 TABLET ORAL at 13:48

## 2024-01-01 RX ADMIN — PROPOFOL 30 MG: 10 INJECTION, EMULSION INTRAVENOUS at 08:47

## 2024-01-01 RX ADMIN — IOPAMIDOL 103 ML: 755 INJECTION, SOLUTION INTRAVENOUS at 12:35

## 2024-01-01 ASSESSMENT — ENCOUNTER SYMPTOMS
VOMITING: 0
FATIGUE: 0
COUGH: 0
ABDOMINAL PAIN: 1
FEVER: 0
ACTIVITY CHANGE: 1
NAUSEA: 1
FATIGUE: 1
APPETITE CHANGE: 0
SHORTNESS OF BREATH: 1
APPETITE CHANGE: 1
SHORTNESS OF BREATH: 0
DIFFICULTY URINATING: 0

## 2024-01-01 ASSESSMENT — PAIN SCALES - GENERAL
PAINLEVEL: MODERATE PAIN (5)
PAINLEVEL: NO PAIN (0)
PAINLEVEL: NO PAIN (1)
PAINLEVEL: MODERATE PAIN (4)
PAINLEVEL: NO PAIN (1)

## 2024-01-01 ASSESSMENT — ACTIVITIES OF DAILY LIVING (ADL)
ADLS_ACUITY_SCORE: 33
ADLS_ACUITY_SCORE: 35
ADLS_ACUITY_SCORE: 33
ADLS_ACUITY_SCORE: 35
ADLS_ACUITY_SCORE: 33
ADLS_ACUITY_SCORE: 35
ADLS_ACUITY_SCORE: 33
ADLS_ACUITY_SCORE: 35

## 2024-01-01 ASSESSMENT — COLUMBIA-SUICIDE SEVERITY RATING SCALE - C-SSRS
1. IN THE PAST MONTH, HAVE YOU WISHED YOU WERE DEAD OR WISHED YOU COULD GO TO SLEEP AND NOT WAKE UP?: NO
2. HAVE YOU ACTUALLY HAD ANY THOUGHTS OF KILLING YOURSELF IN THE PAST MONTH?: NO
6. HAVE YOU EVER DONE ANYTHING, STARTED TO DO ANYTHING, OR PREPARED TO DO ANYTHING TO END YOUR LIFE?: NO
6. HAVE YOU EVER DONE ANYTHING, STARTED TO DO ANYTHING, OR PREPARED TO DO ANYTHING TO END YOUR LIFE?: NO
2. HAVE YOU ACTUALLY HAD ANY THOUGHTS OF KILLING YOURSELF IN THE PAST MONTH?: NO
1. IN THE PAST MONTH, HAVE YOU WISHED YOU WERE DEAD OR WISHED YOU COULD GO TO SLEEP AND NOT WAKE UP?: NO

## 2024-01-01 ASSESSMENT — PATIENT HEALTH QUESTIONNAIRE - PHQ9
SUM OF ALL RESPONSES TO PHQ QUESTIONS 1-9: 21
10. IF YOU CHECKED OFF ANY PROBLEMS, HOW DIFFICULT HAVE THESE PROBLEMS MADE IT FOR YOU TO DO YOUR WORK, TAKE CARE OF THINGS AT HOME, OR GET ALONG WITH OTHER PEOPLE: VERY DIFFICULT
SUM OF ALL RESPONSES TO PHQ QUESTIONS 1-9: 21

## 2024-01-27 NOTE — COMMUNITY RESOURCES LIST (ENGLISH)
01/27/2024    [employer]  N/A  For questions about this resource list or additional care needs, please contact your primary care clinic or care manager.  Phone: [phone]   Email: N/A   Address: [address] [place] [id number]   Hours: N/A        Financial Stability       Utility payment assistance  1  [employer] [facility] Main Office - Energy Assistance Program Distance: 4.69 miles      Phone/Virtual   [address] [facility] [address] [place] [address]  Language: English  Hours: Mon - Thu 8:00 AM - 4:30 PM , Fri 8:00 AM - 12:00 PM  Fees: Free   Phone: (861) 283-3423 Email: nohemi@[employer] Website: http://www.[employer]          Important Numbers & Websites       Emergency Services   911  City Services   311  Poison Control   (333) 922-6115  Suicide Prevention Lifeline   (170) 667-2597 (TALK)  Child Abuse Hotline   (152) 372-3583 (4-A-Child)  Sexual Assault Hotline   (298) 393-4574 (HOPE)  National Runaway Safeline   (452) 804-8012 (RUNAWAY)  All-Options Talkline   (956) 322-7488  Substance Abuse Referral   (644) 724-3804 (HELP)

## 2024-01-31 NOTE — PROGRESS NOTES
Yani Louis is a 78 year old, presenting for the following health issues:  RECHECK (Lab work)      1/31/2024    10:42 AM   Additional Questions   Roomed by Leanne Hargrove LPN     History of Present Illness       Reason for visit:  Check previous elevations of ALT, AST. Nydia consumes 0 sweetened beverage(s) daily.He exercises with enough effort to increase his heart rate 30 to 60 minutes per day.  He exercises with enough effort to increase his heart rate 7 days per week.   He is taking medications regularly.                     Objective    /86   Pulse 67   Temp 97.1  F (36.2  C) (Temporal)   Resp 16   Wt 83.5 kg (184 lb)   SpO2 96%   BMI 29.03 kg/m    Body mass index is 29.03 kg/m .  Physical Exam               Signed Electronically by: Maurice Agustin MD

## 2024-01-31 NOTE — PROGRESS NOTES
There are no exam notes on file for this visit.  SUBJECTIVE:  Heriberto Noriega  is a 78 year old male who comes in today for follow-up of elevated lab studies.  He had minimally elevated ALT.  He had hemoglobin of 18.  No evidence in the past of iron storage disease.  His PSA was 0.11 whereas the year before it had been 0.01.  We did change to a different assay.    He had trouble with hepatic steatosis in the past.  Lost a bunch of weight and it got better and now has gained a little bit.    Past Medical, Family, and Social History reviewed and updated as noted below.   ROS is negative except as noted above       Allergies   Allergen Reactions    Aspirin Hives   ,   Family History   Problem Relation Age of Onset    Heart Disease Father          from CHF    Breast Cancer Mother          from Breast Cancer    Other - See Comments Brother         Agent orange exposure    Breast Cancer Sister     Heart Disease Brother         Heart Disease,cardiac surgery, CABG    Other - See Comments Brother          polio    Cancer Brother         Cancer, from lung cancer   ,   Current Outpatient Medications   Medication    alfuzosin ER (UROXATRAL) 10 MG 24 hr tablet    co-enzyme Q-10 50 MG CAPS    meclizine (ANTIVERT) 12.5 MG tablet    rosuvastatin (CRESTOR) 20 MG tablet    scopolamine (TRANSDERM) 1 MG/3DAYS 72 hr patch     No current facility-administered medications for this visit.   ,   Past Medical History:   Diagnosis Date    Cholelithiasis     Coronary artery calcification     Dietary counseling and surveillance     Low carbohydrate diet.    Dorsalgia     2005,with radicular symptoms    Enlarged prostate without lower urinary tract symptoms (luts)     Benign with mild urinary obstruction symptoms.    Hyperlipidemia     Malignant neoplasm of prostate (H)     2011,He completed radiation treatment for prostate cancer on .  Initially had Radioactive palladium seed implants done followed by  external beam radiation    Secondary polycythemia     3/29/2017   ,   Patient Active Problem List    Diagnosis Date Noted    Dilatation of thoracic aorta, 4 cm () 2020     Priority: Medium    Coronary artery calcification seen on CT scan, Score 1504 2018     Priority: Medium    Chest pain 2018     Priority: Medium    Obesity 2018     Priority: Medium    Polycythemia 2017     Priority: Medium    Counseling regarding advanced directives and goals of care 07/10/2015     Priority: Medium     Overview:   Wants only comfort cares if not cognitively intact. Does not want to go to skilled nursing facility.      ACP (advance care planning) 12/10/2013     Priority: Medium    Malignant neoplasm of prostate (H) 2011     Priority: Medium     Overview:   Completed radiation therapy in 2012 at [facility]      Hyperlipidemia 10/04/2011     Priority: Medium    Hearing loss, sensorineural 11/10/2009     Priority: Medium    Osteoarthrosis 11/10/2009     Priority: Medium    Seborrheic keratosis 11/10/2009     Priority: Medium    Tinnitus 11/10/2009     Priority: Medium   ,   Past Surgical History:   Procedure Laterality Date    COLONOSCOPY       Hyperplastic polyp at 20 cm.    COLONOSCOPY      05,next colonoscopy due in     COLONOSCOPY  2015    Follow up 10 years 2025, hyperplastic    PROSTATE SURGERY      Seeds and radiation    and   Social History     Tobacco Use    Smoking status: Former     Types: Cigarettes     Quit date: 1979     Years since quittin.1    Smokeless tobacco: Never   Substance Use Topics    Alcohol use: Yes     Comment: very rare     OBJECTIVE:  There were no vitals taken for this visit.   EXAM:  Alert cooperative, no distress.  Affect is broad range and appropriate.    Results for orders placed or performed in visit on 24   Comprehensive metabolic panel     Status: Abnormal   Result Value Ref Range    Sodium 136 135 - 145  mmol/L    Potassium 4.1 3.4 - 5.3 mmol/L    Carbon Dioxide (CO2) 23 22 - 29 mmol/L    Anion Gap 11 7 - 15 mmol/L    Urea Nitrogen 18.1 8.0 - 23.0 mg/dL    Creatinine 0.74 0.67 - 1.17 mg/dL    GFR Estimate >90 >60 mL/min/1.73m2    Calcium 9.8 8.8 - 10.2 mg/dL    Chloride 102 98 - 107 mmol/L    Glucose 100 (H) 70 - 99 mg/dL    Alkaline Phosphatase 184 (H) 40 - 150 U/L    AST 84 (H) 0 - 45 U/L    ALT 48 0 - 70 U/L    Protein Total 7.4 6.4 - 8.3 g/dL    Albumin 4.3 3.5 - 5.2 g/dL    Bilirubin Total 1.4 (H) <=1.2 mg/dL   Iron & Iron Binding Capacity     Status: Normal   Result Value Ref Range    Iron 97 61 - 157 ug/dL    Iron Binding Capacity 315 240 - 430 ug/dL    Iron Sat Index 31 15 - 46 %   PSA tumor marker     Status: Normal   Result Value Ref Range    PSA Tumor Marker 0.04 0.00 - 6.50 ng/mL    Narrative    This result is obtained using the Roche Elecsys total PSA method on the yaquelin e601 immunoassay analyzer. Results obtained with different assay methods or kits cannot be used interchangeably.   CBC with platelets and differential     Status: Abnormal   Result Value Ref Range    WBC Count 6.4 4.0 - 11.0 10e3/uL    RBC Count 6.27 (H) 4.40 - 5.90 10e6/uL    Hemoglobin 18.9 (H) 13.3 - 17.7 g/dL    Hematocrit 54.8 (H) 40.0 - 53.0 %    MCV 87 78 - 100 fL    MCH 30.1 26.5 - 33.0 pg    MCHC 34.5 31.5 - 36.5 g/dL    RDW 15.3 (H) 10.0 - 15.0 %    Platelet Count 153 150 - 450 10e3/uL    % Neutrophils 70 %    % Lymphocytes 18 %    % Monocytes 8 %    % Eosinophils 3 %    % Basophils 1 %    % Immature Granulocytes 0 %    NRBCs per 100 WBC 0 <1 /100    Absolute Neutrophils 4.5 1.6 - 8.3 10e3/uL    Absolute Lymphocytes 1.1 0.8 - 5.3 10e3/uL    Absolute Monocytes 0.5 0.0 - 1.3 10e3/uL    Absolute Eosinophils 0.2 0.0 - 0.7 10e3/uL    Absolute Basophils 0.1 0.0 - 0.2 10e3/uL    Absolute Immature Granulocytes 0.0 <=0.4 10e3/uL    Absolute NRBCs 0.0 10e3/uL   CBC with Platelets & Differential     Status: Abnormal    Narrative    The  following orders were created for panel order CBC with Platelets & Differential.  Procedure                               Abnormality         Status                     ---------                               -----------         ------                     CBC with platelets and d...[[id number]]  Abnormal            Final result                 Please view results for these tests on the individual orders.      ASSESSMENT/Plan :    Diagnoses and all orders for this visit:    Elevated LFTs  -     Comprehensive metabolic panel; Future  -     Iron & Iron Binding Capacity; Future    Polycythemia  -     CBC with Platelets & Differential; Future  -     Iron & Iron Binding Capacity; Future    Malignant neoplasm of prostate (H)  -     PSA tumor marker; Future    No evidence of any iron storage issues.  Hemoglobin is still little elevated.  LFTs are in the same range.  PSA is actually lower which is reassuring.  Will continue to employ expectant management and recheck his labs after he loses some weight.      Maurice Agustin MD    Answers submitted by the patient for this visit:  General Questionnaire (Submitted on 1/27/2024)  Chief Complaint: Chronic problems general questions HPI Form  What is the reason for your visit today? : check previous elevations of ALT, AST. PSA  On average, how many sweetened beverages do you drink each day (Examples: soda, juice, sweet tea, etc.  Do NOT count diet or artificially sweetened beverages)?: 0  How many minutes a day do you exercise enough to make your heart beat faster?: 30 to 60  How many days a week do you exercise enough to make your heart beat faster?: 7  How many days per week do you miss taking your medication?: 0

## 2024-01-31 NOTE — COMMUNITY RESOURCES LIST (ENGLISH)
01/31/2024    [facility]  N/A  For questions about this resource list or additional care needs, please contact your primary care clinic or care manager.  Phone: [phone]   Email: N/A   Address: [address] [place] [id number]   Hours: N/A        Financial Stability       Utility payment assistance  1  [facility] Main Office - Energy Assistance Program Distance: 4.69 miles      Phone/Virtual   [address] [facility] [address] [place] [address]  Language: English  Hours: Mon - Thu 8:00 AM - 4:30 PM , Fri 8:00 AM - 12:00 PM  Fees: Free   Phone: (437) 675-1718 Email: nohemi@[employer] Website: http://www.[employer]          Important Numbers & Websites       Emergency Services   911  City Services   311  Poison Control   (815) 456-2641  Suicide Prevention Lifeline   (967) 954-6611 (TALK)  Child Abuse Hotline   (397) 823-6564 (4-A-Child)  Sexual Assault Hotline   (705) 973-3762 (HOPE)  National Runaway Safeline   (106) 520-5753 (RUNAWAY)  All-Options Talkline   (997) 832-5360  Substance Abuse Referral   (837) 801-7860 (HELP)

## 2024-01-31 NOTE — NURSING NOTE
"Chief Complaint   Patient presents with    RECHECK     Lab work       Initial /86   Pulse 67   Temp 97.1  F (36.2  C) (Temporal)   Resp 16   Wt 83.5 kg (184 lb)   SpO2 96%   BMI 29.03 kg/m   Estimated body mass index is 29.03 kg/m  as calculated from the following:    Height as of 11/15/23: 1.695 m (5' 6.75\").    Weight as of this encounter: 83.5 kg (184 lb).  Medication Review: complete    The next two questions are to help us understand your food security.  If you are feeling you need any assistance in this area, we have resources available to support you today.          1/31/2024   SDOH- Food Insecurity   Within the past 12 months, did you worry that your food would run out before you got money to buy more? N   Within the past 12 months, did the food you bought just not last and you didn t have money to get more? N         Health Care Directive:  Patient does not have a Health Care Directive or Living Will: Discussed advance care planning with patient; however, patient declined at this time.    Leanne Hargrove LPN      "

## 2024-01-31 NOTE — TELEPHONE ENCOUNTER
JVC-patient has some questions or a follow up response to JVC from a suggestion he was given at appt 01.31.24    Please call and advise    Thank You    Rosey Sun on 1/31/2024 at 12:52 PM

## 2024-02-01 NOTE — TELEPHONE ENCOUNTER
The patient was given the information he needed.  Leanne Hargrove LPN..................2/1/2024   4:10 PM

## 2024-02-01 NOTE — TELEPHONE ENCOUNTER
No answer and voicemail not set up yet. I will try later.  Leanne Hargrove LPN..................2/1/2024   12:01 PM

## 2024-04-08 NOTE — DISCHARGE INSTRUCTIONS
Message left for Dr. Agustin to set up an appointment, call clinic tomorrow. If you are unable to get in with him please set up an appointment with another chosen provider this week to discuss liver biopsy, treatment etc. Please get an appointment this week.   Return here if symptoms worsen worsening pain, nausea, vomiting, fever, dizziness, light-headness, chest pain.   Oxycodone sent to pharmacy every 6 hours as needed for pain- do not drive, drink alcohol, on this medication. Side effects nausea, vomiting, dizziness, drowsiness, itching, constipation.

## 2024-04-08 NOTE — ED PROVIDER NOTES
History     Chief Complaint   Patient presents with    Flank Pain     Started saturday night     The history is provided by the patient.     Heriberto Noriega is a 78 year old male who presented to the emergency department ambulatory along with wife for evaluation of right-sided abdominal pain.  Pain has been ongoing for several days.  He tells me that approximately 3 weeks ago he was lifting a pan and developed pain in his right lower quadrant as well as right shoulder simultaneously.  He denies any jaundice.  Denies any vomiting.  Pain has slowly localized to the right upper quadrant.  He has had no intra-abdominal surgeries.  He has no chest pain.  He has no fevers.  In discussing his review of systems he does endorse changes in his urine color.  Pain was not maximal in onset.  Is not described as ripping or tearing.    Allergies:  Allergies   Allergen Reactions    Aspirin Hives       Problem List:    Patient Active Problem List    Diagnosis Date Noted    Dilatation of thoracic aorta, 4 cm (6/20) 07/01/2020     Priority: Medium    Coronary artery calcification seen on CT scan, Score 1504 06/07/2018     Priority: Medium    Chest pain 05/24/2018     Priority: Medium    Obesity 02/02/2018     Priority: Medium    Polycythemia 03/29/2017     Priority: Medium    Counseling regarding advanced directives and goals of care 07/10/2015     Priority: Medium     Overview:   Wants only comfort cares if not cognitively intact. Does not want to go to skilled nursing facility.      ACP (advance care planning) 12/10/2013     Priority: Medium    Malignant neoplasm of prostate (H) 11/21/2011     Priority: Medium     Overview:   Completed radiation therapy in August of 2012 at [facility]      Hyperlipidemia 10/04/2011     Priority: Medium    Hearing loss, sensorineural 11/10/2009     Priority: Medium    Osteoarthrosis 11/10/2009     Priority: Medium    Seborrheic keratosis 11/10/2009     Priority: Medium    Tinnitus  11/10/2009     Priority: Medium        Past Medical History:    Past Medical History:   Diagnosis Date    Cholelithiasis     Coronary artery calcification     Dietary counseling and surveillance     Dorsalgia     Enlarged prostate without lower urinary tract symptoms (luts)     Hyperlipidemia     Malignant neoplasm of prostate (H)     Secondary polycythemia        Past Surgical History:    Past Surgical History:   Procedure Laterality Date    COLONOSCOPY       Hyperplastic polyp at 20 cm.    COLONOSCOPY      05,next colonoscopy due in     COLONOSCOPY  2015    Follow up 10 years 2025, hyperplastic    PROSTATE SURGERY      Seeds and radiation       Family History:    Family History   Problem Relation Age of Onset    Heart Disease Father          from CHF    Breast Cancer Mother          from Breast Cancer    Other - See Comments Brother         Agent orange exposure    Breast Cancer Sister     Heart Disease Brother         Heart Disease,cardiac surgery, CABG    Other - See Comments Brother          polio    Cancer Brother         Cancer, from lung cancer       Social History:  Marital Status:   [2]  Social History     Tobacco Use    Smoking status: Former     Types: Cigarettes     Quit date: 1979     Years since quittin.2     Passive exposure: Never    Smokeless tobacco: Never   Vaping Use    Vaping Use: Never used   Substance Use Topics    Alcohol use: Yes     Comment: very rare    Drug use: No     Comment: Drug use: No        Medications:    alfuzosin ER (UROXATRAL) 10 MG 24 hr tablet  co-enzyme Q-10 50 MG CAPS  meclizine (ANTIVERT) 12.5 MG tablet  rosuvastatin (CRESTOR) 20 MG tablet  scopolamine (TRANSDERM) 1 MG/3DAYS 72 hr patch          Review of Systems   Constitutional:  Negative for appetite change, fatigue and fever.   Respiratory:  Negative for cough and shortness of breath.    Cardiovascular:  Negative for chest pain.   Gastrointestinal:  Positive for  "abdominal pain.   Genitourinary:         See HPI   Skin: Negative.        Physical Exam   BP: (!) 150/94  Pulse: 80  Temp: 97.5  F (36.4  C)  Resp: 18  Height: 170.2 cm (5' 7\")  Weight: 81.6 kg (180 lb)  SpO2: 96 %      Physical Exam  Vitals and nursing note reviewed.   Constitutional:       General: He is not in acute distress.     Appearance: Normal appearance. He is normal weight. He is not ill-appearing, toxic-appearing or diaphoretic.      Comments: Pleasant and talkative 78-year-old male found semireclined in no distress   Cardiovascular:      Rate and Rhythm: Normal rate and regular rhythm.   Pulmonary:      Effort: Pulmonary effort is normal.   Abdominal:      Palpations: Abdomen is soft.      Tenderness: There is abdominal tenderness.       Skin:     General: Skin is warm and dry.      Capillary Refill: Capillary refill takes less than 2 seconds.   Neurological:      General: No focal deficit present.      Mental Status: He is alert and oriented to person, place, and time.         ED Course     ED Course as of 04/08/24 1101   Mon Apr 08, 2024   0923 Broad differential diagnosis is considered and includes but is not limited to acute cholecystitis, biliary colic, choledocholithiasis, acute appendicitis, small bowel obstruction, aortic dissection, mesenteric ischemia, pyelonephritis, pneumonia, musculoskeletal source such as shingles.   0923 Based on the current presentation as well as subjective symptoms high likelihood of choledocholithiasis.  Plan will be for serum testing as well as right upper quadrant ultrasound.   0938 Polycythemia   1039 Discussion with the patient.  Patient's laboratory evaluations are concerning for underlying likely metastatic malignancy.  Plan will be for CT scan of the chest abdomen and pelvis to further delineate etiologies of pain and abnormal ultrasound.     Procedures              Critical Care time:  none               Results for orders placed or performed during the " hospital encounter of 04/08/24 (from the past 24 hour(s))   CBC with platelets differential    Narrative    The following orders were created for panel order CBC with platelets differential.  Procedure                               Abnormality         Status                     ---------                               -----------         ------                     CBC with platelets and d...[[id number]]  Abnormal            Final result                 Please view results for these tests on the individual orders.   Comprehensive metabolic panel   Result Value Ref Range    Sodium 136 135 - 145 mmol/L    Potassium 3.9 3.4 - 5.3 mmol/L    Carbon Dioxide (CO2) 27 22 - 29 mmol/L    Anion Gap 10 7 - 15 mmol/L    Urea Nitrogen 12.7 8.0 - 23.0 mg/dL    Creatinine 0.71 0.67 - 1.17 mg/dL    GFR Estimate >90 >60 mL/min/1.73m2    Calcium 9.8 8.8 - 10.2 mg/dL    Chloride 99 98 - 107 mmol/L    Glucose 103 (H) 70 - 99 mg/dL    Alkaline Phosphatase 309 (H) 40 - 150 U/L     (H) 0 - 45 U/L    ALT 62 0 - 70 U/L    Protein Total 7.2 6.4 - 8.3 g/dL    Albumin 3.9 3.5 - 5.2 g/dL    Bilirubin Total 1.5 (H) <=1.2 mg/dL   Lipase   Result Value Ref Range    Lipase 40 13 - 60 U/L   CBC with platelets and differential   Result Value Ref Range    WBC Count 8.8 4.0 - 11.0 10e3/uL    RBC Count 6.78 (H) 4.40 - 5.90 10e6/uL    Hemoglobin 19.4 (H) 13.3 - 17.7 g/dL    Hematocrit 59.7 (H) 40.0 - 53.0 %    MCV 88 78 - 100 fL    MCH 28.6 26.5 - 33.0 pg    MCHC 32.5 31.5 - 36.5 g/dL    RDW 18.2 (H) 10.0 - 15.0 %    Platelet Count 191 150 - 450 10e3/uL    % Neutrophils 79 %    % Lymphocytes 11 %    % Monocytes 8 %    % Eosinophils 1 %    % Basophils 1 %    % Immature Granulocytes 1 %    NRBCs per 100 WBC 0 <1 /100    Absolute Neutrophils 6.9 1.6 - 8.3 10e3/uL    Absolute Lymphocytes 1.0 0.8 - 5.3 10e3/uL    Absolute Monocytes 0.7 0.0 - 1.3 10e3/uL    Absolute Eosinophils 0.1 0.0 - 0.7 10e3/uL    Absolute Basophils 0.1 0.0 - 0.2 10e3/uL    Absolute  Immature Granulocytes 0.1 <=0.4 10e3/uL    Absolute NRBCs 0.0 10e3/uL   Extra Tube (Vaughn Draw)    Narrative    The following orders were created for panel order Extra Tube (Vaughn Draw).  Procedure                               Abnormality         Status                     ---------                               -----------         ------                     Extra Blue Top Tube[[id number]]                                                         Extra Red Top Tube[[id number]]                               Final result               Extra Green Top (Lithium...[[id number]]                      Final result                 Please view results for these tests on the individual orders.   Extra Red Top Tube   Result Value Ref Range    Hold Specimen JIC    Extra Green Top (Lithium Heparin) ON ICE   Result Value Ref Range    Hold Specimen JIC    US Abdomen Complete    Narrative    EXAMINATION: US ABDOMEN COMPLETE, 4/8/2024 10:19 AM     COMPARISON: CT chest 6/1/2018    HISTORY: Transaminitis, right upper quadrant pain, dark urine    TECHNIQUE: The abdomen was scanned in standard fashion with  specialized ultrasound transducer(s) using both grey scale and limited  color Doppler techniques.    FINDINGS:  Liver: The liver demonstrates heterogeneously coarsened echotexture.  Ill-defined echogenic foci with some surrounding hypoechogenicity, one  is located in the left lobe and measures 2.5 x 2.0 cm and the other is  located in the right lobe and measures 8.1 x 8.0 cm. Somewhat nodular  contour of the liver. Main portal vein is dilated, measures up to 1.4  cm, demonstrates appropriate flow towards the liver.    Gallbladder: The gallbladder is well distended and of normal  morphology. There is no wall thickening. There is pericholecystic  fluid, sonographic Gibson's sign and evidence for cholelithiasis.    Bile Ducts: Both the intra- and extrahepatic biliary system are of  normal caliber.  The common bile duct measures 2 mm in  diameter.    Pancreas: Visualized portions of the head and body of the pancreas are  unremarkable.     Right kidney:  11.5 cm. No focal soft tissue mass. Cortical medullary  differentiation is within normal limits. No hydronephrosis. No  shadowing renal calculi.    Left kidney:  12.4 cm. No focal soft tissue mass. Cortical medullary  differentiation is within normal limits. No hydronephrosis. No  shadowing renal calculi.    Spleen: The spleen is mildly enlarged, measuring 13.6 cm in maximum  dimension.    Aorta and IVC:  The visualized portions of the aorta and IVC are  unremarkable.     Fluid: No evidence of pleural effusions.      Impression    IMPRESSION:   1.  Cholelithiasis without definite acute cholecystitis. No  gallbladder wall thickening. Trace pericholecystic fluid is favored  secondary to underlying liver disease.  2.  There are 2 echogenic foci in the liver, concerning for potential  masses. Recommend better characterization with CT and/or MRI.  3.  Hepatic parenchymal changes likely representing underlying liver  disease such as cirrhosis.    REFUGIO CHOUDHURY MD         SYSTEM ID:  [id number]   UA with Microscopic reflex to Culture    Specimen: Urine, Midstream   Result Value Ref Range    Color Urine Yellow Colorless, Straw, Light Yellow, Yellow    Appearance Urine Clear Clear    Glucose Urine Negative Negative mg/dL    Bilirubin Urine Negative Negative    Ketones Urine Negative Negative mg/dL    Specific Gravity Urine 1.017 1.000 - 1.030    Blood Urine Negative Negative    pH Urine 5.5 5.0 - 9.0    Protein Albumin Urine Negative Negative mg/dL    Urobilinogen Urine Normal Normal, 2.0 mg/dL    Nitrite Urine Negative Negative    Leukocyte Esterase Urine Negative Negative    Mucus Urine Present (A) None Seen /LPF    RBC Urine 1 <=2 /HPF    WBC Urine 4 <=5 /HPF    Narrative    Urine Culture not indicated       Medications   HYDROmorphone (PF) (DILAUDID) injection 0.5 mg (0.5 mg Intravenous $Given 4/8/24 7900)    iopamidol (ISOVUE-370) solution 104 mL (104 mLs Intravenous $Given 4/8/24 1058)       Assessments & Plan (with Medical Decision Making)   78-year-old male with an approximate 3-week history of right-sided abdominal discomfort as well as some right-sided chest pain.  Concerning past medical history of for possible choledocholithiasis but unfortunately has an abnormal ultrasound showing 2 large liver masses.  Emergency department workup extended to cross-sectional imaging of the chest and abdomen and pelvis to hopefully radiate to the etiology of the patient's abnormalities.  Obviously concern for metastatic disease causing his symptoms.  No concerning evidence at this time for acute cholecystitis or other acute choledocholithiasis or acute cholangitis.  Discussion with the patient.  Signed out to Harriet Carlson NP at routine change of shift.     This document was prepared using a combination of typing and voice generated software.  While every attempt was made for accuracy, spelling and grammatical errors may exist.     I have reviewed the nursing notes.    I have reviewed the findings, diagnosis, plan and need for follow up with the patient.           Medical Decision Making  The patient's presentation was of moderate complexity (an undiagnosed new problem with uncertain diagnosis).    The patient's evaluation involved:  ordering and/or review of 3+ test(s) in this encounter (multiple labs and images)    The patient's management necessitated high risk (a parenteral controlled substance).        New Prescriptions    No medications on file       Final diagnoses:   Transaminitis   Polycythemia   Liver mass   Right upper quadrant pain       4/8/2024   [facility] [place]       Amy Walker PA-C  04/08/24 9560

## 2024-04-08 NOTE — TELEPHONE ENCOUNTER
"Patient refuses 911 and states he is capable of driving himself. ER notified that patient is to arrive about 0950. Holly Gordon RN on 4/8/2024 at 8:27 AM       Reason for Disposition   Chest pain lasting longer than 5 minutes and ANY of the following:         Pain is crushing, pressure-like, or heavy         Over 44 years old          Over 30 years old and one cardiac risk factor (e.g diabetes, high blood pressure, high cholesterol, smoker, or family history of heart disease)         History of heart disease (e.g. angina, heart attack, heart failure, bypass surgery, takes nitroglycerin)    Additional Information   Negative: SEVERE difficulty breathing (e.g., struggling for each breath, speaks in single words)   Negative: Difficult to awaken or acting confused (e.g., disoriented, slurred speech)   Negative: Shock suspected (e.g., cold/pale/clammy skin, too weak to stand, low BP, rapid pulse)   Negative: Passed out (i.e., lost consciousness, collapsed and was not responding)    Answer Assessment - Initial Assessment Questions  1. LOCATION: \"Where does it hurt?\"        Right sided abdomen, under rib cage, toward the spine, radiating into right shoulder     2. RADIATION: \"Does the pain go anywhere else?\" (e.g., into neck, jaw, arms, back)      As above     3. ONSET: \"When did the chest pain begin?\" (Minutes, hours or days)       Saturday night, difficulty raking a deep breath because of it     4. PATTERN: \"Does the pain come and go, or has it been constant since it started?\"  \"Does it get worse with exertion?\"       \"It's pretty much constant.\"     5. DURATION: \"How long does it last\" (e.g., seconds, minutes, hours)      As above     6. SEVERITY: \"How bad is the pain?\"  (e.g., Scale 1-10; mild, moderate, or severe)     - MILD (1-3): doesn't interfere with normal activities      - MODERATE (4-7): interferes with normal activities or awakens from sleep     - SEVERE (8-10): excruciating pain, unable to do any normal " "activities        8/10     7. CARDIAC RISK FACTORS: \"Do you have any history of heart problems or risk factors for heart disease?\" (e.g., angina, prior heart attack; diabetes, high blood pressure, high cholesterol, smoker, or strong family history of heart disease)      History of heart disease     8. PULMONARY RISK FACTORS: \"Do you have any history of lung disease?\"  (e.g., blood clots in lung, asthma, emphysema, birth control pills)      Denies     9. CAUSE: \"What do you think is causing the chest pain?\"      Has been unwell for two months; started with gastric discomfort after eating, has felt like he has to belch to relieve pressure, applying heating pad to back because of pain, he had a critical episode he may have misdiagnosed entirely - above hip bone above belt line had a pain as well as by his shoulder, migrated around the right side for 3-4 days, persisted x2 weeks before it gradually went away - patient attributed this to lifting a heavy [employer] oven and thought he strained his diaphragm muscles.     10. OTHER SYMPTOMS: \"Do you have any other symptoms?\" (e.g., dizziness, nausea, vomiting, sweating, fever, difficulty breathing, cough)        Restricted breathing, has a history of liver issues     11. PREGNANCY: \"Is there any chance you are pregnant?\" \"When was your last menstrual period?\"        no    Protocols used: Chest Pain-A-[place]    "

## 2024-04-08 NOTE — ED TRIAGE NOTES
About 3 weeks ago was lifting something heavy and felt severe pain in his Rt groin and shoulder at the same time. Felt like he got punched in groin and a knife coming down on his shoulder.  Now the pain is in his right side radiating into his right shoulder.  It was an 8/10 overnight and now with rest is a 4/10.  His liver labs have been high end of normal per pt.       Triage Assessment (Adult)       Row Name 04/08/24 0902          Triage Assessment    Airway WDL WDL        Respiratory WDL    Respiratory WDL all     Rhythm/Pattern, Respiratory shortness of breath        Skin Circulation/Temperature WDL    Skin Circulation/Temperature WDL WDL        Cardiac WDL    Cardiac WDL WDL        Peripheral/Neurovascular WDL    Peripheral Neurovascular WDL WDL        Cognitive/Neuro/Behavioral WDL    Cognitive/Neuro/Behavioral WDL WDL

## 2024-04-08 NOTE — TELEPHONE ENCOUNTER
Pt seen in ED today, the patient has a new liver mass. Harriet Carlson CNP said the patient needs to be seen this week. The patient only wants to see Dr. Agustin.  Is it okay to use the provider add spot 4/10 @11:20?     Thank you, Any Younger on 4/8/2024 at 1:17 PM

## 2024-04-08 NOTE — ED PROVIDER NOTES
11:13 AM Care resumed from Amy HEAD to resume care at end of shift report obtained:     Patient presented to the ER today complaining of right sided abdominal pain that has been going on for several days. Three weeks ago he was lifting a pan and developed right lower quadrant/right shoulder pain suddenly. Pain moved to the right upper abdomen. He also has been having dark urine. No chest pain, fevers. Pain is not ripping/tearing in nature.  Vitals have been stable here.   Past Medical History:   Diagnosis Date    Cholelithiasis     Coronary artery calcification     Dietary counseling and surveillance     Low carbohydrate diet.    Dorsalgia     12/13/2005,with radicular symptoms    Enlarged prostate without lower urinary tract symptoms (luts)     Benign with mild urinary obstruction symptoms.    Hyperlipidemia     Malignant neoplasm of prostate (H)     11/21/2011,He completed radiation treatment for prostate cancer on August 17 of 2012.  Initially had Radioactive palladium seed implants done followed by external beam radiation    Secondary polycythemia     3/29/2017        Work up today has found polycythemia(has history of this) elevated alk phos, AST, bilirubin. Pending CT abdomen pelvis following results of ultrasound of RUQ: liver mass    11:49 AM: Patient is non-distressed, alert, orientated. Breathing non-labored. He is not ill appearing. He tells me that his right sided abdominal/shoulder pain is starting to return and it is uncomfortable. This is the same pain he presented to the ER with today. He reports some relief after IV dilaudid that he received earlier and would like another dose.     CT abd/pelvis results as interpreted by radiologist :Clear chest.  ABDOMEN AND PELVIS: Large poorly defined hepatic mass involving the central right lobe of the liver, concerning for primary hepatic malignancy versus metastasis. In addition there is a smaller suspicious lesion in the left lobe of liver. There is  mass effect upon the intrahepatic portion of the inferior  vena cava. See description above for further comment. There is otherwise no evidence of metastatic disease within the abdomen or pelvis. There is a small volume of free fluid in the pelvis. There is an infrarenal abdominal aortic aneurysm measuring 3.8 cm.    12:30: Consulted with hepatology/ oncology at [facility], Dr. Ayala regarding patient. I Verbally discussed CT results with him. If patient is not having symptoms of venous insufficiency I.e. lower extremity edema (no evidence of this today) based off my assessment of patient and review of results today, he recommended the patient have a close follow up and have liver biopsy, cancer markers with his primary care provider in clinic. Question if right upper quadrant pain is related to possible stretching of the hepatic capsule due to mass.      Plan: I reviewed the CT results with patient and his wife, and after shared decision making with the patient and his wife, patient preferred to follow up with his current primary provider in the clinic, Dr. Agustin, to discuss further care and treatment options. I told him I would do my best to set him up this week with Dr. Agustin, however, he may need to make an appointment with someone else (he discussed perhaps seeing Dr. Parnell in the future for PCP) due to availability, he is understanding of this. Patient does feel comfortable discharging home.  He is reporting pain control at time of discharge, but tells me it has not completely gone away. Oxycodone sent to pharmacy for pain. I discussed side effects of medication with patient and his wife, advised not to drive. Discussed not taking tylenol at this time.Advised to return to be seen sooner if symptoms worsen.   Patient to discharge home in stable condition,ambulatory,  verbal understanding of discharge instructions given from both patient and wife.       Final diagnoses:   Transaminitis    Polycythemia   Liver mass   Right upper quadrant pain       4/8/2024   [facility] AND [facility] Harriet, APRN CNP  04/08/24 2040

## 2024-04-09 NOTE — TELEPHONE ENCOUNTER
The patient stated he was told it is too early to fill his pain medication. He  does not need it now but does not want to not have any in the middle of the night. Can the directions be changed so he can get an Rx later today so he has them in the night?  Leanne Hargrove LPN..................4/9/2024   10:08 AM

## 2024-04-09 NOTE — TELEPHONE ENCOUNTER
The patient was told the below information.  Leanne Hargrove LPN..................4/9/2024   9:56 AM

## 2024-04-09 NOTE — TELEPHONE ENCOUNTER
They wont let him get dose if larger wont issue until tomorrow- Needs an alternative medicine to have this work until he can get in-call patient

## 2024-04-09 NOTE — TELEPHONE ENCOUNTER
I can give him an appointment tomorrow but he stated he will be out of pain medication today as he is in a lot of pain. Do you want to see him today or tomorrow?  Leanne Hargrove LPN..................4/9/2024   9:17 AM

## 2024-04-09 NOTE — TELEPHONE ENCOUNTER
Ghanshyam the pharmacist at [facility] stated if the direction were 1-2 tablets for the Oxycodone every 6 hours he could fill it today.  Leanne Hargrove LPN..................4/9/2024   11:51 AM

## 2024-04-09 NOTE — TELEPHONE ENCOUNTER
Tomorrow is ok.  Prescription for pain med is sent.   PDMP Review         Value Time User    State PDMP site checked  Yes 4/9/2024  9:39 AM Maurice Agustin MD Jack V Carlisle, MD on 4/9/2024 at 9:39 AM

## 2024-04-09 NOTE — TELEPHONE ENCOUNTER
Patient called back talked to pharmacy- If Leanne calls him [facility] [phone] Name is Ghanshyam

## 2024-04-09 NOTE — TELEPHONE ENCOUNTER
The patient was notified the Rx was sent in for him.  Leanne Hargrove LPN..................4/9/2024   1:16 PM

## 2024-04-10 NOTE — PROGRESS NOTES
"Nursing Notes:   Leanne Hargrove LPN  4/10/2024 12:20 PM  Sign at exiting of workspace  Chief Complaint   Patient presents with    RECHECK     From the ED       Initial /80   Pulse 81   Temp 98.2  F (36.8  C) (Temporal)   Resp 16   Wt 81 kg (178 lb 9.6 oz)   SpO2 93%   BMI 27.97 kg/m   Estimated body mass index is 27.97 kg/m  as calculated from the following:    Height as of 4/8/24: 1.702 m (5' 7\").    Weight as of this encounter: 81 kg (178 lb 9.6 oz).  Medication Review: complete    The next two questions are to help us understand your food security.  If you are feeling you need any assistance in this area, we have resources available to support you today.          1/31/2024   SDOH- Food Insecurity   Within the past 12 months, did you worry that your food would run out before you got money to buy more? N   Within the past 12 months, did the food you bought just not last and you didn t have money to get more? N         Health Care Directive:  Patient does not have a Health Care Directive or Living Will: Discussed advance care planning with patient; however, patient declined at this time.    Leanne Hargrove LPN      SUBJECTIVE:  Heriberto Noriega  is a 78 year old male who comes in for follow-up from the emergency room.  We saw him a few months ago and he had minimally elevated LFTs and we have been following it.  He had thought because he had gained some weight perhaps he had some fatty liver disease again.  We were continuing to follow.  He went to the ER and was found to have a liver mass.  His LFTs are little higher.  He needs a tissue diagnosis and biopsy has not yet been arranged.    His history in the past of prostate cancer that has been in remission or controlled for quite some time.  He does have some cholelithiasis without cholecystitis.  Some hepatic parenchymal changes were noted that could be scarring.  On CT there is a large poorly defined hepatic mass involving the central right lobe " of the liver and a smaller suspicious lesion in the left lobe without evidence of any metastatic disease elsewhere.  His right portal vein has a filling defect.    3 weeks ago he had abdominal pain radiating to his right shoulder.  He thought he strained something lifting a [place] oven and it gradually improved.  As of Saturday, he has pain so he can't get comfortable. It hurts to breathe. It is better with percocet.  Hurts to lie down and change positions.  Can keep his pain is about a 4 out of 10 with the pain medications.    Past Medical, Family, and Social History reviewed and updated as noted below.   ROS is negative except as noted above       Allergies   Allergen Reactions    Aspirin Hives   ,   Family History   Problem Relation Age of Onset    Heart Disease Father          from CHF    Breast Cancer Mother          from Breast Cancer    Other - See Comments Brother         Agent orange exposure    Breast Cancer Sister     Heart Disease Brother         Heart Disease,cardiac surgery, CABG    Other - See Comments Brother          polio    Cancer Brother         Cancer, from lung cancer   ,   Current Outpatient Medications   Medication Sig Dispense Refill    alfuzosin ER (UROXATRAL) 10 MG 24 hr tablet TAKE ONE TABLET BY MOUTH DAILY WITH FOOD 90 tablet 4    co-enzyme Q-10 50 MG CAPS Take 100 mg by mouth daily       meclizine (ANTIVERT) 12.5 MG tablet Take 1 tablet (12.5 mg) by mouth 3 times daily as needed for dizziness 30 tablet 11    oxyCODONE (ROXICODONE) 5 MG tablet Take 1-3 tablets (5-15 mg) by mouth every 4 hours as needed for severe pain 30 tablet 0    rosuvastatin (CRESTOR) 20 MG tablet TAKE 1 TABLET BY MOUTH EVERY DAY 90 tablet 4    scopolamine (TRANSDERM) 1 MG/3DAYS 72 hr patch Place 1 patch onto the skin every 72 hours 10 patch 3     No current facility-administered medications for this visit.   ,   Past Medical History:   Diagnosis Date    Cholelithiasis     Coronary artery calcification      Dietary counseling and surveillance     Low carbohydrate diet.    Dorsalgia     12/13/2005,with radicular symptoms    Enlarged prostate without lower urinary tract symptoms (luts)     Benign with mild urinary obstruction symptoms.    Hyperlipidemia     Malignant neoplasm of prostate (H)     11/21/2011,He completed radiation treatment for prostate cancer on August 17 of 2012.  Initially had Radioactive palladium seed implants done followed by external beam radiation    Secondary polycythemia     3/29/2017   ,   Patient Active Problem List    Diagnosis Date Noted    Dilatation of thoracic aorta, 4 cm (6/20) 07/01/2020     Priority: Medium    Coronary artery calcification seen on CT scan, Score 1504 06/07/2018     Priority: Medium    Chest pain 05/24/2018     Priority: Medium    Obesity 02/02/2018     Priority: Medium    Polycythemia 03/29/2017     Priority: Medium    Counseling regarding advanced directives and goals of care 07/10/2015     Priority: Medium     Overview:   Wants only comfort cares if not cognitively intact. Does not want to go to skilled nursing facility.      ACP (advance care planning) 12/10/2013     Priority: Medium    Malignant neoplasm of prostate (H) 11/21/2011     Priority: Medium     Overview:   Completed radiation therapy in August of 2012 at [facility]      Hyperlipidemia 10/04/2011     Priority: Medium    Hearing loss, sensorineural 11/10/2009     Priority: Medium    Osteoarthrosis 11/10/2009     Priority: Medium    Seborrheic keratosis 11/10/2009     Priority: Medium    Tinnitus 11/10/2009     Priority: Medium   ,   Past Surgical History:   Procedure Laterality Date    COLONOSCOPY       Hyperplastic polyp at 20 cm.    COLONOSCOPY      5/16/05,next colonoscopy due in 2015    COLONOSCOPY  06/22/2015    Follow up 10 years 06/22/2025, hyperplastic    PROSTATE SURGERY      Seeds and radiation    and   Social History     Tobacco Use    Smoking status: Former     Current packs/day: 0.00      Types: Cigarettes     Quit date: 1979     Years since quittin.3     Passive exposure: Never    Smokeless tobacco: Never   Substance Use Topics    Alcohol use: Yes     Comment: very rare     OBJECTIVE:  /80   Pulse 81   Temp 98.2  F (36.8  C) (Temporal)   Resp 16   Wt 81 kg (178 lb 9.6 oz)   SpO2 93%   BMI 27.97 kg/m     EXAM:  Alert cooperative, no distress.  Would not push on his abdomen today as we did not feel we needed to stir things up.  His affect is appropriate and he is appropriately concerned.  He is here today with his wife.  He has no scleral icterus or jaundice.  Reviewed CT scan with him and his wife in detail.  ASSESSMENT/Plan :    Heriberto was seen today for recheck.    Diagnoses and all orders for this visit:    Liver mass, right lobe  -     US Biopsy Liver; Future  -     US Biopsy Liver; Future  -     Adult Oncology/Hematology  Referral; Future    Abdominal pain, right upper quadrant  -     US Biopsy Liver; Future  -     US Biopsy Liver; Future  -     Adult Oncology/Hematology  Referral; Future    Acute abdominal pain  -     oxyCODONE (ROXICODONE) 5 MG tablet; Take 1-3 tablets (5-15 mg) by mouth every 4 hours as needed for severe pain  -     US Biopsy Liver; Future      Lengthy discussion with the patient and his wife.  Certainly we need to get his pain under control so we will liberalize the dosing and the amount on the oxycodone.  He will not use aspirin or ibuprofen prior to his liver biopsy and will stay away from Tylenol because of his elevated LFTs.  Order placed for ultrasound-guided biopsy of the liver.  Referral to Dr. Cedillo.  I did not order any further imaging as a not sure if MRI of the liver bed would be an appropriate next step or PET scan.    A total of 45 minutes was spent with the patient, reviewing records, tests, ordering medications, tests or procedures and documenting clinical information in the EHR.     Maurice Agustin MD      Answers  submitted by the patient for this visit:  General Questionnaire (Submitted on 4/10/2024)  Chief Complaint: Chronic problems general questions HPI Form  How many servings of fruits and vegetables do you eat daily?: 2-3  On average, how many sweetened beverages do you drink each day (Examples: soda, juice, sweet tea, etc.  Do NOT count diet or artificially sweetened beverages)?: 0  How many minutes a day do you exercise enough to make your heart beat faster?: 30 to 60  How many days a week do you exercise enough to make your heart beat faster?: 7  How many days per week do you miss taking your medication?: 0  General Concern (Submitted on 4/10/2024)  Chief Complaint: Chronic problems general questions HPI Form  What is the reason for your visit today?: er follow up  When did your symptoms begin?: 1-3 days ago  What are your symptoms?: upper rt quadrant pain

## 2024-04-10 NOTE — Clinical Note
Just a heads up on this kip that I asked if you'd follow.  Unexpected development.  Wanted to keep you in the loop. JVC

## 2024-04-10 NOTE — PROGRESS NOTES
Yani Louis is a 78 year old, presenting for the following health issues:  RECHECK (From the ED)      4/10/2024    12:13 PM   Additional Questions   Roomed by Leanne Hargrove LPN   Accompanied by Wife Jennifer     History of Present Illness       Reason for visit:  Er follow up  Symptom onset:  1-3 days ago  Symptoms include:  Upper rt quadrant pain    He eats 2-3 servings of fruits and vegetables daily.He consumes 0 sweetened beverage(s) daily.He exercises with enough effort to increase his heart rate 30 to 60 minutes per day.  He exercises with enough effort to increase his heart rate 7 days per week.   He is taking medications regularly.                     Objective    /80   Pulse 81   Temp 98.2  F (36.8  C) (Temporal)   Resp 16   Wt 81 kg (178 lb 9.6 oz)   SpO2 93%   BMI 27.97 kg/m    Body mass index is 27.97 kg/m .  Physical Exam               Signed Electronically by: Maurice Agustin MD

## 2024-04-10 NOTE — NURSING NOTE
"Chief Complaint   Patient presents with    RECHECK     From the ED       Initial /80   Pulse 81   Temp 98.2  F (36.8  C) (Temporal)   Resp 16   Wt 81 kg (178 lb 9.6 oz)   SpO2 93%   BMI 27.97 kg/m   Estimated body mass index is 27.97 kg/m  as calculated from the following:    Height as of 4/8/24: 1.702 m (5' 7\").    Weight as of this encounter: 81 kg (178 lb 9.6 oz).  Medication Review: complete    The next two questions are to help us understand your food security.  If you are feeling you need any assistance in this area, we have resources available to support you today.          1/31/2024   SDOH- Food Insecurity   Within the past 12 months, did you worry that your food would run out before you got money to buy more? N   Within the past 12 months, did the food you bought just not last and you didn t have money to get more? N         Health Care Directive:  Patient does not have a Health Care Directive or Living Will: Discussed advance care planning with patient; however, patient declined at this time.    Leanne Hargrove LPN      "

## 2024-04-15 NOTE — PROGRESS NOTES
Spoke with the patient by phone and clarified that everything was in place.  He was very appreciative of the care that he is received in the scheduling and all the efforts and kindness that people have shown to him.  We renewed his medications for him.  He is finding that one 5 mg tablet every 4 hours seems to hold him pretty well.  I told him if he needed more pain medications or it was not lasting him, that he should let us know and he will be in touch.  PDMP Review         Value Time User    State PDMP site checked  Yes 4/15/2024  6:21 PM Maurice Agustin MD Jack V Carlisle, MD on 4/15/2024 at 6:22 PM

## 2024-04-15 NOTE — TELEPHONE ENCOUNTER
The patient is still having quite a bit of abdominal pain. Can he get a refill of his pain medication?  Leanne Hargrove LPN..................4/15/2024   3:12 PM

## 2024-04-15 NOTE — TELEPHONE ENCOUNTER
Allergies   Allergen Reactions    Aspirin Hives       Current Outpatient Medications   Medication Sig Dispense Refill    alfuzosin ER (UROXATRAL) 10 MG 24 hr tablet TAKE ONE TABLET BY MOUTH DAILY WITH FOOD 90 tablet 4    co-enzyme Q-10 50 MG CAPS Take 100 mg by mouth daily       meclizine (ANTIVERT) 12.5 MG tablet Take 1 tablet (12.5 mg) by mouth 3 times daily as needed for dizziness 30 tablet 11    oxyCODONE (ROXICODONE) 5 MG tablet Take 1-3 tablets (5-15 mg) by mouth every 4 hours as needed for severe pain 30 tablet 0    rosuvastatin (CRESTOR) 20 MG tablet TAKE 1 TABLET BY MOUTH EVERY DAY 90 tablet 4    scopolamine (TRANSDERM) 1 MG/3DAYS 72 hr patch Place 1 patch onto the skin every 72 hours 10 patch 3     No current facility-administered medications for this visit.     Any anticoagulants or blood thinners? No, None.    Any insulin or oral antihyperglycemic meds? No.     Any injectable medications such as ozempic, victoza, saxenda, wegovy, byetta, bydureon, trulicity, rybelsus, liraglutide, semaglutide, dulaglutide? No.     Are you being treated for an infection (on antibiotics)? No. If yes, discuss with radiologist about when it is appropriate to schedule radiology procedure.    Procedure name: CT liver biopsy    Tash was notified via Teams.    Procedure date verified: Yes, 4/18/24.    Arrival time verified: 1200 PM Please disregard any automatic or other arrival time instructions. This is the accurate time for arrival.  Facility location verified: [address] [place] [address]. Instructed to enter through main clinic entrance, veer to the right, go down long carpeted hallway, and proceed to Diagnostic Registration.  Plan to be here for approximately 3-3.5 hours. Explain current visitor policy to patient.    COVID test: no longer required for outpatient procedures.    Pre-procedure optimization completed within 30 days. Lab results were reviewed by this writer and are WNL or any abnormal labs  discussed with radiologist. CBC RESULTS:     Recent Labs   Lab Test 04/08/24  0916   WBC 8.8   RBC 6.78*   HGB 19.4*   HCT 59.7*   MCV 88   MCH 28.6   MCHC 32.5   RDW 18.2*        Patient will have INR drawn on the day of procedure.     Clear liquids only are ok up until time of procedure, light breakfast is ok.    Transportation after procedure: Yes: will arrange a ride after procedure. Someone must stay with the patient for 24 hours.     Any questions or patient concerns? Yes: all questions answered.     Provide patient with DI nurse number [phone].

## 2024-04-16 NOTE — TELEPHONE ENCOUNTER
After the patient's name and date of birth was verified, the patient was told the below information.  Leanne Hargrove LPN..................4/16/2024   1:42 PM

## 2024-04-17 NOTE — PROGRESS NOTES
Oncology/Hematology Care Coordination - Referral Review    Referred by:  Maurice Agustin MD      Diagnosis:  Liver mass    Most recent Imaging:  CTCAP 4/8, US abdomen 4/18, PET scan scheduled 4/24     Most recent Lab:  4/8    Surgery/Biopsy:  Ct liver bx 4/18    Pathology:      Outside Records:      Scheduled 4/30/24 with Martha Cowart, RN on 4/17/2024 at 8:26 AM

## 2024-04-18 NOTE — DISCHARGE INSTRUCTIONS
PERCUTANEOUS LIVER BIOPSY    A needle biopsy of the liver is a procedure to take a tiny sample (biopsy) of your liver tissue. The tissue sample is looked at under a microscope. Your doctor can look for infection or other liver problems.    MEDICATIONS    Take your medications as directed by your doctor. If you have questions about your medications, please contact your doctor's office directly. If you are on a blood thinner, please follow these directions after your procedure:  _____________________________________________________    DIET    Resume your usual diet.     ACTIVITY    You will need to take it easy at home for 1 or 2 days after the procedure. Lifting heavy items, pulling or pushing objects can increase your risk of bleeding. Rest when you feel tired. Try to walk each day. Start by walking a little more than you did the day before. Bit by bit, increase the amount you walk. Walking boosts blood flow and helps prevent pneumonia and constipation. Avoid exercises that use your belly muscles and strenuous activities, such as bicycle riding, jogging, weight lifting, or aerobic exercise, for 1 week or until your doctor says it is okay. You will probably need to take 1 or 2 days off from work. It depends on the type of work you do and how you feel.    COMFORT    You may have some pain where the biopsy needle entered your skin (the puncture site). You may also have pain in your shoulder. This is called referred pain. It is caused by pain traveling along a nerve near the biopsy site. The referred pain usually lasts less than 12 hours.    CARE OF SITE    Keep the bandage on your biopsy site until this time tomorrow. You can shower. Do not submerge in a tub, pool, hot tub, or lake for 1 week after the procedure. Do not scrub the biopsy site, instead, pat it dry and cover with a new band-aid until the site has healed. Keep the area clean.     WHAT TO WATCH FOR    Anytime a procedure is done, there is a risk of  infection. Call your doctor if you experience any of the following:     Fever over 100.5 degrees, chills, body aches.  Increased redness, red streaks leading from the needle site, swelling, foul smelling drainage.  New or worse shortness of breath.  Bright red blood has soaked through the bandage over the puncture site.  You have pain that does not get better after you take your pain medicine.  You are sick to your stomach or cannot keep fluids down.  You have new or worse belly swelling or bloating.  You have trouble passing urine or stool.  Your stools are black and tarlike or have streaks of blood.  You have pale-colored stools along with dark urine and itching.    WHEN TO RETURN    Call 911 or report to an emergency room if you experience any of the following:    You passed out (lost consciousness).  You have severe trouble breathing.  You have sudden chest pain and shortness of breath, or you cough up blood.  You have sudden, severe pain in your chest, shoulder, or belly.    BIOPSIES    If you had a biopsy, results take between 3-10 business days. Some tests take longer to result than others. Keep in mind that you may see your results before your doctor has viewed the results. Your doctor will contact you with results and to discuss next steps. If you don't hear from your doctor within 48 hours of results, you may call your doctor.     QUESTIONS, PROBLEMS, OR CONCERNS    Please contact your doctor directly or leave a message for a doctor at [facility] by calling [phone].    [facility] Imaging Nurse [phone]  Monday - Friday 8:00 am - 4:30 pm

## 2024-04-18 NOTE — PROGRESS NOTES
Allergies   Allergen Reactions    Aspirin Hives       Current Outpatient Medications   Medication Sig Dispense Refill    oxyCODONE (ROXICODONE) 5 MG tablet Take 1-3 tablets (5-15 mg) by mouth every 4 hours as needed for severe pain 30 tablet 0    acetaminophen (TYLENOL) 500 MG tablet 500 mg      alfuzosin ER (UROXATRAL) 10 MG 24 hr tablet TAKE ONE TABLET BY MOUTH DAILY WITH FOOD 90 tablet 4    co-enzyme Q-10 50 MG CAPS Take 100 mg by mouth daily       meclizine (ANTIVERT) 12.5 MG tablet Take 1 tablet (12.5 mg) by mouth 3 times daily as needed for dizziness 30 tablet 11    oxyCODONE (ROXICODONE) 5 MG tablet Take 1-3 tablets (5-15 mg) by mouth every 4 hours as needed for pain 30 tablet 0    rosuvastatin (CRESTOR) 20 MG tablet TAKE 1 TABLET BY MOUTH EVERY DAY 90 tablet 4    scopolamine (TRANSDERM) 1 MG/3DAYS 72 hr patch Place 1 patch onto the skin every 72 hours 10 patch 3     Current Facility-Administered Medications   Medication Dose Route Frequency Provider Last Rate Last Admin    sodium chloride 0.9 % infusion   Intravenous Continuous Abdoul Roblero MD   Stopped at 04/18/24 1555       Admission on 04/08/2024, Discharged on 04/08/2024   Component Date Value Ref Range Status    Sodium 04/08/2024 136  135 - 145 mmol/L Final    Reference intervals for this test were updated on 09/26/2023 to more accurately reflect our healthy population. There may be differences in the flagging of prior results with similar values performed with this method. Interpretation of those prior results can be made in the context of the updated reference intervals.     Potassium 04/08/2024 3.9  3.4 - 5.3 mmol/L Final    Carbon Dioxide (CO2) 04/08/2024 27  22 - 29 mmol/L Final    Anion Gap 04/08/2024 10  7 - 15 mmol/L Final    Urea Nitrogen 04/08/2024 12.7  8.0 - 23.0 mg/dL Final    Creatinine 04/08/2024 0.71  0.67 - 1.17 mg/dL Final    GFR Estimate 04/08/2024 >90  >60 mL/min/1.73m2 Final    Calcium 04/08/2024 9.8  8.8 - 10.2 mg/dL  Final    Chloride 04/08/2024 99  98 - 107 mmol/L Final    Glucose 04/08/2024 103 (H)  70 - 99 mg/dL Final    Alkaline Phosphatase 04/08/2024 309 (H)  40 - 150 U/L Final    Reference intervals for this test were updated on 11/14/2023 to more accurately reflect our healthy population. There may be differences in the flagging of prior results with similar values performed with this method. Interpretation of those prior results can be made in the context of the updated reference intervals.    AST 04/08/2024 125 (H)  0 - 45 U/L Final    Reference intervals for this test were updated on 6/12/2023 to more accurately reflect our healthy population. There may be differences in the flagging of prior results with similar values performed with this method. Interpretation of those prior results can be made in the context of the updated reference intervals.    ALT 04/08/2024 62  0 - 70 U/L Final    Reference intervals for this test were updated on 6/12/2023 to more accurately reflect our healthy population. There may be differences in the flagging of prior results with similar values performed with this method. Interpretation of those prior results can be made in the context of the updated reference intervals.      Protein Total 04/08/2024 7.2  6.4 - 8.3 g/dL Final    Albumin 04/08/2024 3.9  3.5 - 5.2 g/dL Final    Bilirubin Total 04/08/2024 1.5 (H)  <=1.2 mg/dL Final    Lipase 04/08/2024 40  13 - 60 U/L Final    WBC Count 04/08/2024 8.8  4.0 - 11.0 10e3/uL Final    RBC Count 04/08/2024 6.78 (H)  4.40 - 5.90 10e6/uL Final    Hemoglobin 04/08/2024 19.4 (H)  13.3 - 17.7 g/dL Final    Hematocrit 04/08/2024 59.7 (H)  40.0 - 53.0 % Final    MCV 04/08/2024 88  78 - 100 fL Final    MCH 04/08/2024 28.6  26.5 - 33.0 pg Final    MCHC 04/08/2024 32.5  31.5 - 36.5 g/dL Final    RDW 04/08/2024 18.2 (H)  10.0 - 15.0 % Final    Platelet Count 04/08/2024 191  150 - 450 10e3/uL Final    % Neutrophils 04/08/2024 79  % Final    % Lymphocytes  04/08/2024 11  % Final    % Monocytes 04/08/2024 8  % Final    % Eosinophils 04/08/2024 1  % Final    % Basophils 04/08/2024 1  % Final    % Immature Granulocytes 04/08/2024 1  % Final    NRBCs per 100 WBC 04/08/2024 0  <1 /100 Final    Absolute Neutrophils 04/08/2024 6.9  1.6 - 8.3 10e3/uL Final    Absolute Lymphocytes 04/08/2024 1.0  0.8 - 5.3 10e3/uL Final    Absolute Monocytes 04/08/2024 0.7  0.0 - 1.3 10e3/uL Final    Absolute Eosinophils 04/08/2024 0.1  0.0 - 0.7 10e3/uL Final    Absolute Basophils 04/08/2024 0.1  0.0 - 0.2 10e3/uL Final    Absolute Immature Granulocytes 04/08/2024 0.1  <=0.4 10e3/uL Final    Absolute NRBCs 04/08/2024 0.0  10e3/uL Final    Hold Specimen 04/08/2024 [facility]   Final    Hold Specimen 04/08/2024 [facility]   Final    Color Urine 04/08/2024 Yellow  Colorless, Straw, Light Yellow, Yellow Final    Appearance Urine 04/08/2024 Clear  Clear Final    Glucose Urine 04/08/2024 Negative  Negative mg/dL Final    Bilirubin Urine 04/08/2024 Negative  Negative Final    Ketones Urine 04/08/2024 Negative  Negative mg/dL Final    Specific Gravity Urine 04/08/2024 1.017  1.000 - 1.030 Final    Blood Urine 04/08/2024 Negative  Negative Final    pH Urine 04/08/2024 5.5  5.0 - 9.0 Final    Protein Albumin Urine 04/08/2024 Negative  Negative mg/dL Final    Urobilinogen Urine 04/08/2024 Normal  Normal, 2.0 mg/dL Final    Nitrite Urine 04/08/2024 Negative  Negative Final    Leukocyte Esterase Urine 04/08/2024 Negative  Negative Final    Mucus Urine 04/08/2024 Present (A)  None Seen /LPF Final    RBC Urine 04/08/2024 1  <=2 /HPF Final    WBC Urine 04/08/2024 4  <=5 /HPF Final       Patient accompanied to CT exam room via bedcart. Assisted in positioning patient on CT exam table, stating position is comfortable. Prior to the start of the procedure and with procedural staff participation, I verbally confirmed the patient s identity using two indicators, relevant allergies, that the procedure was appropriate and matched  the consent or emergent situation, and that the correct equipment/implants were available. Immediately prior to starting the procedure I conducted the Time Out with the procedural staff and re-confirmed the patient s name, procedure, and site/side. (The Joint Commission universal protocol was followed.)  Yes.  Pre-Procedure Assessment done at time of Time Out.    Vital signs taken every 5 minutes intraprocedure, then EVERY 15 minutes x 4, then Every 30 minutes x 2 then per unit routine or until discharge criteria is met.    Patient tolerance: Patient tolerated the procedure well with no immediate complications.    Pain medication given: 5mg Oxycodone PO prior to procedure    Procedural site:  Sterile 2x2 and tegaderm placed over procedural site at right abdomen     Liver biopsy to lie on right side for 1 hour, then observe for 1 more hour or per radiologist recommendation.     Following observation in [facility], patient was deemed stable for discharge, cleared by radiologist, and has met discharge criteria. Discharge instructions reviewed with patient. All questions answered. Patient verbalizes understanding of all discharge instructions including when to call a doctor and when to go to ER. Patient discharged to private vehicle with .    Karen Aguayo RN on 4/18/2024 at 4:13 PM

## 2024-04-19 NOTE — TELEPHONE ENCOUNTER
Dr. Pugh called with patient pathology results: Liver biopsy on 4/18/24 shows hepatocellular carcinoma with moderately differentiated right lobe mass. Provider states he will release a full pathology report soon.   Felisha Rudd RN on 4/19/2024 at 4:16 PM

## 2024-04-22 NOTE — TELEPHONE ENCOUNTER
The patient wanted to follow up after seeing Dr Cedillo. He was given an appointment on May 1.  Leanne Hargrove LPN..................4/22/2024   3:15 PM

## 2024-04-22 NOTE — TELEPHONE ENCOUNTER
Patient is requesting to speak with Dr. Manzano' nurse regarding scheduling and if he needs to be seen or not      Fuentes Hernandez on 4/22/2024 at 2:08 PM

## 2024-04-23 NOTE — TELEPHONE ENCOUNTER
He took his last Oxycodone last night. He is concerned about running out of them as he goes through the #30 of them in 5 days. He is wondering about getting more of them as he states he needs to take them. He is picking up the #30 today and he does have an appointment with you on May 1.  Leanne Hargrove LPN..................4/23/2024   8:35 AM

## 2024-04-23 NOTE — TELEPHONE ENCOUNTER
PDMP Review         Value Time User    State PDMP site checked  Yes 4/23/2024  4:48 PM Maurice Agustin MD           2 new prescriptions sent . One to fill 4/29/24 and one to fill 5/3/24  Maurice Agustin MD on 4/23/2024 at 4:51 PM

## 2024-04-23 NOTE — TELEPHONE ENCOUNTER
Patient called back to say there is no need to call as the prescription is being filled.    Mirta Arzate on 4/23/2024 at 11:02 AM

## 2024-04-23 NOTE — TELEPHONE ENCOUNTER
Please call the patient. He is at [facility] now and the RX - Oxi  is not there for him to .          Sandra Green on 4/23/2024 at 10:43 AM

## 2024-04-24 NOTE — TELEPHONE ENCOUNTER
After the patient's name and date of birth was verified, the patient was told the below information.  Leanne Hargrove LPN..................4/24/2024   8:47 AM

## 2024-04-24 NOTE — TELEPHONE ENCOUNTER
Prescription sent for #120 to be filled 4/27/24.   PDMP Review         Value Time User    State PDMP site checked  Yes 4/24/2024  5:28 PM Maurice Agustin MD Jack V Carlisle, MD on 4/24/2024 at 5:28 PM

## 2024-04-24 NOTE — TELEPHONE ENCOUNTER
The patient stated he would like to see if he can get a bigger quantity of his Oxycodone so he does not have to drive to town every 5 days to pick it up. He will need to refill it on 04/28/24 at the latest as he refilled his last Rx on 04/23/2023 and he had run out of them so if he could refill them on 04/27/2024 that would be great.  Leanne Hargrove LPN..................4/24/2024   1:26 PM

## 2024-04-24 NOTE — TELEPHONE ENCOUNTER
After the patient's name and date of birth was verified, the patient was told the below information.  Leanne Hargrove LPN..................4/24/2024   5:33 PM

## 2024-04-30 PROBLEM — C79.51: Status: ACTIVE | Noted: 2024-01-01

## 2024-04-30 PROBLEM — C22.0: Status: ACTIVE | Noted: 2024-01-01

## 2024-04-30 NOTE — PATIENT INSTRUCTIONS
Schedule for Chemo Ed    Referral sent for urgent EGD    F/U with NP cycle 1 day 1    F/U with Dr Cedillo in infusion with cycle 2

## 2024-04-30 NOTE — TELEPHONE ENCOUNTER
[facility] Diagnostic Referral    Patient has a referral for an EGD with a diagnosis of Liver mass, right lobe  Abdominal pain, right upper quadrant  Hepatocellular carcinoma.    It is marked as urgent as patient cannot begin his cancer treatment until his EGD is cleared.    Please advise.    Thank you,  Vira Stephenson on 4/30/2024 at 2:52 PM

## 2024-04-30 NOTE — PROGRESS NOTES
[facility] Consult Note    Patient: Heriberto Noriega  MRN: [id number]  Date of Service: Apr 30, 2024       Reason for Visit    I was consulted by Dr. Agustin      ECOG Performance Status  2    Encounter Diagnoses: Hepatocellular carcinoma metastatic to bone    Problem List Items Addressed This Visit          Oncology Diagnoses    Seborrheic keratosis    Hepatocellular carcinoma metastatic to bone (H) - Primary    Relevant Medications    morphine (MS CONTIN) 30 MG CR tablet    Other Relevant Orders    Infusion Appointment Request     Other Visit Diagnoses       Liver mass, right lobe        Relevant Orders    CBC with Platelets & Differential (Completed)    Comprehensive metabolic panel (Completed)    Lactate Dehydrogenase (Completed)    AFP tumor marker    Hepatitis B Surface Antibody    Hepatitis B core antibody    Hepatitis B surface antigen    Hepatitis C antibody    Erythrocyte sedimentation rate auto (Completed)    Rheumatoid factor    Anti Nuclear Lindsay IgG by IFA with Reflex    Adult GI  Referral - Procedure Only    PET Oncology (Eyes to Thighs)    Abdominal pain, right upper quadrant        Relevant Orders    Erythrocyte sedimentation rate auto (Completed)    Rheumatoid factor    Adult GI  Referral - Procedure Only    Hepatocellular carcinoma (H)        Relevant Orders    CBC with Platelets & Differential (Completed)    Comprehensive metabolic panel (Completed)    Lactate Dehydrogenase (Completed)    AFP tumor marker    Hepatitis B Surface Antibody    Hepatitis B core antibody    Hepatitis B surface antigen    Hepatitis C antibody    Erythrocyte sedimentation rate auto (Completed)    Rheumatoid factor    Anti Nuclear Lindsay IgG by IFA with Reflex    Adult GI  Referral - Procedure Only    PET Oncology (Eyes to Thighs)    Encounter for screening for other viral diseases        Relevant Orders    Hepatitis B Surface Antibody    Hepatitis B core antibody     Hepatitis B surface antigen    Cancer associated pain        Relevant Medications    morphine (MS CONTIN) 30 MG CR tablet    Acute bronchitis, unspecified organism        Relevant Medications    azithromycin (ZITHROMAX) 250 MG tablet                ______________________________________________________________________________    Staging History  Cancer Staging   No matching staging information was found for the patient.        History of Present Illness    Mr. Heriberto Noriega is a 78 year old White male with a history of obesity, hyperlipidemia, prostate cancer rest evaluate concerning new diagnosis of metastatic hepatocellular carcinoma with bone metastases.According to the patient he was very active and at enjoyed fairly active outdoor life experience.  He subsequently developed a 3-week progressive right upper quadrant abdominal pain.  It started after he lifted a heavy pan and developed right lower quadrant and right shoulder pain suddenly and it slowly progressed to the right upper quadrant became very severe almost stabbing presents emergency department on April 8 CT scan of the abdomen/pelvis revealed a large heterogeneous enhancing rounded mass in the central right lobe of the liver with margins and now well-defined lesion measuring roughly 7.3 x 7.7 x 8.0 cm there was also an enhancing poorly defined lesion in the left lobe of the liver measuring 2.8 x 3.6 cm in dimension there was evidence of radiation therapy seeds that were present within the prostate.  The patient also been noted to have polycythemia which was noted in the past beginning 5 months ago and his hemoglobin was 18.0 hematocrit 53.5.  Repeat CBC in the emergency department revealed on hemoglobin of 19.4 and hematocrit 59.7.  Patient denies any history of alcohol abuse, he denies Tylenol abuse, there was no history of hepatitis B or C.  He did mention approxi-20 years ago was a question of him having fatty liver and the diagnosis of HOUSE was  considered.  He was former  who served prior to the [place] War.  There is no history of agent orange exposure.He was prescribed oxycodone 5 mg p.o. every 6 hours as needed pain on discharge.  His LFTs were minimally elevated.  A CT-guided biopsy of the liver mass was performed on April 18 at pathology revealed hepatocellular carcinoma.  It was also stage with a PET scan that was performed on April 24 and the findings were that there was a 15 cm hypermetabolic mass in the right lobe of the liver consistent with malignancy i.e. hepatocellular carcinoma there was evidence of bony hypermetabolic metastatic disease involving the posterior elements of T1-T2 with an SUV of 33 there was also evidence of metastatic disease within the medullary space of the proximal right femur.  Patient comes in today for further evaluation he states he is having increasing pain involving the right upper quadrant he also notes pain in the right hip as well as pain in the right mid back.  He has been taking oxycodone around-the-clock on a every 4 hours basis and is unable to sleep throughout the night which resulted in significant fatigue/insomnia.  Denies any jaundice.  He denies any shortness of breath or hemoptysis.  He does note a productive cough which is new.  Denies any history of hematochezia, hematemesis or melena.  He does note that a brother had hepatocellular carcinoma.  Denies any fevers but does note occasional night sweats with recent weight loss of approximately 10 to 15 pounds.  Reviewed imaging with radiology and imaging does reveal subtle signs of nodularity in the liver consistent with early cirrhosis there is also mild splenomegaly consistent with portal hypertension.  There is no evidence of varices on imaging.    Medications:    Current Outpatient Medications   Medication Sig Dispense Refill    alfuzosin ER (UROXATRAL) 10 MG 24 hr tablet TAKE ONE TABLET BY MOUTH DAILY WITH FOOD 90 tablet 4    azithromycin  (ZITHROMAX) 250 MG tablet Take 2 tablets (500 mg) by mouth daily for 1 day, THEN 1 tablet (250 mg) daily for 4 days. 6 tablet 0    co-enzyme Q-10 50 MG CAPS Take 100 mg by mouth daily       morphine (MS CONTIN) 30 MG CR tablet Take 1 tablet (30 mg) by mouth every 12 hours 60 tablet 0    oxyCODONE (ROXICODONE) 5 MG tablet Take 1-3 tablets (5-15 mg) by mouth every 4 hours as needed for pain 30 tablet 0    rosuvastatin (CRESTOR) 20 MG tablet TAKE 1 TABLET BY MOUTH EVERY DAY 90 tablet 4    acetaminophen (TYLENOL) 500 MG tablet 500 mg (Patient not taking: Reported on 4/30/2024)      meclizine (ANTIVERT) 12.5 MG tablet Take 1 tablet (12.5 mg) by mouth 3 times daily as needed for dizziness (Patient not taking: Reported on 4/30/2024) 30 tablet 11    oxyCODONE (ROXICODONE) 5 MG tablet Take 1-3 tablets (5-15 mg) by mouth every 4 hours as needed for severe pain 120 tablet 0    scopolamine (TRANSDERM) 1 MG/3DAYS 72 hr patch Place 1 patch onto the skin every 72 hours (Patient not taking: Reported on 4/30/2024) 10 patch 3     No current facility-administered medications for this visit.           Past History    Past Medical History:   Diagnosis Date    Cholelithiasis     Coronary artery calcification     Dietary counseling and surveillance     Low carbohydrate diet.    Dorsalgia     12/13/2005,with radicular symptoms    Enlarged prostate without lower urinary tract symptoms (luts)     Benign with mild urinary obstruction symptoms.    Hyperlipidemia     Malignant neoplasm of prostate (H)     11/21/2011,He completed radiation treatment for prostate cancer on August 17 of 2012.  Initially had Radioactive palladium seed implants done followed by external beam radiation    Secondary polycythemia     3/29/2017     Past Surgical History:   Procedure Laterality Date    COLONOSCOPY       Hyperplastic polyp at 20 cm.    COLONOSCOPY      5/16/05,next colonoscopy due in 2015    COLONOSCOPY  06/22/2015    Follow up 10 years 06/22/2025,  hyperplastic    PROSTATE SURGERY      Seeds and radiation     Allergies   Allergen Reactions    Aspirin Hives     Family History   Problem Relation Age of Onset    Heart Disease Father          from CHF    Breast Cancer Mother          from Breast Cancer    Other - See Comments Brother         Agent orange exposure    Breast Cancer Sister     Heart Disease Brother         Heart Disease,cardiac surgery, CABG    Other - See Comments Brother          polio    Cancer Brother         Cancer, from lung cancer     Social History     Socioeconomic History    Marital status:      Spouse name: None    Number of children: None    Years of education: None    Highest education level: None   Tobacco Use    Smoking status: Former     Current packs/day: 0.00     Types: Cigarettes     Quit date: 1979     Years since quittin.3     Passive exposure: Never    Smokeless tobacco: Never   Vaping Use    Vaping status: Never Used   Substance and Sexual Activity    Alcohol use: Yes     Comment: very rare    Drug use: No    Sexual activity: Yes     Partners: Female   Social History Narrative    Previously had been on the Atkins diet.  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      Recently had colonoscopy.  The patient has minimal use of caffeine and sugars.      Jennifer-Wife; in good health    Children:  2 adult children in their 30s and 3 grandchildren    Patient is a former smoker, cigarettes quit 30+ yrs ago.     Alcohol Use - yes, 1 drink per week     Social Determinants of Health     Financial Resource Strain: High Risk (2024)    Financial Resource Strain     Within the past 12 months, have you or your family members you live with been unable to get utilities (heat, electricity) when it was really needed?: Yes   Food Insecurity: Low Risk  (2024)    Food Insecurity     Within the past 12 months, did you worry that your food would run out before you got money to buy more?: No     Within the past 12  months, did the food you bought just not last and you didn t have money to get more?: No   Transportation Needs: Low Risk  (1/31/2024)    Transportation Needs     Within the past 12 months, has lack of transportation kept you from medical appointments, getting your medicines, non-medical meetings or appointments, work, or from getting things that you need?: No    Received from [facility], [facility]    Social Connections   Interpersonal Safety: Low Risk  (1/31/2024)    Interpersonal Safety     Do you feel physically and emotionally safe where you currently live?: Yes     Within the past 12 months, have you been hit, slapped, kicked or otherwise physically hurt by someone?: No     Within the past 12 months, have you been humiliated or emotionally abused in other ways by your partner or ex-partner?: No   Housing Stability: Low Risk  (1/31/2024)    Housing Stability     Do you have housing? : Yes     Are you worried about losing your housing?: No             Review of systems.  CNS: There are no headaches, no blurred vision, no change in mental status,   ENT: There is no hearing loss.  There is voice hoarseness  EYES:  No visual complaints.  Respiratory: There is a productive cough no hemoptysis no shortness of breath  Cardiac: There is no chest pain, orthopnea, PND, or ankle edema.  GI: There is no bright red blood per rectum, no hematemesis, no reflux, no diarrhea or constipation  Musculoskeletal: There is significant mid back pain right hip pain as well as right upper quadrant abdominal pain  : There is no urinary frequency, hematuria.  Constitutional: There is no fevers, night sweats, weight loss.  Endocrine: There is significant fatigue  Neuro: There is no tingling or numbness in the hands or feet.  Hematologic: There is no gingival bleeding, epistaxis, or easy bruisability.  Dermatologic: There is no skin rash.  A 14 point review of systems is  otherwise negative.            Physical Exam    /88 (BP Location: Right arm, Patient Position: Sitting, Cuff Size: Adult Regular)   Pulse 105   Temp 97.3  F (36.3  C) (Tympanic)   Wt 80.7 kg (178 lb)   SpO2 95%   BMI 27.88 kg/m        GENERAL: Alert and oriented to time place and person. Seated comfortably. In no distress.    HEAD: Atraumatic and normocephalic.    EYES: REN, EOMI.  No pallor.  No icterus.    Oral cavity: no mucosal lesion or tonsillar enlargement.    NECK: supple. JVP normal.  No thyroid enlargement.    LYMPH NODES: No palpable, cervical, axillary or inguinal lymphadenopathy.    LUNGS: There is rales rhonchi at the right base left lung is clear.      HEART: S1 and S2 are heard. Regular rate and rhythm.  No murmur or gallop or rub heard.    ABDOMEN: Obese there is palpable tenderness over the right upper quadrant nodularity noted on palpation of the liver.    No other mass palpable.  Bowel sounds heard.    EXTREMITIES: There is trace ankle edema.  Back: There is palpable tenderness over the T1-T2 thoracic spine  SKIN: no rash, or bruising or purpura.      Lab Results    Recent Results (from the past 240 hour(s))   Erythrocyte sedimentation rate auto    Collection Time: 04/30/24  2:31 PM   Result Value Ref Range    Erythrocyte Sedimentation Rate 16 0 - 20 mm/hr   Lactate Dehydrogenase    Collection Time: 04/30/24  2:31 PM   Result Value Ref Range    Lactate Dehydrogenase 429 (H) 0 - 250 U/L   Comprehensive metabolic panel    Collection Time: 04/30/24  2:31 PM   Result Value Ref Range    Sodium 134 (L) 135 - 145 mmol/L    Potassium 4.0 3.4 - 5.3 mmol/L    Carbon Dioxide (CO2) 25 22 - 29 mmol/L    Anion Gap 12 7 - 15 mmol/L    Urea Nitrogen 17.3 8.0 - 23.0 mg/dL    Creatinine 0.57 (L) 0.67 - 1.17 mg/dL    GFR Estimate >90 >60 mL/min/1.73m2    Calcium 9.8 8.8 - 10.2 mg/dL    Chloride 97 (L) 98 - 107 mmol/L    Glucose 151 (H) 70 - 99 mg/dL    Alkaline Phosphatase 299 (H) 40 - 150 U/L    AST  147 (H) 0 - 45 U/L    ALT 54 0 - 70 U/L    Protein Total 6.4 6.4 - 8.3 g/dL    Albumin 3.3 (L) 3.5 - 5.2 g/dL    Bilirubin Total 2.9 (H) <=1.2 mg/dL   CBC with platelets and differential    Collection Time: 04/30/24  2:31 PM   Result Value Ref Range    WBC Count 10.5 4.0 - 11.0 10e3/uL    RBC Count 6.96 (H) 4.40 - 5.90 10e6/uL    Hemoglobin 19.3 (H) 13.3 - 17.7 g/dL    Hematocrit 58.4 (H) 40.0 - 53.0 %    MCV 84 78 - 100 fL    MCH 27.7 26.5 - 33.0 pg    MCHC 33.0 31.5 - 36.5 g/dL    RDW 19.6 (H) 10.0 - 15.0 %    Platelet Count 217 150 - 450 10e3/uL    % Neutrophils 81 %    % Lymphocytes 8 %    % Monocytes 10 %    % Eosinophils 0 %    % Basophils 1 %    % Immature Granulocytes 1 %    NRBCs per 100 WBC 0 <1 /100    Absolute Neutrophils 8.5 (H) 1.6 - 8.3 10e3/uL    Absolute Lymphocytes 0.8 0.8 - 5.3 10e3/uL    Absolute Monocytes 1.0 0.0 - 1.3 10e3/uL    Absolute Eosinophils 0.0 0.0 - 0.7 10e3/uL    Absolute Basophils 0.1 0.0 - 0.2 10e3/uL    Absolute Immature Granulocytes 0.1 <=0.4 10e3/uL    Absolute NRBCs 0.0 10e3/uL       Imaging Results    PET Oncology (Eyes to Thighs)    Result Date: 4/25/2024  Procedure: PET ONCOLOGY (EYES TO THIGHS) Subtype: Initial Radiopharmaceutical: F-18 FDG Dose: 11.60 mCi IV Serum Glucose: 70 mg/dl History: Liver mass; Other specified carcinomas of liver (H) Comparison: CT 4/8/2024 Technique:  A low dose CT examination was performed for the purpose of attenuation correction and localization. Attenuation corrected PET images from the skull base to the mid thigh were acquired approximately one hour following intravenous administration of the dose, and displayed in transaxial, sagittal, and coronal projections. Uptake time: 60 minutes. Findings: Background uptake: Blood pool 2.5, liver 3.5. Neck: No sites of suspicious hypermetabolism. Thorax: No sites of suspicious hypermetabolism. Abdomen/Pelvis: Hypermetabolism throughout most of the right lobe of the liver, with SUVs up to 11.2, spanning  12.6 x 13.2 x 14.9 cm. Thighs: No sites of suspicious hypermetabolism. Bony structures: Focal hypermetabolism within the medullary space of the proximal right femur with SUVs up to 9.3. Focal hypermetabolism within the right posterior elements of T1-2 maximum SUV of 33.     IMPRESSION:  15 cm hypermetabolic mass of the right lobe of the liver compatible with the recent tissue diagnosis of cholangiocarcinoma. Bony hypermetabolic metastatic disease within the right posterior elements of T1 and the right proximal femur. KATLIN HERNANDEZ MD   SYSTEM ID:  [id number]    CT Liver Biopsy    Result Date: 4/18/2024  Procedure: CT LIVER BIOPSY HISTORY:  Liver mass, right lobe; Abdominal pain, right upper quadrant; Acute abdominal pain. COMPARISON: CT 4/8/24 TECHNIQUE: The risks and benefits of percutaneous CT guided liver lesion biopsy including infection, bleeding, liver laceration, bile duct fistula and nondiagnostic biopsy were discussed the patient who expressed understanding and willingness to proceed. Informed consent was obtained. The patient was placed left posterior oblique in the CT table. Initial  localizer images without contrast did not demonstrate the hepatic lesion. Repeat images with increased dose demonstrated heterogeneity throughout much of the right lobe. Note is made of a small volume of perihepatic ascites prior to the procedure. The overlying skin was marked, prepped and draped in standard sterile fashion. Lidocaine was used to anesthetize the superficial soft tissues. A 20-gauge coaxial core needle biopsy system was used. The guide needle was then advanced into the lesion and 3 sequential 20-gauge biopsies were obtained into the lesion. Immediate specimen adequacy was assessed by pathology service. Initial passes were diagnostic. Postprocedural imaging demonstrates no significant change in the volume of perihepatic fluid. Subjectively, the patient had no immediate complication.     IMPRESSION:  Successful CT-guided right liver mass biopsy. KATLIN HERNANDEZ MD   SYSTEM ID:  [id number]    CT Chest/Abdomen/Pelvis w Contrast    Result Date: 4/8/2024  CT CHEST/ABDOMEN/PELVIS W CONTRAST HISTORY: 78 yearsMale Right upper quadrant/right-sided chest discomfort, transaminitis, polycythemia, abnormal ultrasound. TECHNIQUE: Axial CT imaging of the chest abdomen and pelvis was performed with contrast. Coronal and sagittal reconstructions were obtained. This exam was performed using one or more of the following dose reduction techniques: Automated exposure control, adjustment of the EZIO and/or KV according to patient's size, and/or use of iterative reconstruction technique. COMPARISON: CT 6/1/2018, ultrasound 4/8/2024 FINDINGS: CT CHEST: There is no mediastinal or hilar or axillary lymphadenopathy. The lungs are clear. No consolidating airspace opacities are present. No concerning pulmonary nodules or masses are present      No concerning osseous lesions are identified IMPRESSION CHEST: Clear chest CT ABDOMEN AND PELVIS: Liver: There is a large suspicious heterogeneously enhancing rounded mass in the central right lobe the liver, roughly segments 5 and 8. The margins are now well-defined but the lesion measures roughly 7.3 x 7.7 x 8.0 cm. There is heterogeneous abnormal enhancement throughout the majority of the right lobe of the liver. This may be secondary to alteration of hepatic parenchymal blood perfusion, more widespread neoplastic involvement or combination of the above. Additionally, there is a subtle heterogeneously enhancing poorly defined lesion in the left lobe liver measuring 2.8 x 3.6 cm in dimension.  There is mass effect upon the intrahepatic portion of the inferior vena cava. There is a filling defect involving the right portal vein, see series 9 images 29 through 32. The main portal vein and left hepatic vein opacify normally. Gallbladder: There there is no biliary dilatation. There is a  circumferentially calcified gallstone. Spleen: Unremarkable Pancreas: Unremarkable Adrenals: Unremarkable Kidneys: Unremarkable Bowel: No abnormally distended or thickened loops of bowel present Lymph nodes: There is no evidence of mass or lymphadenopathy. PELVIS: There is no evidence of mass lymphadenopathy area there is a small volume of free fluid. Radiation therapy seeds are present within the prostate There is an infrarenal abdominal aortic aneurysm measuring 3.8 cm in diameter. IMPRESSION ABDOMEN AND PELVIS: Large poorly defined hepatic mass involving the central right lobe of the liver, concerning for primary hepatic malignancy versus metastasis. In addition there is a smaller suspicious lesion in the left lobe of liver. See description above for further comment. There is otherwise no evidence of metastatic disease within the abdomen or pelvis. There is a small volume of free fluid in the pelvis. There is an infrarenal abdominal aortic aneurysm measuring 3.8 cm. VERONA MARISCAL MD   SYSTEM ID:  [id number]    US Abdomen Complete    Result Date: 4/8/2024  EXAMINATION: US ABDOMEN COMPLETE, 4/8/2024 10:19 AM COMPARISON: CT chest 6/1/2018 HISTORY: Transaminitis, right upper quadrant pain, dark urine TECHNIQUE: The abdomen was scanned in standard fashion with specialized ultrasound transducer(s) using both grey scale and limited color Doppler techniques. FINDINGS: Liver: The liver demonstrates heterogeneously coarsened echotexture. Ill-defined echogenic foci with some surrounding hypoechogenicity, one is located in the left lobe and measures 2.5 x 2.0 cm and the other is located in the right lobe and measures 8.1 x 8.0 cm. Somewhat nodular contour of the liver. Main portal vein is dilated, measures up to 1.4 cm, demonstrates appropriate flow towards the liver. Gallbladder: The gallbladder is well distended and of normal morphology. There is no wall thickening. There is pericholecystic fluid, sonographic Gibson's  sign and evidence for cholelithiasis. Bile Ducts: Both the intra- and extrahepatic biliary system are of normal caliber.  The common bile duct measures 2 mm in diameter. Pancreas: Visualized portions of the head and body of the pancreas are unremarkable. Right kidney:  11.5 cm. No focal soft tissue mass. Cortical medullary differentiation is within normal limits. No hydronephrosis. No shadowing renal calculi. Left kidney:  12.4 cm. No focal soft tissue mass. Cortical medullary differentiation is within normal limits. No hydronephrosis. No shadowing renal calculi. Spleen: The spleen is mildly enlarged, measuring 13.6 cm in maximum dimension. Aorta and IVC:  The visualized portions of the aorta and IVC are unremarkable. Fluid: No evidence of pleural effusions.     IMPRESSION: 1.  Cholelithiasis without definite acute cholecystitis. No gallbladder wall thickening. Trace pericholecystic fluid is favored secondary to underlying liver disease. 2.  There are 2 echogenic foci in the liver, concerning for potential masses. Recommend better characterization with CT and/or MRI. 3.  Hepatic parenchymal changes likely representing underlying liver disease such as cirrhosis. REFUGIO CHOUDHURY MD   SYSTEM ID:  [id number]     Assessment/Plan:    1: Newly diagnosed hepatocellular carcinoma with bony metastases:Child-Kamara class A as per NCCN guidelines as well as multiple studies patient will be a candidate for atezolizumab plus bevacizumab in the phase 3 trial evaluating atezolizumab plus bevacizumab in the treatment of metastatic or unresectable hepatocellular carcinoma: Median overall survival was improved when compared to treatment with sorafenib alone and overall follow-up of 20.6 months 18% of patients are still receiving atezolizumab plus bevacizumab median overall survival is 19.2 months 52% of the patients with living beyond 2 years.  This regimen was considered superior to standard of care and is now considered the best frontline  therapy for metastatic hepatocellular carcinoma.  Given the fact patient may have underlying signs of portal hypertension it was recommended the patient obtain an EGD to rule out esophageal varices.  If he has esophageal varices he would not be a candidate for bevacizumab due to the risk of bleeding if it does not we will proceed with therapy.  The patient is anxious to start immediately as he is having ongoing pain symptoms and his bilirubin has been slowly rising would like to have Dr. Stanton see the patient next week for EGD if it is negative we will proceed with therapy with bevacizumab and atezolizumab atezolizumab was given a dose 1200 mg IV on day 1 and bevacizumab was given at a dose of 15 mg/kg on day 1 of q. 21-day schedule we will proceed with treatment immediately after EGD and likely restage with a PET scan after 4 cycles of this regimen.  In terms of pain management we have elected to switch to MS Contin 30 mg every 12 hours around-the-clock and use oxycodone sparingly and hopefully DC oxycodone.  He has clinical and physical exam signs consistent with possible early right lower lobe pneumonia we will treat empirically with Z-Sharif otherwise the plan is to treat the patient with cycle 2-day 1 we will monitor alpha-fetoprotein tumor marker as well and obtain a baseline alpha-fetoprotein tumor marker ordered chemotherapy education with our nurse practitioner.  Patient is in agreement and wants to start a soon as possible we will make a referral to general surgery i.e. .  The patient had a previous colonoscopy performed with Dr. Stanton.  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  Will obtain erythropoietin level.  No need for phlebotomy as well as his hematocrit rises significantly above 55.  Time spent: 138 minutes was spent on this new patient consultation visit: I spent enormous amount of time reviewing multiple physician  provider notes including emergency physician notes primary care notes reviewed pathology results and reviewed the literature on treatment of unresectable/metastatic hepatocellular carcinoma, we discussed pathology results as well as PET scan results with the patient, performed history/physical, with documented history/physical, we ordered referral for EGD, we ordered immunotherapy for this patient we ordered MS Contin for pain management and ordered follow-up labs and appointments.    Signed by: Martha Cedillo MD

## 2024-04-30 NOTE — NURSING NOTE
"Oncology Rooming Note    April 30, 2024 1:09 PM   Heriberto Noriega is a 78 year old male who presents for:    Chief Complaint   Patient presents with    Oncology Clinic Visit     Consult: Liver mass with pathology 4/18/24     Initial Vitals: /88 (BP Location: Right arm, Patient Position: Sitting, Cuff Size: Adult Regular)   Pulse 105   Temp 97.3  F (36.3  C) (Tympanic)   Wt 80.7 kg (178 lb)   SpO2 95%   BMI 27.88 kg/m   Estimated body mass index is 27.88 kg/m  as calculated from the following:    Height as of 4/8/24: 1.702 m (5' 7\").    Weight as of this encounter: 80.7 kg (178 lb). Body surface area is 1.95 meters squared.  Moderate Pain (5) Comment: Data Unavailable   No LMP for male patient.  Allergies reviewed: Yes  Medications reviewed: Yes    Medications: Medication refills not needed today.  Pharmacy name entered into [employer]: [facility] [id number] [place] [address]    Frailty Screening:   Is the patient here for a new oncology consult visit in cancer care? 1. Yes. Over the past month, have you experienced difficulty or required a caregiver to assist with:   1. Balance, walking or general mobility (including any falls)? NO, but has noticed more light headedness in the past couple of days  2. Completion of self-care tasks such as bathing, dressing, toileting, grooming/hygiene?  No  3. Concentration or memory that affects your daily life?  YES when taking pain pills.      Clinical concerns: Pain, appetite and would like to know the nature of the cancer, treatments and prognosis  Dr Cedillo was notified.    Rosario Marie CMA ([facility]) 4/30/2024 1:09 PM  "

## 2024-05-01 NOTE — TELEPHONE ENCOUNTER
Screening Questions for the Scheduling of Screening Colonoscopies   (If Colonoscopy is diagnostic, Provider should review the chart before scheduling.)  Are you younger than 45 or older than 80?  NO  Do you take aspirin or fish oil?  NO (if yes, tell patient to stop 1 week prior to Colonoscopy)  Do you take warfarin (Coumadin), clopidogrel (Plavix), apixaban (Eliquis), dabigatram (Pradaxa), rivaroxaban (Xarelto) or any blood thinner? NO  Do you take Ozempic? NO  Do you use oxygen or a CPAP at home?  CPAP  Do you have kidney disease? NO  Are you on dialysis? NO  Have you had a stroke or heart attack in the last year? NO  Have you had a stent in your heart or any blood vessel in the last year? NO  Have you had a transplant of any organ? NO  Have you had a colonoscopy or upper endoscopy (EGD) before? NO  Date of scheduled EGD. 05/06/2024  Provider RED[place]  Pharmacy [facility]

## 2024-05-01 NOTE — PROGRESS NOTES
Yani Louis is a 78 year old, presenting for the following health issues:  RECHECK (After seeing Dr Cedillo)      5/1/2024    10:10 AM   Additional Questions   Roomed by Leanne Hargrove LPN   Accompanied by Wife and Daughter     History of Present Illness       Back Pain:  He presents for follow up of back pain. Patient's back pain is a chronic problem.  Location of back pain:  Right upper back  Description of back pain: other  Back pain spreads: nowhere and right foot    Since patient first noticed back pain, pain is: always present, but gets better and worse  Does back pain interfere with his job:  No       Reason for visit:  Consultation    He eats 0-1 servings of fruits and vegetables daily.He consumes 0 sweetened beverage(s) daily.He exercises with enough effort to increase his heart rate 9 or less minutes per day.  He exercises with enough effort to increase his heart rate 3 or less days per week.   He is taking medications regularly.                     Objective    /82   Pulse 105   Temp 97.2  F (36.2  C) (Temporal)   Resp 16   Wt 81.1 kg (178 lb 12.8 oz)   SpO2 93%   BMI 28.00 kg/m    Body mass index is 28 kg/m .  Physical Exam               Signed Electronically by: Maurice Agustin MD

## 2024-05-01 NOTE — PROGRESS NOTES
"Nursing Notes:   Leanne Hargrove LPN  5/1/2024 10:21 AM  Sign at exiting of workspace  Chief Complaint   Patient presents with    RECHECK     After seeing Dr Cedillo       Initial /82   Pulse 105   Temp 97.2  F (36.2  C) (Temporal)   Resp 16   Wt 81.1 kg (178 lb 12.8 oz)   SpO2 93%   BMI 28.00 kg/m   Estimated body mass index is 28 kg/m  as calculated from the following:    Height as of 4/8/24: 1.702 m (5' 7\").    Weight as of this encounter: 81.1 kg (178 lb 12.8 oz).  Medication Review: complete    The next two questions are to help us understand your food security.  If you are feeling you need any assistance in this area, we have resources available to support you today.          1/31/2024   SDOH- Food Insecurity   Within the past 12 months, did you worry that your food would run out before you got money to buy more? N   Within the past 12 months, did the food you bought just not last and you didn t have money to get more? N         Health Care Directive:  Patient does not have a Health Care Directive or Living Will: Discussed advance care planning with patient; however, patient declined at this time.    Leanne Hargrove LPN      SUBJECTIVE:  Heriberto Noriega  is a 78 year old male who comes in today for follow-up.  He was having right upper quadrant pain and turned out that he had a liver mass.  He had biopsy showing hepatocellular carcinoma.  He saw Dr. Cedillo yesterday.  His PET scan showed a 15 cm hypermetabolic mass in the right lobe of the liver and bony hypermetabolic metastatic disease at posterior elements of T1 and the proximal femur.  He apparently is a candidate for atezolizumab (Teccentric)plus bevacizumab (Avastin)and a phase 3 trial.  EGD is recommended to rule out esophageal varices.  Apparently if he has those he would not be a candidate for the bevacizumab because of risk of bleeding.  They felt that he probably had an early right lower lobe pneumonia and he was treated with a " Z-Sharif yesterday.  They got a baseline alpha-fetoprotein tumor marker.  He has a secondary polycythemia likely due to increased erythropoietin levels based on his hepatocellular malignancy but they did not feel he needed phlebotomy.  They also ordered MS Contin for pain management for him as he been taking oxycodone 1 tablet every 4 hours pretty consistently.  He was placed on MS Contin 30 mg every 12 hours.    He has best night sleep of the last several weeks.  Seems to get good relief from that.    He developed blood in the urine.  Had gross hematuria overnight.  He has had some right flank and groin pain.  No dysuria or frequency.  This is new for him.  He had no evidence of upper tract lesion on his CT of the chest abdomen and pelvis with contrast that was done recently.  He has been taking Zithromax since yesterday.    Past Medical, Family, and Social History reviewed and updated as noted below.   ROS is negative except as noted above       Allergies   Allergen Reactions    Aspirin Hives   ,   Family History   Problem Relation Age of Onset    Heart Disease Father          from CHF    Breast Cancer Mother          from Breast Cancer    Other - See Comments Brother         Agent orange exposure    Breast Cancer Sister     Heart Disease Brother         Heart Disease,cardiac surgery, CABG    Other - See Comments Brother          polio    Cancer Brother         Cancer, from lung cancer   ,   Current Outpatient Medications   Medication Sig Dispense Refill    alfuzosin ER (UROXATRAL) 10 MG 24 hr tablet TAKE ONE TABLET BY MOUTH DAILY WITH FOOD 90 tablet 4    azithromycin (ZITHROMAX) 250 MG tablet Take 2 tablets (500 mg) by mouth daily for 1 day, THEN 1 tablet (250 mg) daily for 4 days. 6 tablet 0    ciprofloxacin (CIPRO) 250 MG tablet Take 1 tablet (250 mg) by mouth 2 times daily 20 tablet 0    co-enzyme Q-10 50 MG CAPS Take 100 mg by mouth daily       morphine (MS CONTIN) 30 MG CR tablet Take 1 tablet (30  mg) by mouth every 12 hours 60 tablet 0    rosuvastatin (CRESTOR) 20 MG tablet TAKE 1 TABLET BY MOUTH EVERY DAY 90 tablet 4    acetaminophen (TYLENOL) 500 MG tablet 500 mg (Patient not taking: Reported on 4/30/2024)      meclizine (ANTIVERT) 12.5 MG tablet Take 1 tablet (12.5 mg) by mouth 3 times daily as needed for dizziness (Patient not taking: Reported on 4/30/2024) 30 tablet 11    oxyCODONE (ROXICODONE) 5 MG tablet Take 1-3 tablets (5-15 mg) by mouth every 4 hours as needed for severe pain (Patient not taking: Reported on 5/1/2024) 120 tablet 0    oxyCODONE (ROXICODONE) 5 MG tablet Take 1-3 tablets (5-15 mg) by mouth every 4 hours as needed for pain (Patient not taking: Reported on 5/1/2024) 30 tablet 0    scopolamine (TRANSDERM) 1 MG/3DAYS 72 hr patch Place 1 patch onto the skin every 72 hours (Patient not taking: Reported on 4/30/2024) 10 patch 3     No current facility-administered medications for this visit.   ,   Past Medical History:   Diagnosis Date    Cholelithiasis     Coronary artery calcification     Dietary counseling and surveillance     Low carbohydrate diet.    Dorsalgia     12/13/2005,with radicular symptoms    Enlarged prostate without lower urinary tract symptoms (luts)     Benign with mild urinary obstruction symptoms.    Hyperlipidemia     Malignant neoplasm of prostate (H)     11/21/2011,He completed radiation treatment for prostate cancer on August 17 of 2012.  Initially had Radioactive palladium seed implants done followed by external beam radiation    Secondary polycythemia     3/29/2017   ,   Patient Active Problem List    Diagnosis Date Noted    Hepatocellular carcinoma metastatic to bone (H) 04/30/2024     Priority: Medium    Dilatation of thoracic aorta, 4 cm (6/20) 07/01/2020     Priority: Medium    Coronary artery calcification seen on CT scan, Score 1504 06/07/2018     Priority: Medium    Chest pain 05/24/2018     Priority: Medium    Obesity 02/02/2018     Priority: Medium     Polycythemia 2017     Priority: Medium    Counseling regarding advanced directives and goals of care 07/10/2015     Priority: Medium     Overview:   Wants only comfort cares if not cognitively intact. Does not want to go to skilled nursing facility.      ACP (advance care planning) 12/10/2013     Priority: Medium    Malignant neoplasm of prostate (H) 2011     Priority: Medium     Overview:   Completed radiation therapy in 2012 at [facility] 10/04/2011     Priority: Medium    Hearing loss, sensorineural 11/10/2009     Priority: Medium    Osteoarthrosis 11/10/2009     Priority: Medium    Seborrheic keratosis 11/10/2009     Priority: Medium    Tinnitus 11/10/2009     Priority: Medium   ,   Past Surgical History:   Procedure Laterality Date    COLONOSCOPY       Hyperplastic polyp at 20 cm.    COLONOSCOPY      05,next colonoscopy due in     COLONOSCOPY  2015    Follow up 10 years 2025, hyperplastic    PROSTATE SURGERY      Seeds and radiation    and   Social History     Tobacco Use    Smoking status: Former     Current packs/day: 0.00     Types: Cigarettes     Quit date: 1979     Years since quittin.3     Passive exposure: Never    Smokeless tobacco: Never   Substance Use Topics    Alcohol use: Yes     Comment: very rare     OBJECTIVE:  /82   Pulse 105   Temp 97.2  F (36.2  C) (Temporal)   Resp 16   Wt 81.1 kg (178 lb 12.8 oz)   SpO2 93%   BMI 28.00 kg/m     EXAM:  Alert and cooperative, no distress.  He is here today with his wife and his daughter.  Gets up on the exam table without assistance.  He has no CVA tenderness.  Some right flank tenderness is present that may radiate to the groin.  Tenderness in the right upper quadrant persists.  Lungs are clear, no rales rhonchi or wheezes are heard.  Cardiac RRR without murmur.    Results for orders placed or performed during the hospital encounter of 24   CT Urogram wo & w  Contrast     Status: None    Narrative    PROCEDURE:  CT UROGRAM WO & W CONTRAST    HISTORY: Gross hematuria; Right flank pain    TECHNIQUE: Helical CT of the abdomen and pelvis was performed before  and with a partially delayed split (urogram) injection of intravenous  contrast. This CT exam was performed using one or more the following  dose reduction techniques: automated exposure control, adjustment of  the mA and/or kV according to patient size, and/or iterative  reconstruction technique.    COMPARISON: 4/8/2024, 4/24/2024.    MEDS/CONTRAST: 103 ml isovue 370    FINDINGS:      Limited images through the lung bases demonstrate no focal  consolidation or mass.     Abnormal enhancement of the majority of the liver persists. Portal  vein thrombosis is significantly worse, extending into the SMV and  splenic vein.    Splenomegaly is unchanged. A dense gallstone is seen. The pancreas and  adrenal glands are unchanged. The bowel is normal in caliber. No free  air is seen. Infrarenal aorta measures up to 4.2 cm.    No renal or ureteral calculus is identified. Symmetric nephrograms are  present without hydronephrosis. The bladder is mildly distended.  Prostate seeds are noted.    No suspicious osseous lesions are identified.      Impression    IMPRESSION:      Extensive hepatic carcinoma associated with significant interval  progression of portal vein thrombus, now extending into the SMV and  splenic vein.    New mild-to-moderate ascites.    No hydronephrosis. No collecting system calculus. Prior prostate  seeds.    KATLIN HERNANDEZ MD         SYSTEM ID:  [id number]   Results for orders placed or performed in visit on 05/01/24   UA with Microscopic reflex to Culture     Status: Abnormal    Specimen: Urine, Midstream   Result Value Ref Range    Color Urine Orange (A) Colorless, Straw, Light Yellow, Yellow    Appearance Urine Cloudy (A) Clear    Glucose Urine Negative Negative mg/dL    Bilirubin Urine Negative Negative     Ketones Urine Trace (A) Negative mg/dL    Specific Gravity Urine 1.023 1.000 - 1.030    Blood Urine Large (A) Negative    pH Urine 5.5 5.0 - 9.0    Protein Albumin Urine 50 (A) Negative mg/dL    Urobilinogen Urine 2.0 Normal, 2.0 mg/dL    Nitrite Urine Negative Negative    Leukocyte Esterase Urine Moderate (A) Negative    WBC Clumps Urine Present (A) None Seen /HPF    Mucus Urine Present (A) None Seen /LPF    RBC Urine >182 (H) <=2 /HPF    WBC Urine 177 (H) <=5 /HPF    Hyaline Casts Urine 13 (H) <=2 /LPF    Narrative    Urine Culture ordered based on laboratory criteria      ASSESSMENT/Plan :    Heriberto was seen today for recheck.    Diagnoses and all orders for this visit:    Hepatocellular carcinoma metastatic to bone (H)    Gross hematuria  -     UA with Microscopic reflex to Culture  -     Urine Culture  -     CT Abdomen Pelvis w/o Contrast; Future  -     ciprofloxacin (CIPRO) 250 MG tablet; Take 1 tablet (250 mg) by mouth 2 times daily  -     Cancel: CT Abdomen Pelvis w/o Contrast; Future  -     CT Urogram wo & w Contrast; Future    Right flank pain  -     CT Abdomen Pelvis w/o Contrast; Future  -     ciprofloxacin (CIPRO) 250 MG tablet; Take 1 tablet (250 mg) by mouth 2 times daily  -     Cancel: CT Abdomen Pelvis w/o Contrast; Future  -     CT Urogram wo & w Contrast; Future    Cancer associated pain  -     morphine (MS CONTIN) 30 MG CR tablet; Take 1 tablet (30 mg) by mouth every 12 hours      He has pyuria and hematuria.  No evidence of upper tract lesion or stone.  May be infection.  Will continue with Zithromax and will add Cipro 250 twice daily.  Await results of culture.  With a negative CT, unless he obviously has an infection, would then need cystoscopy to evaluate for bladder tumors.    He has upper endoscopy scheduled for Monday to rule out varices as he could not have one of the agents if he was more prone to bleeding.  Discussed the possibility that he might be able to get these banded if he did  have varices but I would be surprised if he actually had them present.    I sent a message to Dr. Bill to try and see if we can get him in for cystoscopy if needed sooner rather than later.    A total of 45 minutes was spent with the patient, reviewing records, tests, ordering medications, tests or procedures and documenting clinical information in the EHR.     Maurice Agustin MD          Answers submitted by the patient for this visit:  Patient Health Questionnaire (Submitted on 5/1/2024)  If you checked off any problems, how difficult have these problems made it for you to do your work, take care of things at home, or get along with other people?: Very difficult  PHQ9 TOTAL SCORE: 21  Back Pain Visit Questionnaire (Submitted on 5/1/2024)  Your back pain is: chronic  Chronic or Recurring Back Pain Visit Questionnaire (Submitted on 5/1/2024)  Where is your back pain located? : right upper back  How would you describe your back pain? : other  Where does your back pain spread? : nowhere, right foot  Since you noticed your back pain, how has it changed? : always present, but gets better and worse  Does your back pain interfere with your job?: No  General Questionnaire (Submitted on 5/1/2024)  Chief Complaint: Chronic problems general questions HPI Form  What is the reason for your visit today? : consultation  How many servings of fruits and vegetables do you eat daily?: 0-1  On average, how many sweetened beverages do you drink each day (Examples: soda, juice, sweet tea, etc.  Do NOT count diet or artificially sweetened beverages)?: 0  How many minutes a day do you exercise enough to make your heart beat faster?: 9 or less  How many days a week do you exercise enough to make your heart beat faster?: 3 or less  How many days per week do you miss taking your medication?: 0

## 2024-05-02 NOTE — TELEPHONE ENCOUNTER
Per Martha Cedillo MD patient needs to start Eliquis 2.5 mg twice daily.  Maribel Cowart RN...........5/2/2024 10:43 AM

## 2024-05-02 NOTE — PROGRESS NOTES
Patient was seen by Dr. Agustin and put on antibiotic yesterday for UTI. Per NP patient states he is already feeling better. They were also referred to urology and are wondering where this referral went.  Message sent to urology nurse to see if they were dealing with this referral.  Maribel Cowart RN...........5/2/2024 10:11 AM

## 2024-05-02 NOTE — NURSING NOTE
"Oncology Rooming Note    May 2, 2024 9:08 AM   Heriberto Noriega is a 78 year old male who presents for:    Chief Complaint   Patient presents with    Chemo Education     Initial Vitals: /68 (BP Location: Right arm, Patient Position: Sitting, Cuff Size: Adult Regular)   Pulse 100   Temp 96.8  F (36  C) (Tympanic)   Ht 1.702 m (5' 7\")   Wt 80.7 kg (178 lb)   SpO2 93%   BMI 27.88 kg/m   Estimated body mass index is 27.88 kg/m  as calculated from the following:    Height as of this encounter: 1.702 m (5' 7\").    Weight as of this encounter: 80.7 kg (178 lb). Body surface area is 1.95 meters squared.  No Pain (1) Comment: much better on new pain meds   No LMP for male patient.  Allergies reviewed: Yes  Medications reviewed: Yes    Medications: Medication refills not needed today.  Pharmacy name entered into [employer]: [facility] [id number] [place] [address]    Frailty Screening:   Is the patient here for a new oncology consult visit in cancer care? 2. No      Clinical concerns: urology referral placed yesterday by [facility] for the CT Urogram result.      Rosario Marie CMA ([facility]) 5/2/2024 9:08 AM  "

## 2024-05-02 NOTE — PROGRESS NOTES
Urology FLORENTINO Vallejo said this referral was sent to [facility]. Daughter states that they did call and he is scheduled now in [facility].  Maribel Cowart RN...........5/2/2024 11:03 AM

## 2024-05-02 NOTE — H&P (VIEW-ONLY)
Oncology Chemotherapy Education Visit:  May 2, 2024  Diagnosis:Metastatic hepatocellular carcinoma    History Of Present Illness:  Patient presents today for chemotherapy education. Patient was seen on 4/30/24 to evaluate concerning new diagnosis of metastatic hepatocellular carcinoma with bone metastases. According to the patient he was very active and at enjoyed fairly active outdoor life experience.  He developed a 3-week progressive right upper quadrant abdominal pain.  It started after he lifted a heavy pan and he developed right lower quadrant and right shoulder pain. Patient reported that it slowly progressed to the right upper quadrant and became very severe almost stabbing. Patient presented to the emergency department on 4/8/24.  CT scan of the abdomen/pelvis revealed a large heterogeneous enhancing rounded mass in the central right lobe of the liver with margins and now well-defined lesion measuring roughly 7.3 x 7.7 x 8.0 cm there was also an enhancing poorly defined lesion in the left lobe of the liver measuring 2.8 x 3.6 cm in dimension. There was evidence of radiation therapy seeds that were present within the prostate.  The patient also been noted to have polycythemia which was noted in the past beginning 5 months ago and his hemoglobin was 18.0 hematocrit 53.5.  Repeat CBC in the emergency department revealed on hemoglobin of 19.4 and hematocrit 59.7.  Patient denied any history of alcohol abuse, he denies Tylenol abuse, there was no history of hepatitis B or C.  A CT-guided biopsy of the liver mass was performed on 4/18/24 and pathology revealed hepatocellular carcinoma. PET scan was performed on 4/24/24 and the findings were that there was a 15 cm hypermetabolic mass in the right lobe of the liver consistent with malignancy i.e. hepatocellular carcinoma. There was evidence of bony hypermetabolic metastatic disease involving the posterior elements of T1-T2 with an SUV of 33 there was also evidence  "of metastatic disease within the medullary space of the proximal right femur.  Patient had been taking oxycodone around-the-clock on a every 4 hours basis due to back pain. Dr Cedillo changed his pain medication to MS contin. Patient states this has helped with pain control. Patient was also started on a Zpak at his last visit for possible bronchitis. He has taken 3 doses. Patient denies any cough, no shortness of breath or fevers. He states he has no had any respiratory symptoms. Patient was seen by primary care yesterday for hematuria and was found to have a urinary tract infection. He was placed on Cipro. CT urogram done yesterday shows interval progression of portal vein thrombus extending into the SMV and splenic vein, new mild to moderate ascites. Patient reports improvement in urinary symptoms. Urology referral was made by primary care. Patient does report some ongoing fatigue and weakness. He will be having an EGD on Monday and treatment can not start until after EGD. Patient has no other new concerns.    Review Of Systems:  Review Of Systems  Respiratory: No shortness of breath, dyspnea on exertion, cough, or hemoptysis  Cardiovascular: denies chest pain  Gastrointestinal: reports abdominal bloating, denies bowel changes  Genitourinary: reports recent UTI symptoms  Musculoskeletal: reports improvement in back pain  Neurologic: reports weakness, denies headaches  Hematologic/Lymphatic/Immunologic: denies fevers      Nursing Notes:   Rosario Marie, [facility]  5/2/2024  9:35 AM  Signed  Oncology Rooming Note    May 2, 2024 9:08 AM   Heriberto Noriega is a 78 year old male who presents for:    Chief Complaint   Patient presents with    Chemo Education     Initial Vitals: /68 (BP Location: Right arm, Patient Position: Sitting, Cuff Size: Adult Regular)   Pulse 100   Temp 96.8  F (36  C) (Tympanic)   Ht 1.702 m (5' 7\")   Wt 80.7 kg (178 lb)   SpO2 93%   BMI 27.88 kg/m   Estimated body mass index is " "27.88 kg/m  as calculated from the following:    Height as of this encounter: 1.702 m (5' 7\").    Weight as of this encounter: 80.7 kg (178 lb). Body surface area is 1.95 meters squared.  No Pain (1) Comment: much better on new pain meds   No LMP for male patient.  Allergies reviewed: Yes  Medications reviewed: Yes    Medications: Medication refills not needed today.  Pharmacy name entered into [employer]: [facility] [address] [place] [id number]    Frailty Screening:   Is the patient here for a new oncology consult visit in cancer care? 2. No      Clinical concerns: urology referral placed yesterday by J for the CT Urogram result.      Rosario Marie CMA ([facility]) 5/2/2024 9:08 AM      Past medical, social, surgical, and family histories reviewed.    Allergies:  Allergies as of 05/02/2024 - Reviewed 05/02/2024   Allergen Reaction Noted    Aspirin Hives 06/04/2012       Current Medications:  Current Outpatient Medications   Medication Sig Dispense Refill    alfuzosin ER (UROXATRAL) 10 MG 24 hr tablet TAKE ONE TABLET BY MOUTH DAILY WITH FOOD 90 tablet 4    azithromycin (ZITHROMAX) 250 MG tablet Take 2 tablets (500 mg) by mouth daily for 1 day, THEN 1 tablet (250 mg) daily for 4 days. 6 tablet 0    co-enzyme Q-10 50 MG CAPS Take 100 mg by mouth daily       morphine (MS CONTIN) 30 MG CR tablet Take 1 tablet (30 mg) by mouth every 12 hours 60 tablet 0    rosuvastatin (CRESTOR) 20 MG tablet TAKE 1 TABLET BY MOUTH EVERY DAY 90 tablet 4    acetaminophen (TYLENOL) 500 MG tablet 500 mg (Patient not taking: Reported on 4/30/2024)      apixaban ANTICOAGULANT (ELIQUIS ANTICOAGULANT) 2.5 MG tablet Take 1 tablet (2.5 mg) by mouth 2 times daily 60 tablet 1    ciprofloxacin (CIPRO) 250 MG tablet Take 1 tablet (250 mg) by mouth 2 times daily 20 tablet 0    meclizine (ANTIVERT) 12.5 MG tablet Take 1 tablet (12.5 mg) by mouth 3 times daily as needed for dizziness (Patient not taking: Reported on 4/30/2024) 30 " "tablet 11    oxyCODONE (ROXICODONE) 5 MG tablet Take 1-3 tablets (5-15 mg) by mouth every 4 hours as needed for severe pain (Patient not taking: Reported on 5/1/2024) 120 tablet 0    oxyCODONE (ROXICODONE) 5 MG tablet Take 1-3 tablets (5-15 mg) by mouth every 4 hours as needed for pain (Patient not taking: Reported on 5/1/2024) 30 tablet 0    scopolamine (TRANSDERM) 1 MG/3DAYS 72 hr patch Place 1 patch onto the skin every 72 hours (Patient not taking: Reported on 4/30/2024) 10 patch 3        Physical Exam:  /68 (BP Location: Right arm, Patient Position: Sitting, Cuff Size: Adult Regular)   Pulse 100   Temp 96.8  F (36  C) (Tympanic)   Ht 1.702 m (5' 7\")   Wt 80.7 kg (178 lb)   SpO2 93%   BMI 27.88 kg/m      GENERAL APPEARANCE: 78 year old male, alert and no distress     NECK: no adenopathy, no asymmetry or masses     RESP: lungs clear to auscultation - no rales, rhonchi or wheezes     CARDIOVASCULAR: regular rates and rhythm, normal S1 S2     MUSCULOSKELETAL: extremities normal- no gross deformities noted     SKIN: no suspicious lesions or rashes on exposed skin     PSYCHIATRIC: mentation appears normal and affect normal    Laboratory/Imaging Studies  Office Visit on 05/01/2024   Component Date Value Ref Range Status    Color Urine 05/01/2024 Orange (A)  Colorless, Straw, Light Yellow, Yellow Final    Appearance Urine 05/01/2024 Cloudy (A)  Clear Final    Glucose Urine 05/01/2024 Negative  Negative mg/dL Final    Bilirubin Urine 05/01/2024 Negative  Negative Final    Ketones Urine 05/01/2024 Trace (A)  Negative mg/dL Final    Specific Gravity Urine 05/01/2024 1.023  1.000 - 1.030 Final    Blood Urine 05/01/2024 Large (A)  Negative Final    pH Urine 05/01/2024 5.5  5.0 - 9.0 Final    Protein Albumin Urine 05/01/2024 50 (A)  Negative mg/dL Final    Urobilinogen Urine 05/01/2024 2.0  Normal, 2.0 mg/dL Final    Nitrite Urine 05/01/2024 Negative  Negative Final    Leukocyte Esterase Urine 05/01/2024 Moderate " (A)  Negative Final    WBC Clumps Urine 05/01/2024 Present (A)  None Seen /HPF Final    Mucus Urine 05/01/2024 Present (A)  None Seen /LPF Final    RBC Urine 05/01/2024 >182 (H)  <=2 /HPF Final    WBC Urine 05/01/2024 177 (H)  <=5 /HPF Final    Hyaline Casts Urine 05/01/2024 13 (H)  <=2 /LPF Final   Oncology Visit on 04/30/2024   Component Date Value Ref Range Status    Erythrocyte Sedimentation Rate 04/30/2024 16  0 - 20 mm/hr Final    Lactate Dehydrogenase 04/30/2024 429 (H)  0 - 250 U/L Final    Sodium 04/30/2024 134 (L)  135 - 145 mmol/L Final    Reference intervals for this test were updated on 09/26/2023 to more accurately reflect our healthy population. There may be differences in the flagging of prior results with similar values performed with this method. Interpretation of those prior results can be made in the context of the updated reference intervals.     Potassium 04/30/2024 4.0  3.4 - 5.3 mmol/L Final    Carbon Dioxide (CO2) 04/30/2024 25  22 - 29 mmol/L Final    Anion Gap 04/30/2024 12  7 - 15 mmol/L Final    Urea Nitrogen 04/30/2024 17.3  8.0 - 23.0 mg/dL Final    Creatinine 04/30/2024 0.57 (L)  0.67 - 1.17 mg/dL Final    GFR Estimate 04/30/2024 >90  >60 mL/min/1.73m2 Final    Calcium 04/30/2024 9.8  8.8 - 10.2 mg/dL Final    Chloride 04/30/2024 97 (L)  98 - 107 mmol/L Final    Glucose 04/30/2024 151 (H)  70 - 99 mg/dL Final    Alkaline Phosphatase 04/30/2024 299 (H)  40 - 150 U/L Final    Reference intervals for this test were updated on 11/14/2023 to more accurately reflect our healthy population. There may be differences in the flagging of prior results with similar values performed with this method. Interpretation of those prior results can be made in the context of the updated reference intervals.    AST 04/30/2024 147 (H)  0 - 45 U/L Final    Reference intervals for this test were updated on 6/12/2023 to more accurately reflect our healthy population. There may be differences in the flagging of  prior results with similar values performed with this method. Interpretation of those prior results can be made in the context of the updated reference intervals.    ALT 04/30/2024 54  0 - 70 U/L Final    Reference intervals for this test were updated on 6/12/2023 to more accurately reflect our healthy population. There may be differences in the flagging of prior results with similar values performed with this method. Interpretation of those prior results can be made in the context of the updated reference intervals.      Protein Total 04/30/2024 6.4  6.4 - 8.3 g/dL Final    Albumin 04/30/2024 3.3 (L)  3.5 - 5.2 g/dL Final    Bilirubin Total 04/30/2024 2.9 (H)  <=1.2 mg/dL Final    WBC Count 04/30/2024 10.5  4.0 - 11.0 10e3/uL Final    RBC Count 04/30/2024 6.96 (H)  4.40 - 5.90 10e6/uL Final    Hemoglobin 04/30/2024 19.3 (H)  13.3 - 17.7 g/dL Final    Hematocrit 04/30/2024 58.4 (H)  40.0 - 53.0 % Final    MCV 04/30/2024 84  78 - 100 fL Final    MCH 04/30/2024 27.7  26.5 - 33.0 pg Final    MCHC 04/30/2024 33.0  31.5 - 36.5 g/dL Final    RDW 04/30/2024 19.6 (H)  10.0 - 15.0 % Final    Platelet Count 04/30/2024 217  150 - 450 10e3/uL Final    % Neutrophils 04/30/2024 81  % Final    % Lymphocytes 04/30/2024 8  % Final    % Monocytes 04/30/2024 10  % Final    % Eosinophils 04/30/2024 0  % Final    % Basophils 04/30/2024 1  % Final    % Immature Granulocytes 04/30/2024 1  % Final    NRBCs per 100 WBC 04/30/2024 0  <1 /100 Final    Absolute Neutrophils 04/30/2024 8.5 (H)  1.6 - 8.3 10e3/uL Final    Absolute Lymphocytes 04/30/2024 0.8  0.8 - 5.3 10e3/uL Final    Absolute Monocytes 04/30/2024 1.0  0.0 - 1.3 10e3/uL Final    Absolute Eosinophils 04/30/2024 0.0  0.0 - 0.7 10e3/uL Final    Absolute Basophils 04/30/2024 0.1  0.0 - 0.2 10e3/uL Final    Absolute Immature Granulocytes 04/30/2024 0.1  <=0.4 10e3/uL Final    Absolute NRBCs 04/30/2024 0.0  10e3/uL Final        ASSESSMENT/PLAN:  1.Newly diagnosed hepatocellular  carcinoma with bony metastases. Child-Kamara class A as per NCCN guidelines as well as multiple studies show patient would be a candidate for atezolizumab plus bevacizumab. Given the fact patient may have underlying signs of portal hypertension, it was recommended the patient obtain an EGD to rule out esophageal varices.  If he has esophageal varices he would not be a candidate for bevacizumab due to the risk of bleeding if it does not we will proceed with therapy.  Plan is to treat with bevacizumab and atezolizumab, atezolizumab to be given at a dose 1200 mg IV on day 1 and bevacizumab was given at a dose of 15 mg/kg on day 1 of q. 21-days. The plan is to restage with a PET scan after 4 cycles of this regimen. Chemotherapy education was performed. We discussed how chemotherapy works, possible side effects of treatment, the importance of proper nutrition. Tour of infusion center given. Will wait for results of EGD and can then proceed immediately with treatment. Patient to see Dr Cedillo with cycle 2.    2. Portal vein thrombus. CT urogram done yesterday shows interval progression of portal vein thrombus extending into the SMV and splenic vein, new mild to moderate ascites. Dr Cedillo would like patient to start Eliquis 2.5mg bid. This was discussed with patient. Prescription sent to Dr Cedillo for approval  and signature.      3. Pain. Patient has had reports of severe pain, mainly in the lower back. He was taking oxycodone 5 mg p.o. every 6 hours as needed. He was taking this every 4 hours.  Dr Cedillo changed his pain medication to MS contin. Patient states this has helped with pain control. Will continue to monitor.    Fifty two minutes spent with this encounter with time spent reviewing patient records, counseling patient regarding disease process, performing chemotherapy education, touring infusion center, discussing results of recent imaging with patient and with oncologist, obtaining a review of systems,  performing exam, documenting in EHR and coordination of care

## 2024-05-02 NOTE — PROGRESS NOTES
Oncology Chemotherapy Education Visit:  May 2, 2024  Diagnosis:Metastatic hepatocellular carcinoma    History Of Present Illness:  Patient presents today for chemotherapy education. Patient was seen on 4/30/24 to evaluate concerning new diagnosis of metastatic hepatocellular carcinoma with bone metastases. According to the patient he was very active and at enjoyed fairly active outdoor life experience.  He developed a 3-week progressive right upper quadrant abdominal pain.  It started after he lifted a heavy pan and he developed right lower quadrant and right shoulder pain. Patient reported that it slowly progressed to the right upper quadrant and became very severe almost stabbing. Patient presented to the emergency department on 4/8/24.  CT scan of the abdomen/pelvis revealed a large heterogeneous enhancing rounded mass in the central right lobe of the liver with margins and now well-defined lesion measuring roughly 7.3 x 7.7 x 8.0 cm there was also an enhancing poorly defined lesion in the left lobe of the liver measuring 2.8 x 3.6 cm in dimension. There was evidence of radiation therapy seeds that were present within the prostate.  The patient also been noted to have polycythemia which was noted in the past beginning 5 months ago and his hemoglobin was 18.0 hematocrit 53.5.  Repeat CBC in the emergency department revealed on hemoglobin of 19.4 and hematocrit 59.7.  Patient denied any history of alcohol abuse, he denies Tylenol abuse, there was no history of hepatitis B or C.  A CT-guided biopsy of the liver mass was performed on 4/18/24 and pathology revealed hepatocellular carcinoma. PET scan was performed on 4/24/24 and the findings were that there was a 15 cm hypermetabolic mass in the right lobe of the liver consistent with malignancy i.e. hepatocellular carcinoma. There was evidence of bony hypermetabolic metastatic disease involving the posterior elements of T1-T2 with an SUV of 33 there was also evidence  "of metastatic disease within the medullary space of the proximal right femur.  Patient had been taking oxycodone around-the-clock on a every 4 hours basis due to back pain. Dr Cedillo changed his pain medication to MS contin. Patient states this has helped with pain control. Patient was also started on a Zpak at his last visit for possible bronchitis. He has taken 3 doses. Patient denies any cough, no shortness of breath or fevers. He states he has no had any respiratory symptoms. Patient was seen by primary care yesterday for hematuria and was found to have a urinary tract infection. He was placed on Cipro. CT urogram done yesterday shows interval progression of portal vein thrombus extending into the SMV and splenic vein, new mild to moderate ascites. Patient reports improvement in urinary symptoms. Urology referral was made by primary care. Patient does report some ongoing fatigue and weakness. He will be having an EGD on Monday and treatment can not start until after EGD. Patient has no other new concerns.    Review Of Systems:  Review Of Systems  Respiratory: No shortness of breath, dyspnea on exertion, cough, or hemoptysis  Cardiovascular: denies chest pain  Gastrointestinal: reports abdominal bloating, denies bowel changes  Genitourinary: reports recent UTI symptoms  Musculoskeletal: reports improvement in back pain  Neurologic: reports weakness, denies headaches  Hematologic/Lymphatic/Immunologic: denies fevers      Nursing Notes:   Rosario Marie, [facility]  5/2/2024  9:35 AM  Signed  Oncology Rooming Note    May 2, 2024 9:08 AM   Heriberto Noriega is a 78 year old male who presents for:    Chief Complaint   Patient presents with    Chemo Education     Initial Vitals: /68 (BP Location: Right arm, Patient Position: Sitting, Cuff Size: Adult Regular)   Pulse 100   Temp 96.8  F (36  C) (Tympanic)   Ht 1.702 m (5' 7\")   Wt 80.7 kg (178 lb)   SpO2 93%   BMI 27.88 kg/m   Estimated body mass index is " "27.88 kg/m  as calculated from the following:    Height as of this encounter: 1.702 m (5' 7\").    Weight as of this encounter: 80.7 kg (178 lb). Body surface area is 1.95 meters squared.  No Pain (1) Comment: much better on new pain meds   No LMP for male patient.  Allergies reviewed: Yes  Medications reviewed: Yes    Medications: Medication refills not needed today.  Pharmacy name entered into [employer]: [facility] [id number] [place] [id number]    Frailty Screening:   Is the patient here for a new oncology consult visit in cancer care? 2. No      Clinical concerns: urology referral placed yesterday by J for the CT Urogram result.      Rosario Marie CMA ([facility]) 5/2/2024 9:08 AM      Past medical, social, surgical, and family histories reviewed.    Allergies:  Allergies as of 05/02/2024 - Reviewed 05/02/2024   Allergen Reaction Noted    Aspirin Hives 06/04/2012       Current Medications:  Current Outpatient Medications   Medication Sig Dispense Refill    alfuzosin ER (UROXATRAL) 10 MG 24 hr tablet TAKE ONE TABLET BY MOUTH DAILY WITH FOOD 90 tablet 4    azithromycin (ZITHROMAX) 250 MG tablet Take 2 tablets (500 mg) by mouth daily for 1 day, THEN 1 tablet (250 mg) daily for 4 days. 6 tablet 0    co-enzyme Q-10 50 MG CAPS Take 100 mg by mouth daily       morphine (MS CONTIN) 30 MG CR tablet Take 1 tablet (30 mg) by mouth every 12 hours 60 tablet 0    rosuvastatin (CRESTOR) 20 MG tablet TAKE 1 TABLET BY MOUTH EVERY DAY 90 tablet 4    acetaminophen (TYLENOL) 500 MG tablet 500 mg (Patient not taking: Reported on 4/30/2024)      apixaban ANTICOAGULANT (ELIQUIS ANTICOAGULANT) 2.5 MG tablet Take 1 tablet (2.5 mg) by mouth 2 times daily 60 tablet 1    ciprofloxacin (CIPRO) 250 MG tablet Take 1 tablet (250 mg) by mouth 2 times daily 20 tablet 0    meclizine (ANTIVERT) 12.5 MG tablet Take 1 tablet (12.5 mg) by mouth 3 times daily as needed for dizziness (Patient not taking: Reported on 4/30/2024) 30 " "tablet 11    oxyCODONE (ROXICODONE) 5 MG tablet Take 1-3 tablets (5-15 mg) by mouth every 4 hours as needed for severe pain (Patient not taking: Reported on 5/1/2024) 120 tablet 0    oxyCODONE (ROXICODONE) 5 MG tablet Take 1-3 tablets (5-15 mg) by mouth every 4 hours as needed for pain (Patient not taking: Reported on 5/1/2024) 30 tablet 0    scopolamine (TRANSDERM) 1 MG/3DAYS 72 hr patch Place 1 patch onto the skin every 72 hours (Patient not taking: Reported on 4/30/2024) 10 patch 3        Physical Exam:  /68 (BP Location: Right arm, Patient Position: Sitting, Cuff Size: Adult Regular)   Pulse 100   Temp 96.8  F (36  C) (Tympanic)   Ht 1.702 m (5' 7\")   Wt 80.7 kg (178 lb)   SpO2 93%   BMI 27.88 kg/m      GENERAL APPEARANCE: 78 year old male, alert and no distress     NECK: no adenopathy, no asymmetry or masses     RESP: lungs clear to auscultation - no rales, rhonchi or wheezes     CARDIOVASCULAR: regular rates and rhythm, normal S1 S2     MUSCULOSKELETAL: extremities normal- no gross deformities noted     SKIN: no suspicious lesions or rashes on exposed skin     PSYCHIATRIC: mentation appears normal and affect normal    Laboratory/Imaging Studies  Office Visit on 05/01/2024   Component Date Value Ref Range Status    Color Urine 05/01/2024 Orange (A)  Colorless, Straw, Light Yellow, Yellow Final    Appearance Urine 05/01/2024 Cloudy (A)  Clear Final    Glucose Urine 05/01/2024 Negative  Negative mg/dL Final    Bilirubin Urine 05/01/2024 Negative  Negative Final    Ketones Urine 05/01/2024 Trace (A)  Negative mg/dL Final    Specific Gravity Urine 05/01/2024 1.023  1.000 - 1.030 Final    Blood Urine 05/01/2024 Large (A)  Negative Final    pH Urine 05/01/2024 5.5  5.0 - 9.0 Final    Protein Albumin Urine 05/01/2024 50 (A)  Negative mg/dL Final    Urobilinogen Urine 05/01/2024 2.0  Normal, 2.0 mg/dL Final    Nitrite Urine 05/01/2024 Negative  Negative Final    Leukocyte Esterase Urine 05/01/2024 Moderate " (A)  Negative Final    WBC Clumps Urine 05/01/2024 Present (A)  None Seen /HPF Final    Mucus Urine 05/01/2024 Present (A)  None Seen /LPF Final    RBC Urine 05/01/2024 >182 (H)  <=2 /HPF Final    WBC Urine 05/01/2024 177 (H)  <=5 /HPF Final    Hyaline Casts Urine 05/01/2024 13 (H)  <=2 /LPF Final   Oncology Visit on 04/30/2024   Component Date Value Ref Range Status    Erythrocyte Sedimentation Rate 04/30/2024 16  0 - 20 mm/hr Final    Lactate Dehydrogenase 04/30/2024 429 (H)  0 - 250 U/L Final    Sodium 04/30/2024 134 (L)  135 - 145 mmol/L Final    Reference intervals for this test were updated on 09/26/2023 to more accurately reflect our healthy population. There may be differences in the flagging of prior results with similar values performed with this method. Interpretation of those prior results can be made in the context of the updated reference intervals.     Potassium 04/30/2024 4.0  3.4 - 5.3 mmol/L Final    Carbon Dioxide (CO2) 04/30/2024 25  22 - 29 mmol/L Final    Anion Gap 04/30/2024 12  7 - 15 mmol/L Final    Urea Nitrogen 04/30/2024 17.3  8.0 - 23.0 mg/dL Final    Creatinine 04/30/2024 0.57 (L)  0.67 - 1.17 mg/dL Final    GFR Estimate 04/30/2024 >90  >60 mL/min/1.73m2 Final    Calcium 04/30/2024 9.8  8.8 - 10.2 mg/dL Final    Chloride 04/30/2024 97 (L)  98 - 107 mmol/L Final    Glucose 04/30/2024 151 (H)  70 - 99 mg/dL Final    Alkaline Phosphatase 04/30/2024 299 (H)  40 - 150 U/L Final    Reference intervals for this test were updated on 11/14/2023 to more accurately reflect our healthy population. There may be differences in the flagging of prior results with similar values performed with this method. Interpretation of those prior results can be made in the context of the updated reference intervals.    AST 04/30/2024 147 (H)  0 - 45 U/L Final    Reference intervals for this test were updated on 6/12/2023 to more accurately reflect our healthy population. There may be differences in the flagging of  prior results with similar values performed with this method. Interpretation of those prior results can be made in the context of the updated reference intervals.    ALT 04/30/2024 54  0 - 70 U/L Final    Reference intervals for this test were updated on 6/12/2023 to more accurately reflect our healthy population. There may be differences in the flagging of prior results with similar values performed with this method. Interpretation of those prior results can be made in the context of the updated reference intervals.      Protein Total 04/30/2024 6.4  6.4 - 8.3 g/dL Final    Albumin 04/30/2024 3.3 (L)  3.5 - 5.2 g/dL Final    Bilirubin Total 04/30/2024 2.9 (H)  <=1.2 mg/dL Final    WBC Count 04/30/2024 10.5  4.0 - 11.0 10e3/uL Final    RBC Count 04/30/2024 6.96 (H)  4.40 - 5.90 10e6/uL Final    Hemoglobin 04/30/2024 19.3 (H)  13.3 - 17.7 g/dL Final    Hematocrit 04/30/2024 58.4 (H)  40.0 - 53.0 % Final    MCV 04/30/2024 84  78 - 100 fL Final    MCH 04/30/2024 27.7  26.5 - 33.0 pg Final    MCHC 04/30/2024 33.0  31.5 - 36.5 g/dL Final    RDW 04/30/2024 19.6 (H)  10.0 - 15.0 % Final    Platelet Count 04/30/2024 217  150 - 450 10e3/uL Final    % Neutrophils 04/30/2024 81  % Final    % Lymphocytes 04/30/2024 8  % Final    % Monocytes 04/30/2024 10  % Final    % Eosinophils 04/30/2024 0  % Final    % Basophils 04/30/2024 1  % Final    % Immature Granulocytes 04/30/2024 1  % Final    NRBCs per 100 WBC 04/30/2024 0  <1 /100 Final    Absolute Neutrophils 04/30/2024 8.5 (H)  1.6 - 8.3 10e3/uL Final    Absolute Lymphocytes 04/30/2024 0.8  0.8 - 5.3 10e3/uL Final    Absolute Monocytes 04/30/2024 1.0  0.0 - 1.3 10e3/uL Final    Absolute Eosinophils 04/30/2024 0.0  0.0 - 0.7 10e3/uL Final    Absolute Basophils 04/30/2024 0.1  0.0 - 0.2 10e3/uL Final    Absolute Immature Granulocytes 04/30/2024 0.1  <=0.4 10e3/uL Final    Absolute NRBCs 04/30/2024 0.0  10e3/uL Final        ASSESSMENT/PLAN:  1.Newly diagnosed hepatocellular  carcinoma with bony metastases. Child-Kamara class A as per NCCN guidelines as well as multiple studies show patient would be a candidate for atezolizumab plus bevacizumab. Given the fact patient may have underlying signs of portal hypertension, it was recommended the patient obtain an EGD to rule out esophageal varices.  If he has esophageal varices he would not be a candidate for bevacizumab due to the risk of bleeding if it does not we will proceed with therapy.  Plan is to treat with bevacizumab and atezolizumab, atezolizumab to be given at a dose 1200 mg IV on day 1 and bevacizumab was given at a dose of 15 mg/kg on day 1 of q. 21-days. The plan is to restage with a PET scan after 4 cycles of this regimen. Chemotherapy education was performed. We discussed how chemotherapy works, possible side effects of treatment, the importance of proper nutrition. Tour of infusion center given. Will wait for results of EGD and can then proceed immediately with treatment. Patient to see Dr Cedillo with cycle 2.    2. Portal vein thrombus. CT urogram done yesterday shows interval progression of portal vein thrombus extending into the SMV and splenic vein, new mild to moderate ascites. Dr Cedillo would like patient to start Eliquis 2.5mg bid. This was discussed with patient. Prescription sent to Dr Cedillo for approval  and signature.      3. Pain. Patient has had reports of severe pain, mainly in the lower back. He was taking oxycodone 5 mg p.o. every 6 hours as needed. He was taking this every 4 hours.  Dr Cedillo changed his pain medication to MS contin. Patient states this has helped with pain control. Will continue to monitor.    Fifty two minutes spent with this encounter with time spent reviewing patient records, counseling patient regarding disease process, performing chemotherapy education, touring infusion center, discussing results of recent imaging with patient and with oncologist, obtaining a review of systems,  performing exam, documenting in EHR and coordination of care

## 2024-05-06 NOTE — ANESTHESIA PREPROCEDURE EVALUATION
Anesthesia Pre-Procedure Evaluation    Patient: Heriberto Noriega   MRN: [id number] : 1945        Procedure : Procedure(s):  Esophagoscopy, gastroscopy, duodenoscopy (EGD), combined          Past Medical History:   Diagnosis Date    Cholelithiasis     Coronary artery calcification     Dietary counseling and surveillance     Low carbohydrate diet.    Dorsalgia     2005,with radicular symptoms    Enlarged prostate without lower urinary tract symptoms (luts)     Benign with mild urinary obstruction symptoms.    Hyperlipidemia     Malignant neoplasm of prostate (H)     2011,He completed radiation treatment for prostate cancer on .  Initially had Radioactive palladium seed implants done followed by external beam radiation    Secondary polycythemia     3/29/2017      Past Surgical History:   Procedure Laterality Date    COLONOSCOPY       Hyperplastic polyp at 20 cm.    COLONOSCOPY      05,next colonoscopy due in     COLONOSCOPY  2015    Follow up 10 years 2025, hyperplastic    PROSTATE SURGERY      Seeds and radiation      Allergies   Allergen Reactions    Aspirin Hives      Social History     Tobacco Use    Smoking status: Former     Current packs/day: 0.00     Types: Cigarettes     Quit date: 1979     Years since quittin.3     Passive exposure: Never    Smokeless tobacco: Never   Substance Use Topics    Alcohol use: Yes     Comment: very rare      Wt Readings from Last 1 Encounters:   24 80.7 kg (178 lb)        Anesthesia Evaluation   Pt has had prior anesthetic.     No history of anesthetic complications       ROS/MED HX  ENT/Pulmonary:     (+) sleep apnea, uses CPAP,                                      Neurologic:  - neg neurologic ROS     Cardiovascular:     (+)  hypertension- -  CAD -  - -   Taking blood thinners Pt has received instructions: Instructions Given to patient: Stopped elequis three days ago.                                "  METS/Exercise Tolerance: 1 - Eating, dressing    Hematologic:     (+) History of blood clots,    pt is anticoagulated,           Musculoskeletal:   (+)  arthritis,             GI/Hepatic:     (+)             liver disease,       Renal/Genitourinary:  - neg Renal ROS     Endo:     (+)               Obesity,       Psychiatric/Substance Use:  - neg psychiatric ROS     Infectious Disease:  - neg infectious disease ROS     Malignancy:   (+) Malignancy, History of Other.Other CA Active status post.    Other:  - neg other ROS          Physical Exam    Airway        Mallampati: II   TM distance: > 3 FB   Neck ROM: full   Mouth opening: > 3 cm    Respiratory Devices and Support         Dental       (+) Minor Abnormalities - some fillings, tiny chips      Cardiovascular   cardiovascular exam normal       Rhythm and rate: regular and normal     Pulmonary   pulmonary exam normal        breath sounds clear to auscultation           OUTSIDE LABS:  CBC:   Lab Results   Component Value Date    WBC 10.5 04/30/2024    WBC 8.6 04/18/2024    HGB 19.3 (H) 04/30/2024    HGB 19.0 (H) 04/18/2024    HCT 58.4 (H) 04/30/2024    HCT 56.6 (H) 04/18/2024     04/30/2024     04/18/2024     BMP:   Lab Results   Component Value Date     (L) 04/30/2024     04/08/2024    POTASSIUM 4.0 04/30/2024    POTASSIUM 3.9 04/08/2024    CHLORIDE 97 (L) 04/30/2024    CHLORIDE 99 04/08/2024    CO2 25 04/30/2024    CO2 27 04/08/2024    BUN 17.3 04/30/2024    BUN 12.7 04/08/2024    CR 0.57 (L) 04/30/2024    CR 0.71 04/08/2024     (H) 04/30/2024     (H) 04/08/2024     COAGS:   Lab Results   Component Value Date    PTT 36 05/24/2018    INR 1.13 04/18/2024     POC: No results found for: \"BGM\", \"HCG\", \"HCGS\"  HEPATIC:   Lab Results   Component Value Date    ALBUMIN 3.3 (L) 04/30/2024    PROTTOTAL 6.4 04/30/2024    ALT 54 04/30/2024     (H) 04/30/2024    ALKPHOS 299 (H) 04/30/2024    BILITOTAL 2.9 (H) 04/30/2024     OTHER: " "  Lab Results   Component Value Date    A1C 5.2 11/15/2023    JOSE 9.8 04/30/2024    LIPASE 40 04/08/2024    SED 16 04/30/2024       Anesthesia Plan    ASA Status:  3    NPO Status:  NPO Appropriate    Anesthesia Type: MAC.   Induction: Propofol.   Maintenance: Balanced.        Consents    Anesthesia Plan(s) and associated risks, benefits, and realistic alternatives discussed. Questions answered and patient/representative(s) expressed understanding.     - Discussed: Risks, Benefits and Alternatives for BOTH SEDATION and the PROCEDURE were discussed     - Discussed with:  Patient      - Extended Intubation/Ventilatory Support Discussed: No.      - Patient is DNR/DNI Status: No     Use of blood products discussed: No .     Postoperative Care            Comments:               ZACK GALINDO CRNA    I have reviewed the pertinent notes and labs in the chart from the past 30 days and (re)examined the patient.  Any updates or changes from those notes are reflected in this note.     # Hyponatremia: Lowest Na = 134 mmol/L in last 30 days, will monitor as appropriate      # Hypoalbuminemia: Lowest albumin = 3.3 g/dL in the past 30 days , will monitor as appropriate   # Drug Induced Coagulation Defect: home medication list includes an anticoagulant medication   # Overweight: Estimated body mass index is 27.88 kg/m  as calculated from the following:    Height as of 5/2/24: 1.702 m (5' 7\").    Weight as of 5/2/24: 80.7 kg (178 lb).      "

## 2024-05-06 NOTE — OP NOTE
PROCEDURE NOTE    SURGEON:Robinson Cid MD    PRE-OP DIAGNOSIS:   Abdominal pain, hepatocellular carcinoma      POST-OP DIAGNOSIS: Abdominal pain, hepatocellular carcinoma, bile reflux, possible Candida esophagitis    PROCEDURE PLANNED:   Diagnostic EGD, flexible        PROCEDURE PERFORMED:  EGD with biopsy, flexible    SPECIMEN:     ID Type Source Tests Collected by Time Destination   1 : DUODENUM BIOPSIES Biopsy Small Intestine, Duodenum SURGICAL PATHOLOGY EXAM Robinson Cid MD 5/6/2024  8:49 AM    2 : ANTRUM BIOPSIES Biopsy Stomach, Antrum SURGICAL PATHOLOGY EXAM Robinson Cid MD 5/6/2024  8:50 AM    3 : GE JUNCTION BIOPSIES Biopsy Gastric Esophageal Junction SURGICAL PATHOLOGY EXAM Robinson Cid MD 5/6/2024  8:51 AM    4 : MID ESOPHAGUS BIOPSIES Biopsy Esophagus, Mid SURGICAL PATHOLOGY EXAM Robinson Cid MD 5/6/2024  8:51 AM            ANESTHESIA: MAC  Coverage requested due to: Age over 70  CRNA Independent: May Cid APRN CRNA      ESTIMATED BLOOD LOSS: None    COMPLICATIONS:  None    INDICATION FOR THE PROCEDURE:The patient is a 78 year oldmale. The patient presents with hepatocellular carcinoma about to start immunotherapy.  They wanted to evaluate his abdominal pain prior to starting therapy. I explained to the patient the risks, benefits and alternatives to diagnostic EGD for evaluating abdominal pain. We specifically discussed the risks of bleeding, infection, perforation and the risks of sedation. The patient's questions were answered and the patient wished to proceed. Informed consent paperwork was completed.    PROCEDURE: The patient was taken to the endoscopy suite. Appropriate monitors were attached. The patient was placed in the left lateral decubitus position. Bite block was positioned. Timeout was performed confirming the patient's identity and procedure to be performed. After appropriate sedation was confirmed, the flexible endoscope was advanced into the oropharynx. The  posterior oropharynx appeared grossly normal. The scope was advanced into the proximal esophagus. The esophagus was insufflated with air. The scope was advanced under direct visualization. No acute abnormalities of the esophagus were noted. The scope was advanced into the stomach. The scope was advanced through the pylorus into the duodenal bulb. The bulb and distal duodenum appeared grossly normal.  The scope was withdrawn back into the stomach. Antral biopsy was obtained and sent to pathology.  There was bile reflux throughout the stomach.  The scope was retroflexed and the GE junction inspected.  The scope was returned to a neutral position and the stomach was decompressed. The scope was withdrawn to the GE junction and biopsy obtained.  There was moderate bleeding.  The GE junction was friable.  The mucosa of the esophagus was inspected while withdrawing the scope.  Some plaque he patches possible candidiasis was noted on the way into the stomach.  The mid esophagus was biopsied on the way out. The scope was withdrawn from the patient. The bite block was removed. The patient tolerated the procedure with no immediately apparent complication. The patient was taken to recovery instable condition.    FOLLOW UP:  RECOMMENDATIONS follow-up pathology.  No reason to delay immune therapy    Robinson Cid MD on 5/6/2024 at 8:58 AM

## 2024-05-06 NOTE — INTERVAL H&P NOTE
"I have reviewed the surgical (or preoperative) H&P that is linked to this encounter, and examined the patient. There are no significant changes    Clinical Conditions Present on Arrival:  Clinically Significant Risk Factors Present on Admission         # Hyponatremia: Lowest Na = 134 mmol/L in last 30 days, will monitor as appropriate      # Hypoalbuminemia: Lowest albumin = 3.3 g/dL in the past 30 days , will monitor as appropriate   # Drug Induced Coagulation Defect: home medication list includes an anticoagulant medication   # Overweight: Estimated body mass index is 27.88 kg/m  as calculated from the following:    Height as of 5/2/24: 1.702 m (5' 7\").    Weight as of 5/2/24: 80.7 kg (178 lb).       "

## 2024-05-06 NOTE — ANESTHESIA POSTPROCEDURE EVALUATION
Patient: Heriberto Noriega    Procedure: Procedure(s):  Esophagoscopy, gastroscopy, duodenoscopy (EGD), combined WITH BIOPSIES       Anesthesia Type:  MAC    Note:  Disposition: Outpatient   Postop Pain Control: Uneventful            Sign Out: Well controlled pain   PONV: No   Neuro/Psych: Uneventful            Sign Out: Acceptable/Baseline neuro status   Airway/Respiratory: Uneventful            Sign Out: Acceptable/Baseline resp. status   CV/Hemodynamics: Uneventful            Sign Out: Acceptable CV status; No obvious hypovolemia; No obvious fluid overload   Other NRE: NONE   DID A NON-ROUTINE EVENT OCCUR? No           Last vitals:  Vitals Value Taken Time   /81 05/06/24 0945   Temp 97.5  F (36.4  C) 05/06/24 0859   Pulse 94 05/06/24 0945   Resp 14 05/06/24 0859   SpO2 90 % 05/06/24 0952   Vitals shown include unfiled device data.    Electronically Signed By: ZACK GALINDO CRNA  May 6, 2024  11:20 AM

## 2024-05-06 NOTE — PROGRESS NOTES
Spoke with Dr. Cid and there were no varices found during EGD. Ok to proceed with chemotherapy tomorrow as scheduled.   Maribel Cowart RN...........5/6/2024 1:51 PM

## 2024-05-06 NOTE — DISCHARGE INSTRUCTIONS
[facility]  Adult Discharge Orders & Instructions    ________________________________________________________________          For 12 hours after surgery  Get plenty of rest.  A responsible adult must stay with you for at least 12 hours after you leave the hospital.   You may feel lightheaded.  IF so, sit for a few minutes before standing.  Have someone help you get up.   You may have a slight fever. Call the doctor if your fever is over 101 F (38.3 C) (taken under the tongue) or lasts longer than 24 hours.  You may have a dry mouth, a sore throat, muscle aches or trouble sleeping.  These should go away after 24 hours.  Do not make important or legal decisions.  6.   Do not drive or use heavy equipment.  If you have weakness or tingling, don't drive or use heavy equipment until this feeling goes away.    To contact a doctor, call   [phone]_______________________

## 2024-05-06 NOTE — ANESTHESIA CARE TRANSFER NOTE
Patient: Heriberto Noriega    Procedure: Procedure(s):  Esophagoscopy, gastroscopy, duodenoscopy (EGD), combined WITH BIOPSIES       Diagnosis: Liver mass [R16.0]  Abdominal pain [R10.9]  Diagnosis Additional Information: No value filed.    Anesthesia Type:   MAC     Note:    Oropharynx: bite block in place  Level of Consciousness: awake  Oxygen Supplementation: face mask  Level of Supplemental Oxygen (L/min / FiO2): 6  Independent Airway: airway patency satisfactory and stable  Dentition: dentition unchanged  Vital Signs Stable: post-procedure vital signs reviewed and stable  Report to RN Given: handoff report given  Patient transferred to: Phase II    Handoff Report: Identifed the Patient, Identified the Reponsible Provider, Reviewed the pertinent medical history, Discussed the surgical course, Reviewed Intra-OP anesthesia mangement and issues during anesthesia, Set expectations for post-procedure period and Allowed opportunity for questions and acknowledgement of understanding      Vitals:  Vitals Value Taken Time   BP     Temp     Pulse     Resp     SpO2         Electronically Signed By: ZACK Robertson CRNA  May 6, 2024  9:03 AM

## 2024-05-06 NOTE — OR NURSING
Patient has been discharged to home at 1015 via wheelchair accompanied by his Significant other    Written discharge instructions were provided to patient.  Prescriptions were e-scribed to [facility].  Patient states their pain is 0/10, and denies any nausea or dizziness upon discharge.    Patient and adult caring for them verbalize understanding of discharge instructions including no driving until tomorrow and no longer taking narcotic pain medications - no operating mechanical equipment and no making any important decisions.They understand reason for discharge, and necessary follow-up appointments.       Brenda Pickett RN

## 2024-05-06 NOTE — INTERVAL H&P NOTE
"I have reviewed the surgical (or preoperative) H&P that is linked to this encounter, and examined the patient. There are no significant changes    Clinical Conditions Present on Arrival:  Clinically Significant Risk Factors Present on Admission         # Hyponatremia: Lowest Na = 134 mmol/L in last 30 days, will monitor as appropriate      # Hypoalbuminemia: Lowest albumin = 3.3 g/dL in the past 30 days , will monitor as appropriate   # Drug Induced Coagulation Defect: home medication list includes an anticoagulant medication   # Overweight: Estimated body mass index is 27.88 kg/m  as calculated from the following:    Height as of this encounter: 1.702 m (5' 7\").    Weight as of this encounter: 80.7 kg (178 lb).       "

## 2024-05-07 NOTE — PHARMACY-MEDICATION TEACHING
Pharmacy: NEW INFUSION MEDICATION EDUCATION    Heriberto Noriega  [address] [place] [place] [address]  [phone] (home) [phone] (work)  78 year old male  PCP:Jim Parnell    Allergies   Allergen Reactions    Aspirin Hives         Summary of Education:   Met with patient today to review new medications to be administered in infusion including atezolizumab and bevacizumab. Discussed cycle length, and home use of as needed medications including n/a. Discussed interaction between n/a.  We discussed patient's current pain regimen.  Patient states the morphine ER has helped him sleep through the night, although patient is having some breakthrough pain.  Patient was educated to use his oxycodone PRN.  Patient was also started on sucralfate after an EGD yesterday and he is wondering how to space out this medication from the rest of his usual medications.  We discussed a plan for this.  We also discussed a bowel regimen if his usual prune juice is no longer effective.  Patient is also complaining of phlegm in his throat and we discussed trialing some Mucinex with adequate hydration to see if that helps him.  Patient states he feels his cough is improving after the z-genie.  Patient also denies any urinary symptoms at this time.  Patient states he has three more days of his most recent antibiotic (presumably Bactrim DS)    Materials Provided:   Drug information handouts printed from [employer] on: atezolizumab, bevacizumab    Thank you for the consult.     Nena Arellano RPH ....................  5/7/2024   1:23 PM

## 2024-05-07 NOTE — NURSING NOTE
Infusion Nursing Note:  Heriberto ANJANA Severinoer presents today for Tecentriq/Zirabev cycle 1 day 1.    Patient seen by provider today: No   present during visit today: Not Applicable.    Note: N/A.      Intravenous Access:  Labs drawn without difficulty.  Peripheral IV placed.    Treatment Conditions:  UA RESULTS:  Recent Labs   Lab Test 05/07/24  1141   COLOR  --    APPEARANCE  --    URINEGLC  --    URINEBILI  --    URINEKETONE  --    SG  --    UBLD  --    URINEPH  --    PROTEIN 20*   NITRITE  --    LEUKEST  --    RBCU  --    WBCU  --      BP within treatment parameters.      Post Infusion Assessment:  Patient tolerated infusion without incident.  Blood return noted pre and post infusion.  Site patent and intact, free from redness, edema or discomfort.  No evidence of extravasations.  Access discontinued per protocol.       Discharge Plan:   Discharge instructions reviewed with: Patient and wife.  Patient and/or family verbalized understanding of discharge instructions and all questions answered.  Copy of AVS reviewed with patient and/or family.  Patient will return 5/28/24 for next appointment.  Patient discharged in stable condition accompanied by: wife.  Departure Mode: Ambulatory.      Nikki De Dios RN

## 2024-05-08 NOTE — PROGRESS NOTES
[facility]: Cancer Care Follow-Up Note                                    Discussion with Patient:                                                      Heriberto states that he is doing well after chemotherapy. He states that the Morphine is working well and he is not having any pain. No nausea or vomiting. He has questions regarding medications and his discussion with pharmacist day of chemotherapy. He is confused about medications, Sucralfate in particular.          Dates of Treatment:                                                      Infusion given in last 28 days       Administered MAR Action Medication Dose Rate Visit    05/07/2024 12:24 New Bag atezolizumab (TECENTRIQ) 1,200 mg in sodium chloride 0.9 % 130 mL infusion 1,200 mg 130 mL/hr Infusion Visit on 05/07/2024 in [facility]    05/07/2024 13:41 New Bag bevacizumab-bvzr (ZIRABEV) 1,200 mg in sodium chloride 0.9 % 158 mL infusion 1,200 mg 316 mL/hr Infusion Visit on 05/07/2024 in [facility]            Assessment:                                                          RNCC Short Symptom Review:     Assessment completed with:: Patient    General/Short Assessment  Does the patient have all their medications?: Yes  Is the patient experiencing any new or worsening symptoms?: No  Discussion with patient: Answered patient's questions and addressed concerns;Reviewed how and when to contact clinic;Reviewed patient's future appointments    Pain  Patient Reported Pain?: No  Pain Score: No Pain (0)    Patient Coping  Accepting    Clinic Utilization  Patient Aware of Next Appointment?: Yes    Other Patient Concerns  Other Patient Reported Concerns: Yes, see notes    Intervention/Education provided during outreach:                                                       Spoke with pharmacist Nena and she will call Heriberto. Nena also sent message to Dr. Parnell for medication management.    Follow up call in 1-2  weeks  Patient to follow up as scheduled at next appt  Patient to call/MyChart message with updates  Confirmed patient has clinic and triage numbers    Signature:  Maribel Cowart RN

## 2024-05-15 NOTE — TELEPHONE ENCOUNTER
"Patient's daughter is very concerned. She lives in the [facility] and cannot be here at all times. She will be back up next weekend to help them at home and check on things.    Her parents are both going through a lot of medical issues. Her dad's pain management regimen is working for pain but it's also making him so sleepy and non-functional. Maranda is concerned because he is the one who \"takes care of bills, shopping, and everything\". She wonders if her mom is fully capable of being a full time nurse for him at home.     Would it be good to see him and discuss changing this or is it something he will get used to?    His next appointment with [facility] is on 5/28/24.    Rosario Marie CMA(AAMA)..................5/15/2024   8:48 AM   "

## 2024-05-15 NOTE — ED TRIAGE NOTES
Pt arrives via private vehicle from across the street for concerns of blood clot in right leg. Right lower leg is significantly swollen. Pt is on blood thinners. Pt was sent over from clinic.      Triage Assessment (Adult)       Row Name 05/15/24 1434          Triage Assessment    Airway WDL WDL        Respiratory WDL    Respiratory WDL WDL        Skin Circulation/Temperature WDL    Skin Circulation/Temperature WDL X        Cardiac WDL    Cardiac WDL WDL        Peripheral/Neurovascular WDL    Peripheral Neurovascular WDL WDL        Cognitive/Neuro/Behavioral WDL    Cognitive/Neuro/Behavioral WDL WDL

## 2024-05-15 NOTE — ED PROVIDER NOTES
"  History     Chief Complaint   Patient presents with    Leg Swelling     The history is provided by the patient and medical records.     Heriberto Noriega is a 78 year old male who presents to the emergency department today waning of bilateral lower leg swelling over the past couple of days, right worse than the left.  He is concerned that he has a blood clot in his right lower leg. He also reports he has been feeling short of breath since April. He cannot tell me if it is worse or not. He is nauseated, denies vomiting. Denies chest pain.   Patient reports that he has been fatigued, has been sleeping a lot in his chair throughout the day. He hasn't been able to read his books, enjoy going outdoors like he used to. He tells me that he takes pills, sleeps, wakes up and takes more pills. He is unsteady on his feet with walking lately. He reports the MS james helps with his pain, that hasn't been an issue. He is nauseated and has a decreased appetite lately.      Patient is currently being treated for metastatic cancer with bone mets.  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 CT scan from 5/1/24 showed:   Abnormal enhancement of the majority of the liver persists. Portal  vein thrombosis is significantly worse, extending into the SMV and  splenic vein. He was started on Eliquis 2.5 mg BID by Dr. Roe. He is receiving treatment for his cancer with atezolizumab + bevacixumab, first treatment 5/7/24. He tells me the next one is 5/28/24. Patient tells me that he is doing treatment to \"get back to life\" and start feeling better.     Patient was seen at the [facility] today for Acute diastolic congestive HF- noted to have weight increased from 170lb last week to 183lbs while in the clinic. He was complaining of feeling unwell to his provider during his echo today- experiencing heart palpitations, right lower leg swelling,shortness of breath and feeling " lightheaded. The sent him to the ER for evaluation.Patient tells me that he is not having palpitations now.  5/13/24 started on lasix 20 mg daily, metoprolol 25 mg xl daily.     PMH ascending aortic aneurysm, elevated calcium score    Allergies:  Allergies   Allergen Reactions    Aspirin Hives       Problem List:    Patient Active Problem List    Diagnosis Date Noted    Hepatocellular carcinoma metastatic to bone (H) 04/30/2024     Priority: Medium    Dilatation of thoracic aorta, 4 cm (6/20) 07/01/2020     Priority: Medium    Coronary artery calcification seen on CT scan, Score 1504 06/07/2018     Priority: Medium    Chest pain 05/24/2018     Priority: Medium    Obesity 02/02/2018     Priority: Medium    Polycythemia 03/29/2017     Priority: Medium    Counseling regarding advanced directives and goals of care 07/10/2015     Priority: Medium     Overview:   Wants only comfort cares if not cognitively intact. Does not want to go to skilled nursing facility.      ACP (advance care planning) 12/10/2013     Priority: Medium    Malignant neoplasm of prostate (H) 11/21/2011     Priority: Medium     Overview:   Completed radiation therapy in August of 2012 at [facility]      Hyperlipidemia 10/04/2011     Priority: Medium    Hearing loss, sensorineural 11/10/2009     Priority: Medium    Osteoarthrosis 11/10/2009     Priority: Medium    Seborrheic keratosis 11/10/2009     Priority: Medium    Tinnitus 11/10/2009     Priority: Medium        Past Medical History:    Past Medical History:   Diagnosis Date    Cholelithiasis     Coronary artery calcification     Dietary counseling and surveillance     Dorsalgia     Enlarged prostate without lower urinary tract symptoms (luts)     Hyperlipidemia     Malignant neoplasm of prostate (H)     Secondary polycythemia        Past Surgical History:    Past Surgical History:   Procedure Laterality Date    COLONOSCOPY       Hyperplastic polyp at 20 cm.    COLONOSCOPY      5/16/05,next  colonoscopy due in     COLONOSCOPY  2015    Follow up 10 years 2025, hyperplastic    ESOPHAGOSCOPY, GASTROSCOPY, DUODENOSCOPY (EGD), COMBINED N/A 2024    Gastritis    PROSTATE SURGERY      Seeds and radiation       Family History:    Family History   Problem Relation Age of Onset    Heart Disease Father          from CHF    Breast Cancer Mother          from Breast Cancer    Other - See Comments Brother         Agent orange exposure    Breast Cancer Sister     Heart Disease Brother         Heart Disease,cardiac surgery, CABG    Other - See Comments Brother          polio    Cancer Brother         Cancer, from lung cancer       Social History:  Marital Status:   [2]  Social History     Tobacco Use    Smoking status: Former     Current packs/day: 0.00     Types: Cigarettes     Quit date: 1979     Years since quittin.4     Passive exposure: Never    Smokeless tobacco: Never   Vaping Use    Vaping status: Never Used   Substance Use Topics    Alcohol use: Yes     Comment: very rare    Drug use: No        Medications:    furosemide (LASIX) 20 MG tablet  metoprolol succinate ER (TOPROL XL) 25 MG 24 hr tablet  spironolactone (ALDACTONE) 25 MG tablet  alfuzosin ER (UROXATRAL) 10 MG 24 hr tablet  apixaban ANTICOAGULANT (ELIQUIS ANTICOAGULANT) 2.5 MG tablet  ciprofloxacin (CIPRO) 250 MG tablet  co-enzyme Q-10 50 MG CAPS  morphine (MS CONTIN) 30 MG CR tablet  omeprazole (PRILOSEC) 20 MG DR capsule  rosuvastatin (CRESTOR) 20 MG tablet  sucralfate (CARAFATE) 1 GM tablet        Review of Systems   Constitutional:  Positive for activity change, appetite change and fatigue.   Respiratory:  Positive for shortness of breath.    Cardiovascular:  Positive for leg swelling. Negative for chest pain.   Gastrointestinal:  Positive for nausea. Negative for vomiting.   Genitourinary:  Negative for difficulty urinating and urgency.   See HPI    Physical Exam   BP: 104/73  Pulse: 94  Temp: 97  F  "(36.1  C)  Resp: 16  Height: 170.2 cm (5' 7\")  Weight: 81.6 kg (180 lb)  SpO2: 93 %      Physical Exam  Vitals and nursing note reviewed.   Constitutional:       General: He is not in acute distress.     Appearance: He is ill-appearing.   HENT:      Head: Atraumatic.      Nose: Nose normal.      Mouth/Throat:      Mouth: Mucous membranes are dry.   Eyes:      Pupils: Pupils are equal, round, and reactive to light.   Cardiovascular:      Rate and Rhythm: Normal rate and regular rhythm.      Heart sounds: Murmur heard.   Pulmonary:      Effort: No respiratory distress.      Comments: Fine crackles bases bilaterally  Abdominal:      General: There is distension.      Tenderness: There is abdominal tenderness.      Comments: Rounded   Musculoskeletal:      Cervical back: Normal range of motion.      Right lower leg: Edema present.      Left lower leg: Edema present.   Skin:     General: Skin is warm and dry.      Capillary Refill: Capillary refill takes less than 2 seconds.   Neurological:      General: No focal deficit present.      Mental Status: He is alert and oriented to person, place, and time.      GCS: GCS eye subscore is 4. GCS verbal subscore is 5. GCS motor subscore is 6.   Psychiatric:         Mood and Affect: Mood is depressed. Affect is flat.         Behavior: Behavior is cooperative.         ED Course            EKG Interpretation:      Interpreted by ZACK Blevins CNP  Time reviewed: 1855  Symptoms at time of EKG: shortness of breath   Rhythm: normal sinus   Rate: 82  ST Segments/ T Waves: flattened T waves Lead III, no ST elevation  EKG from 2018 similar  Clinical Impression: normal sinus, possible anterolateral infarct- age undetermined  Pending  EKG over read by internal medicine          Results for orders placed or performed during the hospital encounter of 05/15/24 (from the past 24 hour(s))   US Lower Extremity Venous Duplex Right    Narrative    US LOWER EXTREMITY VENOUS DUPLEX " RIGHT  5/15/2024 4:57 PM    History:Male, age 78 years; ; leg swelling, DVT ?    Comparison: No relevant prior imaging.    Technique: Grayscale and color Doppler ultrasound of the left lower  extremity deep venous structures.    Findings:   The deep venous structures are patent and fully compressible. There is  normal augmentation of flow.      Impression    Impression:  No evidence of DVT.     Diffuse soft tissue edema throughout the lower extremity.    NAYELI HAMILTON MD         SYSTEM ID:  [id number]   CBC with platelets differential    Narrative    The following orders were created for panel order CBC with platelets differential.  Procedure                               Abnormality         Status                     ---------                               -----------         ------                     CBC with platelets and d...[[id number]]  Abnormal            Final result                 Please view results for these tests on the individual orders.   Comprehensive metabolic panel   Result Value Ref Range    Sodium 133 (L) 135 - 145 mmol/L    Potassium 3.9 3.4 - 5.3 mmol/L    Carbon Dioxide (CO2) 25 22 - 29 mmol/L    Anion Gap 16 (H) 7 - 15 mmol/L    Urea Nitrogen 13.1 8.0 - 23.0 mg/dL    Creatinine 0.92 0.67 - 1.17 mg/dL    GFR Estimate 85 >60 mL/min/1.73m2    Calcium 9.5 8.8 - 10.2 mg/dL    Chloride 92 (L) 98 - 107 mmol/L    Glucose 78 70 - 99 mg/dL    Alkaline Phosphatase 274 (H) 40 - 150 U/L     (H) 0 - 45 U/L    ALT 71 (H) 0 - 70 U/L    Protein Total 5.9 (L) 6.4 - 8.3 g/dL    Albumin 2.9 (L) 3.5 - 5.2 g/dL    Bilirubin Total 1.8 (H) <=1.2 mg/dL   Magnesium   Result Value Ref Range    Magnesium 2.2 1.7 - 2.3 mg/dL   Nt probnp inpatient (BNP)   Result Value Ref Range    N terminal Pro BNP Inpatient 212 0 - 1,800 pg/mL   Troponin T, High Sensitivity   Result Value Ref Range    Troponin T, High Sensitivity 16 <=22 ng/L   CBC with platelets and differential   Result Value Ref Range    WBC Count 10.2 4.0  - 11.0 10e3/uL    RBC Count 6.63 (H) 4.40 - 5.90 10e6/uL    Hemoglobin 18.4 (H) 13.3 - 17.7 g/dL    Hematocrit 55.1 (H) 40.0 - 53.0 %    MCV 83 78 - 100 fL    MCH 27.8 26.5 - 33.0 pg    MCHC 33.4 31.5 - 36.5 g/dL    RDW 20.3 (H) 10.0 - 15.0 %    Platelet Count 229 150 - 450 10e3/uL    % Neutrophils 82 %    % Lymphocytes 9 %    % Monocytes 8 %    % Eosinophils 1 %    % Basophils 1 %    % Immature Granulocytes 0 %    NRBCs per 100 WBC 0 <1 /100    Absolute Neutrophils 8.3 1.6 - 8.3 10e3/uL    Absolute Lymphocytes 0.9 0.8 - 5.3 10e3/uL    Absolute Monocytes 0.8 0.0 - 1.3 10e3/uL    Absolute Eosinophils 0.1 0.0 - 0.7 10e3/uL    Absolute Basophils 0.1 0.0 - 0.2 10e3/uL    Absolute Immature Granulocytes 0.0 <=0.4 10e3/uL    Absolute NRBCs 0.0 10e3/uL   XR Chest Port 1 View    Narrative    Procedure:XR CHEST PORT 1 VIEW    Clinical history:Male, 78 years, shortness of breath    Technique: Single view was obtained.    Comparison: 5/24/2018    Findings: The cardiac silhouette is within normal limits.. The  pulmonary vasculature is within normal limits.    The lungs are clear. Bony structures are unremarkable.      Impression    Impression:   No acute abnormality. No evidence of acute or active cardiopulmonary  disease.    NAYELI HAMILTON MD         SYSTEM ID:  [id number]   UA with Microscopic reflex to Culture    Specimen: Urine, Midstream   Result Value Ref Range    Color Urine Yellow Colorless, Straw, Light Yellow, Yellow    Appearance Urine Slightly Cloudy (A) Clear    Glucose Urine Negative Negative mg/dL    Bilirubin Urine Small (A) Negative    Ketones Urine 20 (A) Negative mg/dL    Specific Gravity Urine 1.016 1.000 - 1.030    Blood Urine Trace (A) Negative    pH Urine 6.0 5.0 - 9.0    Protein Albumin Urine 70 (A) Negative mg/dL    Urobilinogen Urine 2.0 Normal, 2.0 mg/dL    Nitrite Urine Negative Negative    Leukocyte Esterase Urine Negative Negative    Bacteria Urine Few (A) None Seen /HPF    Mucus Urine Present (A)  None Seen /LPF    RBC Urine 0 <=2 /HPF    WBC Urine 21 (H) <=5 /HPF    Squamous Epithelials Urine 1 <=1 /HPF    Hyaline Casts Urine 2 <=2 /LPF    RBC Casts Urine 115 (H) None Seen /LPF    Narrative    Urine Culture ordered based on laboratory criteria   Troponin T, High Sensitivity   Result Value Ref Range    Troponin T, High Sensitivity 15 <=22 ng/L       Medications - No data to display    Assessments & Plan (with Medical Decision Making)  Temp: 97  F (36.1  C)   BP: 109/81 Pulse: 100   Resp: 16 SpO2: 93 % O2 Device: Nasal cannula Oxygen Delivery: 2 LPM   Patient appears ill, tired. His voice is raspy. Breathing is nonlabored at rest, he was placed on 2 L nasal cannula by nursing staff (he is not on home 02)  for room air oxygen saturations intermittently in the high 80's. Lung sounds crackles in the bases bilaterally. Rounded abdomen, ascites.  LE are edematous, skin is tight, redness to right anterior shin, no warmth, may be more consistent with bruising than a cellulitis. He is alert, orientated.   Labs & Radiology results interpreted by radiologist:   ED Course as of 05/16/24 0032   Wed May 15, 2024   1832 UA with Microscopic reflex to Culture(!)  Culture in procress, + bacteria, ketones, trace blood,  WBC- recent treatment for UTI, specimen is    1834 CBC with platelets differential(!)  Stable from previous   1834 Troponin T, High Sensitivity  negative   1834 Comprehensive metabolic panel(!)  Elevated liver enzymes, bilirubin- consistent with previous levels; albumin 2.9, protein 5.9  Na+ 133 K 3.9   1835 Magnesium  2.2   1835 XR Chest Port 1 View   No acute abnormality. No evidence of acute or active cardiopulmonary  disease     1836 US Lower Extremity Venous Duplex Right  No evidence of DVT.      Diffuse soft tissue edema throughout the lower extremit     1919 Nt probnp inpatient (BNP)  212   2025 Troponin T, High Sensitivity  15      EKG: no ST elevation depression  Meds: MS contin home dose, as patient  takes this at 9am/9pm  Recent UTI treated with bactrim 5/3/24- urine culture + for staphylococcus- pending culture today, patient denies symptoms  8:55 PM   I consulted with Dr. Jimenes today and he agreed to come speak to the patient with me this evening to discuss patient's condition and treatment goals. Patient tells us that he is fatigued which is why he came in today, and he doesn't have a good quality of life in his current state of health. Dr. Jimenes and I had a through discussion with patient that his symptoms are consistent with his metastatic disease and its complications as well as poor intake of food, contributing to his worsening edema.  He may very well be experiencing fatigue because of his condition and treatment, as well as from his pain medications at home.  We discussed with patient he should follow-up and speak with his oncologist as well as his primary care provider about his prognosis, life and treatment goals. We also had a discussion with patient about the option of comfort for his symptoms, including a referral for hospice he so chooses to do that in the future.  I also discussed with the patient the option of cutting his pain medications in half so he is not so sleepy during the day being if that helps with his pain and his fatigue.  Patient was very receptive to information given tonight would like to go home where he is more comfortable. He thanked us for speaking with him about his options tonight.  He states that he will talk to his wife about his goals and treatment in the future.   Patient to discharge home, RX for spironolactone today, hospice referral placed with patient's approval. He would like [facility] to [facility].      I have reviewed the nursing notes.    I have reviewed the findings, diagnosis, plan and need for follow up with the patient.    Medical Decision Making  The patient's presentation was of moderate complexity (a chronic illness mild to moderate exacerbation,  progression, or side effect of treatment).    The patient's evaluation involved:  review of external note(s) from 3+ sources (see separate area of note for details)  review of 3+ test result(s) ordered prior to this encounter (see separate area of note for details)  ordering and/or review of 3+ test(s) in this encounter (see separate area of note for details)  discussion of management or test interpretation with another health professional (see separate area of note for details)    The patient's management necessitated moderate risk (prescription drug management including medications given in the ED) and high risk (a parenteral controlled substance).        Discharge Medication List as of 5/15/2024  9:01 PM        START taking these medications    Details   spironolactone (ALDACTONE) 25 MG tablet Take 1 tablet (25 mg) by mouth daily, Disp-30 tablet, R-0, E-Prescribe             Final diagnoses:   Hepatocellular carcinoma metastatic to bone (H)   Malignant ascites (H28)   Bilateral leg edema       5/15/2024   [facility] AND [facility] NorthwestHar[facility], APRN CNP  05/17/24 0016

## 2024-05-15 NOTE — TELEPHONE ENCOUNTER
Are they asking to be seen earlier? The sedation from pain meds will improve with time. Let them know. If he is homebound we can get homecare involved too, or offer care coordination too.

## 2024-05-16 NOTE — TELEPHONE ENCOUNTER
At this point, I don't believe they would qualify for Home Care, but I know his daughter would appreciate some help with Care Coordination. If that it possible, please contact Maranda. She was able to get Meals on Wheels (frozen meals due to distance) lined up, but [facility] has a waiting list for help.    She isn't sure if his medications should be changed right now as it is helping with pain, but just had concerns about his condition.     Rosario Marie CMA([facility])..................5/16/2024   10:43 AM

## 2024-05-16 NOTE — DISCHARGE INSTRUCTIONS
Set up informational meeting with hospice, you can get enrolled whenever you feel ready to do so.   You can cut your morphine in half (15 mg) and take this twice a day as needed  Your urine culture is pending- if you need to be treated for this, you will receive a call.   Return to be seen if symptoms worsen or change such, increased pain, fever, shortness of breath, nausea, vomiting.   Follow up with primary care as needed.

## 2024-05-16 NOTE — TELEPHONE ENCOUNTER
Maranda called and said that her dad was in ER last night and was told by the ER doctor that his chemo is not working and that he should be on Hospice. She did the referral.     I had previously sent a message to Dr Parnell about the concerns and he was willing to place a  referral to help them out, but since that time the hospice referral was also made.     Maranda is coming up tomorrow and may just need some direction with everything.    Rosario Marie CMA([facility])..................5/16/2024   3:47 PM

## 2024-05-17 NOTE — TELEPHONE ENCOUNTER
Spoke to Maranda and let her know about the Social Work referral that was placed by Dr Parnell.     Also they want to wait on the Hospice referral and see how things go. Dr Cedillo will see him on 5/28/24 with next treatment and they can discuss how he is doing then.    She says that Heriberto was up a little more today and made himself breakfast. Also the ER provider told them to cut the pain pills in half and that seems to be working better.     Rosario Marie CMA([facility])..................5/17/2024   1:56 PM

## 2024-05-20 NOTE — PROGRESS NOTES
Clinic Care Coordination Contact    Patient referred to Care Coordination from provider Dr. Parnell for Complex medical concerns/education.  After chart review, noted that patient has Oncologist, Dr. Cedillo, managing his metastatic cancer.  Recent ER visit on 5-15-24 addressing portal vein thrombosis and other co-morbidities.     Initiated phone contact with patient today, dtr Maranda answered phone.  Maranda/dtr reported that patient is signing on to Hospice today and declined Care Coordination at this time.     Plan: Declined Care Coordination due to signing on to Hospice today.      ANASTACIA BLANC on 5/20/2024 at 2:28 PM

## 2024-05-20 NOTE — TELEPHONE ENCOUNTER
Called patient's daughter Maranda back.  She wanted to update us on her dad's status.  He has not been doing well.  His treatments were stopped recently due to concerns about his heart, in which she is told by his cardiologist Dr. Prado that his heart is good.  He gets really confused and is out of it most of the time when taking his pain meds.  He does request the pain meds but is unable to state where the pain is.  Hospice is coming today to talk with them.  They question if continuing the treatment would help him feel better or make him feel worse.  Heriberto had stated that he doesn't want more treatments if they make him worse feeling.  They will discuss this at upcoming visit next week.  Brigitte Denson LPN .......5/20/2024 11:59 AM

## 2024-05-20 NOTE — TELEPHONE ENCOUNTER
Patient's daughter called back stating that hospice is wanting them to sign that they will take care of him if he has no further treatments.  Heriberto is saying that he doesn't want any further treatments.  The family would like a call to talk about this further tomorrow.  Brigitte Denson LPN .......5/20/2024 4:17 PM

## 2024-05-21 NOTE — TELEPHONE ENCOUNTER
Spoke with daughter and Heriberto is not wanting to pursue further. Since first treatment he was unable to function during the day, unable to eat much, very confused. Hospice decreased pain medications and he is more alert now. Daughter will discuss and confirm whether he wants to stop treatment since he is much less confused. Daughter will reach out with decision in 1-2 days.  Maribel Cowart RN...........5/21/2024 11:30 AM

## 2024-05-22 NOTE — NURSING NOTE
If he continues treatment lab orders will be released by infusion.  Maribel Cowart RN...........5/22/2024 1:51 PM

## 2024-05-23 NOTE — PROGRESS NOTES
Daughter Maranda called and Heriberto is going to be on hospice. He does not wish any further treatment for his cancer.   will cancel all oncology visits and infusions.  Maribel Cowart RN...........5/23/2024 3:32 PM

## 2024-06-03 ENCOUNTER — MEDICAL CORRESPONDENCE (OUTPATIENT)
Dept: HEALTH INFORMATION MANAGEMENT | Facility: OTHER | Age: 79
End: 2024-06-03
Payer: COMMERCIAL

## 2024-06-17 DIAGNOSIS — I77.810 DILATATION OF THORACIC AORTA (H): Primary | ICD-10-CM

## 2024-06-17 RX ORDER — SPIRONOLACTONE 25 MG/1
25 TABLET ORAL DAILY
Qty: 90 TABLET | Refills: 4 | OUTPATIENT
Start: 2024-06-17

## (undated) DEVICE — SYR 50ML LL W/O NDL 309653

## (undated) DEVICE — ENDO BITE BLOCK 60 MAXI LF 00712804

## (undated) DEVICE — ENDO FORCEP ENDOJAW BIOPSY 2.8MMX230CM FB-220U

## (undated) DEVICE — Device

## (undated) DEVICE — ENDO KIT COMPLIANCE DYKENDOCMPLY

## (undated) DEVICE — SOL WATER 1500ML

## (undated) DEVICE — SUCTION MANIFOLD NEPTUNE 2 SYS 4 PORT 0702-020-000

## (undated) DEVICE — TUBING SUCTION 10'X3/16" N510

## (undated) RX ORDER — HYDROMORPHONE HYDROCHLORIDE 1 MG/ML
INJECTION, SOLUTION INTRAMUSCULAR; INTRAVENOUS; SUBCUTANEOUS
Status: DISPENSED
Start: 2024-01-01

## (undated) RX ORDER — LIDOCAINE HYDROCHLORIDE 10 MG/ML
INJECTION, SOLUTION INFILTRATION; PERINEURAL
Status: DISPENSED
Start: 2024-01-01

## (undated) RX ORDER — NITROGLYCERIN 0.4 MG/1
TABLET SUBLINGUAL
Status: DISPENSED
Start: 2018-05-24

## (undated) RX ORDER — OXYCODONE HYDROCHLORIDE 5 MG/1
TABLET ORAL
Status: DISPENSED
Start: 2024-01-01